# Patient Record
Sex: FEMALE | Race: WHITE | NOT HISPANIC OR LATINO | Employment: UNEMPLOYED | URBAN - METROPOLITAN AREA
[De-identification: names, ages, dates, MRNs, and addresses within clinical notes are randomized per-mention and may not be internally consistent; named-entity substitution may affect disease eponyms.]

---

## 2017-04-21 ENCOUNTER — APPOINTMENT (OUTPATIENT)
Dept: GENERAL RADIOLOGY | Facility: HOSPITAL | Age: 57
End: 2017-04-21

## 2017-04-21 ENCOUNTER — LAB (OUTPATIENT)
Dept: LAB | Facility: HOSPITAL | Age: 57
End: 2017-04-21

## 2017-04-21 ENCOUNTER — OFFICE VISIT (OUTPATIENT)
Dept: ONCOLOGY | Facility: CLINIC | Age: 57
End: 2017-04-21

## 2017-04-21 VITALS
HEIGHT: 64 IN | BODY MASS INDEX: 21.72 KG/M2 | RESPIRATION RATE: 20 BRPM | TEMPERATURE: 98.1 F | WEIGHT: 127.2 LBS | OXYGEN SATURATION: 100 % | SYSTOLIC BLOOD PRESSURE: 102 MMHG | HEART RATE: 66 BPM | DIASTOLIC BLOOD PRESSURE: 64 MMHG

## 2017-04-21 DIAGNOSIS — C34.11 MALIGNANT NEOPLASM OF UPPER LOBE OF RIGHT LUNG (HCC): Primary | ICD-10-CM

## 2017-04-21 DIAGNOSIS — C34.11 MALIGNANT NEOPLASM OF UPPER LOBE OF RIGHT LUNG (HCC): ICD-10-CM

## 2017-04-21 LAB
BASOPHILS # BLD AUTO: 0.05 10*3/MM3 (ref 0–0.1)
BASOPHILS NFR BLD AUTO: 0.6 % (ref 0–1.1)
DEPRECATED RDW RBC AUTO: 38.4 FL (ref 37–49)
EOSINOPHIL # BLD AUTO: 0.42 10*3/MM3 (ref 0–0.36)
EOSINOPHIL NFR BLD AUTO: 4.8 % (ref 1–5)
ERYTHROCYTE [DISTWIDTH] IN BLOOD BY AUTOMATED COUNT: 12.8 % (ref 11.7–14.5)
HCT VFR BLD AUTO: 38.6 % (ref 34–45)
HGB BLD-MCNC: 13.3 G/DL (ref 11.5–14.9)
IMM GRANULOCYTES # BLD: 0.06 10*3/MM3 (ref 0–0.03)
IMM GRANULOCYTES NFR BLD: 0.7 % (ref 0–0.5)
LYMPHOCYTES # BLD AUTO: 2.64 10*3/MM3 (ref 1–3.5)
LYMPHOCYTES NFR BLD AUTO: 30 % (ref 20–49)
MCH RBC QN AUTO: 28.2 PG (ref 27–33)
MCHC RBC AUTO-ENTMCNC: 34.5 G/DL (ref 32–35)
MCV RBC AUTO: 81.8 FL (ref 83–97)
MONOCYTES # BLD AUTO: 0.56 10*3/MM3 (ref 0.25–0.8)
MONOCYTES NFR BLD AUTO: 6.4 % (ref 4–12)
NEUTROPHILS # BLD AUTO: 5.07 10*3/MM3 (ref 1.5–7)
NEUTROPHILS NFR BLD AUTO: 57.5 % (ref 39–75)
NRBC BLD MANUAL-RTO: 0 /100 WBC (ref 0–0)
PLATELET # BLD AUTO: 283 10*3/MM3 (ref 150–375)
PMV BLD AUTO: 8.8 FL (ref 8.9–12.1)
RBC # BLD AUTO: 4.72 10*6/MM3 (ref 3.9–5)
WBC NRBC COR # BLD: 8.8 10*3/MM3 (ref 4–10)

## 2017-04-21 PROCEDURE — 36415 COLL VENOUS BLD VENIPUNCTURE: CPT

## 2017-04-21 PROCEDURE — 99213 OFFICE O/P EST LOW 20 MIN: CPT | Performed by: INTERNAL MEDICINE

## 2017-04-21 PROCEDURE — 85025 COMPLETE CBC W/AUTO DIFF WBC: CPT

## 2017-04-21 PROCEDURE — 71020 HC CHEST PA AND LATERAL: CPT

## 2017-04-21 RX ORDER — NICOTINE 21 MG/24HR
1 PATCH, TRANSDERMAL 24 HOURS TRANSDERMAL EVERY 24 HOURS
Qty: 30 PATCH | Refills: 2 | Status: SHIPPED | OUTPATIENT
Start: 2017-04-21 | End: 2021-03-05

## 2017-04-27 ENCOUNTER — HOSPITAL ENCOUNTER (EMERGENCY)
Facility: HOSPITAL | Age: 57
Discharge: HOME | End: 2017-04-27
Attending: EMERGENCY MEDICINE | Admitting: EMERGENCY MEDICINE

## 2017-04-27 ENCOUNTER — APPOINTMENT (OUTPATIENT)
Dept: CT IMAGING | Facility: HOSPITAL | Age: 57
End: 2017-04-27

## 2017-04-27 VITALS
RESPIRATION RATE: 16 BRPM | BODY MASS INDEX: 23.56 KG/M2 | DIASTOLIC BLOOD PRESSURE: 73 MMHG | WEIGHT: 120 LBS | SYSTOLIC BLOOD PRESSURE: 116 MMHG | TEMPERATURE: 98.3 F | HEIGHT: 60 IN | HEART RATE: 79 BPM | OXYGEN SATURATION: 100 %

## 2017-04-27 DIAGNOSIS — R42 VERTIGO: Primary | ICD-10-CM

## 2017-04-27 LAB
ALBUMIN SERPL-MCNC: 4.2 G/DL (ref 3.5–5.2)
ALBUMIN/GLOB SERPL: 1 G/DL
ALP SERPL-CCNC: 85 U/L (ref 39–117)
ALT SERPL W P-5'-P-CCNC: 14 U/L (ref 1–33)
ANION GAP SERPL CALCULATED.3IONS-SCNC: 12.7 MMOL/L
AST SERPL-CCNC: 22 U/L (ref 1–32)
BASOPHILS # BLD AUTO: 0.02 10*3/MM3 (ref 0–0.2)
BASOPHILS NFR BLD AUTO: 0.2 % (ref 0–1.5)
BILIRUB SERPL-MCNC: 0.4 MG/DL (ref 0.1–1.2)
BUN BLD-MCNC: 15 MG/DL (ref 6–20)
BUN/CREAT SERPL: 17.2 (ref 7–25)
CALCIUM SPEC-SCNC: 9.9 MG/DL (ref 8.6–10.5)
CHLORIDE SERPL-SCNC: 104 MMOL/L (ref 98–107)
CO2 SERPL-SCNC: 25.3 MMOL/L (ref 22–29)
CREAT BLD-MCNC: 0.87 MG/DL (ref 0.57–1)
DEPRECATED RDW RBC AUTO: 40.2 FL (ref 37–54)
EOSINOPHIL # BLD AUTO: 0.04 10*3/MM3 (ref 0–0.7)
EOSINOPHIL NFR BLD AUTO: 0.4 % (ref 0.3–6.2)
ERYTHROCYTE [DISTWIDTH] IN BLOOD BY AUTOMATED COUNT: 13.2 % (ref 11.7–13)
GFR SERPL CREATININE-BSD FRML MDRD: 82 ML/MIN/1.73
GLOBULIN UR ELPH-MCNC: 4.1 GM/DL
GLUCOSE BLD-MCNC: 119 MG/DL (ref 65–99)
HCT VFR BLD AUTO: 39.4 % (ref 35.6–45.5)
HGB BLD-MCNC: 13.5 G/DL (ref 11.9–15.5)
HOLD SPECIMEN: NORMAL
HOLD SPECIMEN: NORMAL
IMM GRANULOCYTES # BLD: 0 10*3/MM3 (ref 0–0.03)
IMM GRANULOCYTES NFR BLD: 0 % (ref 0–0.5)
LYMPHOCYTES # BLD AUTO: 1.67 10*3/MM3 (ref 0.9–4.8)
LYMPHOCYTES NFR BLD AUTO: 16.7 % (ref 19.6–45.3)
MAGNESIUM SERPL-MCNC: 2 MG/DL (ref 1.6–2.6)
MCH RBC QN AUTO: 28.5 PG (ref 26.9–32)
MCHC RBC AUTO-ENTMCNC: 34.3 G/DL (ref 32.4–36.3)
MCV RBC AUTO: 83.1 FL (ref 80.5–98.2)
MONOCYTES # BLD AUTO: 0.25 10*3/MM3 (ref 0.2–1.2)
MONOCYTES NFR BLD AUTO: 2.5 % (ref 5–12)
NEUTROPHILS # BLD AUTO: 8.05 10*3/MM3 (ref 1.9–8.1)
NEUTROPHILS NFR BLD AUTO: 80.2 % (ref 42.7–76)
PLATELET # BLD AUTO: 230 10*3/MM3 (ref 140–500)
PMV BLD AUTO: 9.1 FL (ref 6–12)
POTASSIUM BLD-SCNC: 3.8 MMOL/L (ref 3.5–5.2)
PROT SERPL-MCNC: 8.3 G/DL (ref 6–8.5)
RBC # BLD AUTO: 4.74 10*6/MM3 (ref 3.9–5.2)
SODIUM BLD-SCNC: 142 MMOL/L (ref 136–145)
TROPONIN T SERPL-MCNC: <0.01 NG/ML (ref 0–0.03)
WBC NRBC COR # BLD: 10.03 10*3/MM3 (ref 4.5–10.7)
WHOLE BLOOD HOLD SPECIMEN: NORMAL
WHOLE BLOOD HOLD SPECIMEN: NORMAL

## 2017-04-27 PROCEDURE — 83735 ASSAY OF MAGNESIUM: CPT | Performed by: EMERGENCY MEDICINE

## 2017-04-27 PROCEDURE — 93010 ELECTROCARDIOGRAM REPORT: CPT | Performed by: INTERNAL MEDICINE

## 2017-04-27 PROCEDURE — 93005 ELECTROCARDIOGRAM TRACING: CPT | Performed by: EMERGENCY MEDICINE

## 2017-04-27 PROCEDURE — 99284 EMERGENCY DEPT VISIT MOD MDM: CPT

## 2017-04-27 PROCEDURE — 80053 COMPREHEN METABOLIC PANEL: CPT | Performed by: EMERGENCY MEDICINE

## 2017-04-27 PROCEDURE — 85025 COMPLETE CBC W/AUTO DIFF WBC: CPT | Performed by: EMERGENCY MEDICINE

## 2017-04-27 PROCEDURE — 70450 CT HEAD/BRAIN W/O DYE: CPT

## 2017-04-27 PROCEDURE — 84484 ASSAY OF TROPONIN QUANT: CPT | Performed by: EMERGENCY MEDICINE

## 2017-04-27 RX ORDER — SODIUM CHLORIDE 0.9 % (FLUSH) 0.9 %
10 SYRINGE (ML) INJECTION AS NEEDED
Status: DISCONTINUED | OUTPATIENT
Start: 2017-04-27 | End: 2017-04-27 | Stop reason: HOSPADM

## 2017-05-01 ENCOUNTER — TELEPHONE (OUTPATIENT)
Dept: SOCIAL WORK | Facility: HOSPITAL | Age: 57
End: 2017-05-01

## 2017-07-26 ENCOUNTER — OFFICE VISIT (OUTPATIENT)
Dept: ORTHOPEDIC SURGERY | Facility: CLINIC | Age: 57
End: 2017-07-26

## 2017-07-26 VITALS — BODY MASS INDEX: 28.93 KG/M2 | WEIGHT: 125 LBS | HEIGHT: 55 IN

## 2017-07-26 DIAGNOSIS — M54.2 SPINE PAIN, CERVICAL: ICD-10-CM

## 2017-07-26 DIAGNOSIS — M54.50 SPINE PAIN, LUMBAR: Primary | ICD-10-CM

## 2017-07-26 PROCEDURE — 99214 OFFICE O/P EST MOD 30 MIN: CPT | Performed by: ORTHOPAEDIC SURGERY

## 2017-07-26 PROCEDURE — 72040 X-RAY EXAM NECK SPINE 2-3 VW: CPT | Performed by: ORTHOPAEDIC SURGERY

## 2017-07-26 PROCEDURE — 72100 X-RAY EXAM L-S SPINE 2/3 VWS: CPT | Performed by: ORTHOPAEDIC SURGERY

## 2017-07-26 NOTE — PROGRESS NOTES
She complains of continued neck and low back pain.  Neck pain radiates into the right shoulder and arm low back pain into the legs bilaterally.  Neurologically intact today's examination but the pain is failed to improve with conservative measures.  Previous myelogram and CT scan of the cervical spine was obtained and show degenerative changes and spinal stenosis.  For some reason the MRI is being requested at this point.  Plain film lumbar x-rays show disc degeneration at L4-5 and L5-S1 and slight loss of lordosis.  No comparison views are available.  Plain film cervical x-rays demonstrate multilevel spondylosis over 4 levels with loss of lordosis.  No comparison views are available.  I plan MRI scan of the cervical and lumbar spine perhaps with night toward epidural steroid injections.

## 2017-07-31 DIAGNOSIS — M54.2 CERVICAL SPINE PAIN: ICD-10-CM

## 2017-07-31 DIAGNOSIS — M54.50 LUMBAR SPINE PAIN: Primary | ICD-10-CM

## 2017-08-10 ENCOUNTER — APPOINTMENT (OUTPATIENT)
Dept: MRI IMAGING | Facility: HOSPITAL | Age: 57
End: 2017-08-10
Attending: ORTHOPAEDIC SURGERY

## 2017-08-10 ENCOUNTER — TELEPHONE (OUTPATIENT)
Dept: ORTHOPEDIC SURGERY | Facility: CLINIC | Age: 57
End: 2017-08-10

## 2017-08-10 DIAGNOSIS — M54.50 LUMBAR SPINE PAIN: Primary | ICD-10-CM

## 2017-08-10 DIAGNOSIS — M54.2 CERVICAL SPINE PAIN: ICD-10-CM

## 2017-08-10 NOTE — TELEPHONE ENCOUNTER
----- Message from Harpal Mobley MD sent at 8/10/2017  9:52 AM EDT -----  Regarding: FW: MRI [facility]  Tell pt, and send her to PT  ----- Message -----     From: Elena Prasad     Sent: 8/9/2017   3:40 PM       To: Harpal Mobley MD  Subject: [facility] sent me a note stating [employer] denied cervical and lumbar MRI's due to no PT, and lack of positive physical findings on exam.        SPOKE WITH PT IN DETAIL ABOUT MESSAGE AND PUT IN THE ORDER FOR PT MG

## 2017-08-21 ENCOUNTER — APPOINTMENT (OUTPATIENT)
Dept: PHYSICAL THERAPY | Facility: HOSPITAL | Age: 57
End: 2017-08-21
Attending: ORTHOPAEDIC SURGERY

## 2017-08-28 ENCOUNTER — HOSPITAL ENCOUNTER (OUTPATIENT)
Dept: PHYSICAL THERAPY | Facility: HOSPITAL | Age: 57
Setting detail: THERAPIES SERIES
Discharge: HOME | End: 2017-08-28
Attending: ORTHOPAEDIC SURGERY

## 2017-08-28 DIAGNOSIS — M54.50 LUMBAR SPINE PAIN: Primary | ICD-10-CM

## 2017-08-28 PROCEDURE — G8985 CARRY GOAL STATUS: HCPCS | Performed by: PHYSICAL THERAPIST

## 2017-08-28 PROCEDURE — G8984 CARRY CURRENT STATUS: HCPCS | Performed by: PHYSICAL THERAPIST

## 2017-08-28 PROCEDURE — 97161 PT EVAL LOW COMPLEX 20 MIN: CPT | Performed by: PHYSICAL THERAPIST

## 2017-08-28 NOTE — THERAPY EVALUATION
Outpatient Physical Therapy Ortho Initial Evaluation  [facility]     Patient Name: Yolanda J Hatchett  : 1960  MRN: [id number]  Today's Date: 2017      Visit Date: 2017    Patient Active Problem List   Diagnosis   • Lung cancer, upper lobe        Past Medical History:   Diagnosis Date   • Asthma    • Bleeding of blood vessel 2011    BRAIN   • Carpal tunnel syndrome on right    • COPD (chronic obstructive pulmonary disease)     MODERATE   • DDD (degenerative disc disease), cervical 2014    MODERATE C6-C7, SEEING DR. EWING   • Fibroids     UTERINE X 2   • Hypertension    • MI (myocardial infarction) 2009   • Non-small cell carcinoma of lung     Stabe IIA   • Ovarian cyst     RIGHT   • Pain of right upper extremity 2015   • Pelvic pain    • Right shoulder pain    • SOB (shortness of breath)    • Thyroid nodule    • Vertigo 2015   • Weight loss         Past Surgical History:   Procedure Laterality Date   • LUNG LOBECTOMY Right 2012    upper lobectomy and lymphadenectomy       Visit Dx:     ICD-10-CM ICD-9-CM   1. Lumbar spine pain M54.5 724.2             Patient History       17 1300          History    Chief Complaint Difficulty Walking;Difficulty with daily activities;Joint stiffness;Joint swelling;Muscle tenderness;Muscle weakness;Pain  -LC      Type of Pain Neck pain;Back pain   LUMBAR, Right neck and shoulder  -      Date Current Problem(s) Began 14  -      Brief Description of Current Complaint Pt reports insidious onset of pain in neck and low back 3 years ago. She reports that the pain in her neck radiates down into her R shoulder and up into her head giving her severe headaches. She reports that her lumbar pain radiates into her BLE and causes increased pain. She has tried rehab before, but did not have lasting results. She has not had any epidural injections into her back due to insurance issues per pt.  -      Previous treatment for THIS  PROBLEM Injections;Rehabilitation  -LC      Patient/Caregiver Goals Improve mobility;Improve strength;Know what to do to help the symptoms;Return to prior level of function;Relieve pain;Decrease swelling  -LC      Hand Dominance right-handed  -LC      Occupation/sports/leisure activities   -      What clinical tests have you had for this problem? X-ray  -      Results of Clinical Tests loss of lordosis in lumbar spine  -LC      Pain     Pain Location Neck;Back  -LC      Pain at Present 5  -LC      Pain at Best 7  -LC      Pain at Worst 7  -LC      Pain Frequency Constant/continuous  -LC      Pain Description Aching;Sore;Sharp;Pins and needles  -LC      What Performance Factors Make the Current Problem(s) WORSE? prolonged standing at work, end of day ADL's  -      What Performance Factors Make the Current Problem(s) BETTER? pt reports hot shower decreases neck and low back pain  -      Is your sleep disturbed? Yes  -LC      Difficulties at work? lifting, pushing, pulling, prolonged standing  -      Difficulties with ADL's? household ADL's.  -      Fall Risk Assessment    Any falls in the past year: No  -LC      Daily Activities    Primary Language English  -      Pt Participated in POC and Goals Yes  -LC      Safety    Are you being hurt, hit, or frightened by anyone at home or in your life? No  -LC      Are you being neglected by a caregiver No  -        User Key  (r) = Recorded By, (t) = Taken By, (c) = Cosigned By    Initials Name Provider Type    LACEY Hudson PT DPT Physical Therapist                PT Ortho       08/28/17 1400    Cervical/Thoracic Special Tests    Spurlings (Foraminal Compression) Negative  -LC    Cervical Compression (Forarminal Compression vs. Facet Pain) Negative  -LC    Cervical Distraction (Foraminal Compression vs. Facet Pain) Negative  -LC    Mily's Test (TOS) Negative  -LC    Lumbar/SI Special Tests    Trendelenburg Test (Gluteus Medius Weakness)  Bilateral:;Negative  -LC    Slump Test (Neural Tension) Negative  -LC    SLR (Neural Tension) Negative  -LC    SI Compression Test (SI Dysfunction) Negative  -LC    SI Distraction Test (SI Dysfunction) Negative  -LC    FAIR Test (Piriformis Syndrome) Negative  -LC    Chele's Signs    Superficial and non-anatomical tenderness Negative  -LC    Simulation test Negative  -LC    Distraction straight leg raise test (sitting vs supine) Negative  -LC    Regional disturbances Negative  -LC    Overreaction to examination Negative  -LC    Left Hip    Hip Flexion Gross Movement (4/5) good  -LC    Hip ABduction Gross Movement (4/5) good  -LC    Hip ADduction Gross Movement (4/5) good  -LC    Right Hip    Hip Flexion Gross Movement (4/5) good  -LC    Hip ABduction Gross Movement (4/5) good  -LC    Hip ADduction Gross Movement (4/5) good  -LC    Left Knee    Knee Extension Gross Movement (4/5) good  -LC    Knee Flexion Gross Movement (4/5) good  -LC    Right Knee    Knee Extension Gross Movement (4/5) good  -LC    Knee Flexion Gross Movement (4/5) good  -LC      User Key  (r) = Recorded By, (t) = Taken By, (c) = Cosigned By    Initials Name Provider Type    LACEY Hudson, PT DPT Physical Therapist                            Therapy Education       08/28/17 1431          Therapy Education    Given HEP;Symptoms/condition management;Pain management  -LC      Program New  -      How Provided Verbal;Demonstration;Written  -LC      Provided to Patient  -LC      Level of Understanding Teach back education performed;Verbalized;Demonstrated  -LC        User Key  (r) = Recorded By, (t) = Taken By, (c) = Cosigned By    Initials Name Provider Type    LACEY Hudson, PT DPT Physical Therapist                PT OP Goals       08/28/17 1500 08/28/17 1400    PT Short Term Goals    STG 1 Pt will be independent with HEP for improving lumbar spine mobility and cervical spine mobility to help relieve pain.  -LC     STG 1 Progress New  -      STG 2 Pt will report 15% improvement on NDI to indicate improved functional ability.  -LC     STG 2 Progress New  -LC     STG 3 Pt will report pain no greater than 3/10 with ADL's and work.  -LC     STG 3 Progress New  -LC     Time Calculation    PT Goal Re-Cert Due Date 09/28/17  -LC 09/28/17  -      User Key  (r) = Recorded By, (t) = Taken By, (c) = Cosigned By    Initials Name Provider Type     Rayray Hudson, PT DPT Physical Therapist                PT Assessment/Plan       08/28/17 1719       PT Assessment    Functional Limitations Limitations in functional capacity and performance;Performance in work activities  -     Impairments Joint mobility;Joint integrity;Endurance;Impaired flexibility;Muscle strength;Pain  -     Assessment Comments Pt is a 57 y.o. female who presents to PT today with chronic hx of cervical and lumbar pain. She reports insidious onset 3 years ago. She has never had epidural to relieve pain.  She reports R sided neck pain into R shoulder. She has full cervical AROM. R upper trap is tender to palpation and pt had relief of pain following stretching and education on self stretch. She was issued cervical HEP for shoulder retraction and depression and upper trap stretching. XR indicated decreased lordosis of lumbar spine. Pt did not appear to have any piriformis symptoms. She was educated on child's pose and cat/cow in quadruped for improving lumbar flexibility and encouraging open foramen. She was issued written copy of HEP for lumbar spine mobility. Pt reported pain 1/10 following HEP education.  -     Please refer to paper survey for additional self-reported information Yes  -LC     Rehab Potential Good  -LC     Patient/caregiver participated in establishment of treatment plan and goals Yes  -LC     Patient would benefit from skilled therapy intervention Yes  -LC     PT Plan    PT Frequency 2x/week  -     Predicted Duration of Therapy Intervention (days/wks) 3 weeks  -LC      Planned CPT's? PT EVAL LOW COMPLEXITY: 71942;PT RE-EVAL: 77244;PT NEUROMUSC RE-EDUCATION EA 15 MIN: 57365;PT MANUAL THERAPY EA 15 MIN: 11407;PT THER ACT EA 15 MIN: 54660;PT THER PROC EA 15 MIN: 31689  -     Physical Therapy Interventions (Optional Details) ROM (Range of Motion);strengthening;stretching;manual therapy techniques;lumbar stabilization;home exercise program;patient/family education  -     PT Plan Comments Pt requires skilled intervention to improve lumbar and cervical flexiblity and stability and to reduce pain with ADL's and work activities through stretches and therex as part of a HEP.  -       User Key  (r) = Recorded By, (t) = Taken By, (c) = Cosigned By    Initials Name Provider Type    LACEY Hudson, PT DPT Physical Therapist                  Exercises       08/28/17 1400          Exercise 1    Exercise Name 1 --  -        User Key  (r) = Recorded By, (t) = Taken By, (c) = Cosigned By    Initials Name Provider Type    LACEY Hudson, PT DPT Physical Therapist                              Time Calculation:   Start Time: 1345  Stop Time: 1425  Time Calculation (min): 40 min  Total Timed Code Minutes- PT: 40 minute(s)     Therapy Charges for Today     Code Description Service Date Service Provider Modifiers Qty    94631573804  PT EVAL LOW COMPLEXITY 3 8/28/2017 Rayray Hudson, PT DPT GP 1                    Rayray Hudson, PT DPT  8/28/2017

## 2017-08-28 NOTE — THERAPY EVALUATION
Outpatient Physical Therapy Ortho Initial Evaluation  [facility]     Patient Name: Yolanda J Hatchett  : 1960  MRN: [id number]  Today's Date: 2017      Visit Date: 2017    Patient Active Problem List   Diagnosis   • Lung cancer, upper lobe        Past Medical History:   Diagnosis Date   • Asthma    • Bleeding of blood vessel 2011    BRAIN   • Carpal tunnel syndrome on right    • COPD (chronic obstructive pulmonary disease)     MODERATE   • DDD (degenerative disc disease), cervical 2014    MODERATE C6-C7, SEEING DR. EWING   • Fibroids     UTERINE X 2   • Hypertension    • MI (myocardial infarction) 2009   • Non-small cell carcinoma of lung     Stabe IIA   • Ovarian cyst     RIGHT   • Pain of right upper extremity 2015   • Pelvic pain    • Right shoulder pain    • SOB (shortness of breath)    • Thyroid nodule    • Vertigo 2015   • Weight loss         Past Surgical History:   Procedure Laterality Date   • LUNG LOBECTOMY Right 2012    upper lobectomy and lymphadenectomy       Visit Dx:     ICD-10-CM ICD-9-CM   1. Lumbar spine pain M54.5 724.2             Patient History       17 1300          History    Chief Complaint Difficulty Walking;Difficulty with daily activities;Joint stiffness;Joint swelling;Muscle tenderness;Muscle weakness;Pain  -LC      Type of Pain Neck pain;Back pain   LUMBAR, Right neck and shoulder  -      Date Current Problem(s) Began 14  -      Brief Description of Current Complaint Pt reports insidious onset of pain in neck and low back 3 years ago. She reports that the pain in her neck radiates down into her R shoulder and up into her head giving her severe headaches. She reports that her lumbar pain radiates into her BLE and causes increased pain. She has tried rehab before, but did not have lasting results. She has not had any epidural injections into her back due to insurance issues per pt.  -      Previous treatment for THIS  PROBLEM Injections;Rehabilitation  -LC      Patient/Caregiver Goals Improve mobility;Improve strength;Know what to do to help the symptoms;Return to prior level of function;Relieve pain;Decrease swelling  -LC      Hand Dominance right-handed  -LC      Occupation/sports/leisure activities   -      What clinical tests have you had for this problem? X-ray  -      Results of Clinical Tests loss of lordosis in lumbar spine  -LC      Pain     Pain Location Neck;Back  -LC      Pain at Present 5  -LC      Pain at Best 7  -LC      Pain at Worst 7  -LC      Pain Frequency Constant/continuous  -LC      Pain Description Aching;Sore;Sharp;Pins and needles  -LC      What Performance Factors Make the Current Problem(s) WORSE? prolonged standing at work, end of day ADL's  -      What Performance Factors Make the Current Problem(s) BETTER? pt reports hot shower decreases neck and low back pain  -      Is your sleep disturbed? Yes  -LC      Difficulties at work? lifting, pushing, pulling, prolonged standing  -      Difficulties with ADL's? household ADL's.  -      Fall Risk Assessment    Any falls in the past year: No  -LC      Daily Activities    Primary Language English  -      Pt Participated in POC and Goals Yes  -LC      Safety    Are you being hurt, hit, or frightened by anyone at home or in your life? No  -LC      Are you being neglected by a caregiver No  -        User Key  (r) = Recorded By, (t) = Taken By, (c) = Cosigned By    Initials Name Provider Type    LACEY Hudson PT DPT Physical Therapist                PT Ortho       08/28/17 1400    Cervical/Thoracic Special Tests    Spurlings (Foraminal Compression) Negative  -LC    Cervical Compression (Forarminal Compression vs. Facet Pain) Negative  -LC    Cervical Distraction (Foraminal Compression vs. Facet Pain) Negative  -LC    Mily's Test (TOS) Negative  -LC    Lumbar/SI Special Tests    Trendelenburg Test (Gluteus Medius Weakness)  Bilateral:;Negative  -LC    Slump Test (Neural Tension) Negative  -LC    SLR (Neural Tension) Negative  -LC    SI Compression Test (SI Dysfunction) Negative  -LC    SI Distraction Test (SI Dysfunction) Negative  -LC    FAIR Test (Piriformis Syndrome) Negative  -LC    Chele's Signs    Superficial and non-anatomical tenderness Negative  -LC    Simulation test Negative  -LC    Distraction straight leg raise test (sitting vs supine) Negative  -LC    Regional disturbances Negative  -LC    Overreaction to examination Negative  -LC    Left Hip    Hip Flexion Gross Movement (4/5) good  -LC    Hip ABduction Gross Movement (4/5) good  -LC    Hip ADduction Gross Movement (4/5) good  -LC    Right Hip    Hip Flexion Gross Movement (4/5) good  -LC    Hip ABduction Gross Movement (4/5) good  -LC    Hip ADduction Gross Movement (4/5) good  -LC    Left Knee    Knee Extension Gross Movement (4/5) good  -LC    Knee Flexion Gross Movement (4/5) good  -LC    Right Knee    Knee Extension Gross Movement (4/5) good  -LC    Knee Flexion Gross Movement (4/5) good  -LC      User Key  (r) = Recorded By, (t) = Taken By, (c) = Cosigned By    Initials Name Provider Type    LACEY Hudson, PT DPT Physical Therapist                            Therapy Education       08/28/17 1431          Therapy Education    Given HEP;Symptoms/condition management;Pain management  -LC      Program New  -      How Provided Verbal;Demonstration;Written  -LC      Provided to Patient  -LC      Level of Understanding Teach back education performed;Verbalized;Demonstrated  -LC        User Key  (r) = Recorded By, (t) = Taken By, (c) = Cosigned By    Initials Name Provider Type    LACEY Hudson, PT DPT Physical Therapist                PT OP Goals       08/28/17 1500 08/28/17 1400    PT Short Term Goals    STG 1 Pt will be independent with HEP for improving lumbar spine mobility and cervical spine mobility to help relieve pain.  -LC     STG 1 Progress New  -      STG 2 Pt will report 15% improvement on NDI to indicate improved functional ability.  -LC     STG 2 Progress New  -LC     STG 3 Pt will report pain no greater than 3/10 with ADL's and work.  -LC     STG 3 Progress New  -LC     Time Calculation    PT Goal Re-Cert Due Date 09/28/17  -LC 09/28/17  -      User Key  (r) = Recorded By, (t) = Taken By, (c) = Cosigned By    Initials Name Provider Type     Rayray Hudson, PT DPT Physical Therapist                PT Assessment/Plan       08/28/17 8244       PT Assessment    Functional Limitations Limitations in functional capacity and performance;Performance in work activities  -     Impairments Joint mobility;Joint integrity;Endurance;Impaired flexibility;Muscle strength;Pain  -     Assessment Comments Pt is a 57 y.o. female who presents to PT today with chronic hx of cervical and lumbar pain. She reports insidious onset 3 years ago. She has never had epidural to relieve pain.  She reports R sided neck pain into R shoulder. She has full cervical AROM. R upper trap is tender to palpation and pt had relief of pain following stretching and education on self stretch. She was issued cervical HEP for shoulder retraction and depression and upper trap stretching. XR indicated decreased lordosis of lumbar spine. Pt did not appear to have any piriformis symptoms. She was educated on child's pose and cat/cow in quadruped for improving lumbar flexibility and encouraging open foramen. She was issued written copy of HEP for lumbar spine mobility. Pt reported pain 1/10 following HEP education.  -     Please refer to paper survey for additional self-reported information Yes  -LC     Rehab Potential Good  -LC     Patient/caregiver participated in establishment of treatment plan and goals Yes  -LC     Patient would benefit from skilled therapy intervention Yes  -LC     PT Plan    PT Frequency 2x/week  -     Predicted Duration of Therapy Intervention (days/wks) 3 weeks  -LC      Planned CPT's? PT EVAL LOW COMPLEXITY: 37449;PT RE-EVAL: 95380;PT NEUROMUSC RE-EDUCATION EA 15 MIN: 07411;PT MANUAL THERAPY EA 15 MIN: 23479;PT THER ACT EA 15 MIN: 76953;PT THER PROC EA 15 MIN: 94029  -     Physical Therapy Interventions (Optional Details) ROM (Range of Motion);strengthening;stretching;manual therapy techniques;lumbar stabilization;home exercise program;patient/family education  -     PT Plan Comments Pt requires skilled intervention to improve lumbar and cervical flexiblity and stability and to reduce pain with ADL's and work activities through stretches and therex as part of a HEP.  -       User Key  (r) = Recorded By, (t) = Taken By, (c) = Cosigned By    Initials Name Provider Type    LACEY Hudson, PT DPT Physical Therapist                  Exercises       08/28/17 1400          Exercise 1    Exercise Name 1 --  -        User Key  (r) = Recorded By, (t) = Taken By, (c) = Cosigned By    Initials Name Provider Type    LACEY Hudson, PT DPT Physical Therapist                              Outcome Measures       08/28/17 1500          Neck Disability Index    Section 1 - Pain Intensity 5  -LC      Section 2 - Personal Care 1  -LC      Section 3 - Lifting 4  -LC      Section 4 - Work 1  -LC      Section 5 - Headaches 1  -LC      Section 6 - Concentration 0  -LC      Section 7 - Sleeping 0  -LC      Section 8 - Driving 1  -LC      Section 9 - Reading 4  -LC      Section 10 - Recreation 3  -LC      Neck Disability Index Score 20  -      Functional Assessment    Outcome Measure Options Neck Disability Index (NDI)  -        User Key  (r) = Recorded By, (t) = Taken By, (c) = Cosigned By    Initials Name Provider Type    LACEY Hudson, PT DPT Physical Therapist            Time Calculation:   Start Time: 1345  Stop Time: 1425  Time Calculation (min): 40 min  Total Timed Code Minutes- PT: 40 minute(s)     Therapy Charges for Today     Code Description Service Date Service Provider  Modifiers Qty    80561589821 HC PT EVAL LOW COMPLEXITY 3 8/28/2017 Rayray Hudson, PT DPT GP 1    30514672699 HC PT CARRY MOV HAND OBJ CURRENT 8/28/2017 Rayray Hudson, PT DPT GP, CK 1    53183347302 HC PT CARRY MOV HAND OBJ PROJECTED 8/28/2017 Rayray Hudson PT DPT GP, CJ 1          PT G-Codes  PT Professional Judgement Used?: Yes  Outcome Measure Options: Neck Disability Index (NDI)  Score: 20/50  Functional Limitation: Carrying, moving and handling objects  Carrying, Moving and Handling Objects Current Status (): At least 40 percent but less than 60 percent impaired, limited or restricted  Carrying, Moving and Handling Objects Goal Status (): At least 20 percent but less than 40 percent impaired, limited or restricted         Rayray Hudson PT DPT  8/28/2017

## 2017-09-01 ENCOUNTER — APPOINTMENT (OUTPATIENT)
Dept: PHYSICAL THERAPY | Facility: HOSPITAL | Age: 57
End: 2017-09-01

## 2017-09-05 ENCOUNTER — APPOINTMENT (OUTPATIENT)
Dept: PHYSICAL THERAPY | Facility: HOSPITAL | Age: 57
End: 2017-09-05

## 2017-09-07 ENCOUNTER — HOSPITAL ENCOUNTER (OUTPATIENT)
Dept: PHYSICAL THERAPY | Facility: HOSPITAL | Age: 57
Setting detail: THERAPIES SERIES
Discharge: HOME | End: 2017-09-07

## 2017-09-07 DIAGNOSIS — M54.50 LUMBAR SPINE PAIN: Primary | ICD-10-CM

## 2017-09-07 PROCEDURE — 97140 MANUAL THERAPY 1/> REGIONS: CPT | Performed by: PHYSICAL THERAPIST

## 2017-09-07 PROCEDURE — 97035 APP MDLTY 1+ULTRASOUND EA 15: CPT | Performed by: PHYSICAL THERAPIST

## 2017-09-07 PROCEDURE — 97110 THERAPEUTIC EXERCISES: CPT | Performed by: PHYSICAL THERAPIST

## 2017-09-07 NOTE — THERAPY TREATMENT NOTE
Outpatient Physical Therapy Ortho Treatment Note  [facility]     Patient Name: Yolanda J Hatchett  : 1960  MRN: [id number]  Today's Date: 2017      Visit Date: 2017    Visit Dx:    ICD-10-CM ICD-9-CM   1. Lumbar spine pain M54.5 724.2       Patient Active Problem List   Diagnosis   • Lung cancer, upper lobe        Past Medical History:   Diagnosis Date   • Asthma    • Bleeding of blood vessel 2011    BRAIN   • Carpal tunnel syndrome on right    • COPD (chronic obstructive pulmonary disease)     MODERATE   • DDD (degenerative disc disease), cervical 2014    MODERATE C6-C7, SEEING DR. EWING   • Fibroids     UTERINE X 2   • Hypertension    • MI (myocardial infarction) 2009   • Non-small cell carcinoma of lung     Stabe IIA   • Ovarian cyst     RIGHT   • Pain of right upper extremity 2015   • Pelvic pain    • Right shoulder pain    • SOB (shortness of breath)    • Thyroid nodule    • Vertigo 2015   • Weight loss         Past Surgical History:   Procedure Laterality Date   • LUNG LOBECTOMY Right     upper lobectomy and lymphadenectomy             PT Ortho       17 1000    Subjective Comments    Subjective Comments Pt reports she has been performing HEP at home, but R upper trap is very painful. She reports pain with coughing and turning her head. She reports that HEP doesn't hurt.  -LC    Subjective Pain    Able to rate subjective pain? yes  -LC    Pre-Treatment Pain Level 7  -LC    Post-Treatment Pain Level 4  -      User Key  (r) = Recorded By, (t) = Taken By, (c) = Cosigned By    Initials Name Provider Type    LACEY Hudson, PT DPT Physical Therapist                            PT Assessment/Plan       17 1106       PT Assessment    Functional Limitations Limitations in functional capacity and performance;Performance in work activities  -     Impairments Joint mobility;Joint integrity;Endurance;Impaired flexibility;Muscle strength;Pain  -LC      Assessment Comments Pt reported no pain with therex today to encourage shoulder depression and retraction. She continues to report R upper trap as her main source of pain up to 8/10 with referred pain symptoms down her RUE. She had decreased pain following therex, RUE ultrasound, and R UE manual release. She indicated pain no more than 4/10 after interventions. Pt encourage to remain diligent with HEP and encouraged performance of rowing therex at home.  -LC     PT Plan    PT Plan Comments Pt requires skilled therapy to include therex, manual, strengthening, stretching, and ROM.  -       User Key  (r) = Recorded By, (t) = Taken By, (c) = Cosigned By    Initials Name Provider Type    LACEY Hudson, PT DPT Physical Therapist                Modalities       09/07/17 1000          Ultrasound 12137    Location R upper trap  -LC      Rx Minutes 8 min  -LC      Frequency 1.0 MHz  -LC      Intensity - Wts/cm 0.5  -        User Key  (r) = Recorded By, (t) = Taken By, (c) = Cosigned By    Initials Name Provider Type    LACEY Hudson, PT DPT Physical Therapist                Exercises       09/07/17 1000          Subjective Comments    Subjective Comments Pt reports she has been performing HEP at home, but R upper trap is very painful. She reports pain with coughing and turning her head. She reports that HEP doesn't hurt.  -      Subjective Pain    Able to rate subjective pain? yes  -      Pre-Treatment Pain Level 7  -      Post-Treatment Pain Level 4  -      Exercise 1    Exercise Name 1 see exercise flowsheet  -        User Key  (r) = Recorded By, (t) = Taken By, (c) = Cosigned By    Initials Name Provider Type    LACEY Hudson, PT DPT Physical Therapist                        Manual Rx (last 36 hours)      Manual Treatments       09/07/17 1100          Manual Rx 1    Manual Rx 1 Location R upper trap  -LC      Manual Rx 1 Type relase, therapeutic massage  -LC      Manual Rx 1 Duration 15 min   -LC        User Key  (r) = Recorded By, (t) = Taken By, (c) = Cosigned By    Initials Name Provider Type    LACEY Hudson, PT DPT Physical Therapist                PT OP Goals       09/07/17 1100       PT Short Term Goals    STG 1 Pt will be independent with HEP for improving lumbar spine mobility and cervical spine mobility to help relieve pain.  -LC     STG 1 Progress Progressing  -LC     STG 2 Pt will report 15% improvement on NDI to indicate improved functional ability.  -LC     STG 2 Progress Progressing  -LC     STG 3 Pt will report pain no greater than 3/10 with ADL's and work.  -LC     STG 3 Progress Progressing  -LC       User Key  (r) = Recorded By, (t) = Taken By, (c) = Cosigned By    Initials Name Provider Type    LACEY Hudson, PT DPT Physical Therapist                Therapy Education       09/07/17 1106          Therapy Education    Given HEP;Symptoms/condition management;Pain management  -LC      Program Reinforced  -LC      How Provided Verbal;Demonstration  -LC      Provided to Patient  -LC      Level of Understanding Teach back education performed;Verbalized;Demonstrated  -LC        User Key  (r) = Recorded By, (t) = Taken By, (c) = Cosigned By    Initials Name Provider Type    LACEY Hudson, PT DPT Physical Therapist                Time Calculation:   Start Time: 1015  Stop Time: 1055  Time Calculation (min): 40 min  Total Timed Code Minutes- PT: 40 minute(s)    Therapy Charges for Today     Code Description Service Date Service Provider Modifiers Qty    16396524196  PT THER PROC EA 15 MIN 9/7/2017 Rayray Hudson PT DPT GP 1    85367401460 HC PT MANUAL THERAPY EA 15 MIN 9/7/2017 Rayray Hudson PT DPT GP 1    25809358221 HC PT ULTRASOUND EA 15 MIN 9/7/2017 Rayray Hudson, PT DPT GP 1                    Rayray Hudson, PT DPT  9/7/2017

## 2017-09-11 ENCOUNTER — HOSPITAL ENCOUNTER (OUTPATIENT)
Dept: PHYSICAL THERAPY | Facility: HOSPITAL | Age: 57
Setting detail: THERAPIES SERIES
Discharge: HOME | End: 2017-09-11

## 2017-09-11 DIAGNOSIS — M54.50 LUMBAR SPINE PAIN: Primary | ICD-10-CM

## 2017-09-11 PROCEDURE — 97110 THERAPEUTIC EXERCISES: CPT | Performed by: PHYSICAL THERAPIST

## 2017-09-11 PROCEDURE — 97035 APP MDLTY 1+ULTRASOUND EA 15: CPT | Performed by: PHYSICAL THERAPIST

## 2017-09-11 NOTE — THERAPY TREATMENT NOTE
Outpatient Physical Therapy Ortho Treatment Note  [facility]     Patient Name: Yolanda J Hatchett  : 1960  MRN: [id number]  Today's Date: 2017      Visit Date: 2017    Visit Dx:    ICD-10-CM ICD-9-CM   1. Lumbar spine pain M54.5 724.2       Patient Active Problem List   Diagnosis   • Lung cancer, upper lobe        Past Medical History:   Diagnosis Date   • Asthma    • Bleeding of blood vessel 2011    BRAIN   • Carpal tunnel syndrome on right    • COPD (chronic obstructive pulmonary disease)     MODERATE   • DDD (degenerative disc disease), cervical 2014    MODERATE C6-C7, SEEING DR. EWING   • Fibroids     UTERINE X 2   • Hypertension    • MI (myocardial infarction) 2009   • Non-small cell carcinoma of lung     Stabe IIA   • Ovarian cyst     RIGHT   • Pain of right upper extremity 2015   • Pelvic pain    • Right shoulder pain    • SOB (shortness of breath)    • Thyroid nodule    • Vertigo 2015   • Weight loss         Past Surgical History:   Procedure Laterality Date   • LUNG LOBECTOMY Right 2012    upper lobectomy and lymphadenectomy             PT Ortho       17 1100    Subjective Comments    Subjective Comments Pt reports that she has no referred pain into her R shoulder at this time. She reports increased mobility of her neck, but continued muscle soreness in upper trap which is tender to touch.  -LC    Subjective Pain    Able to rate subjective pain? yes  -LC    Pre-Treatment Pain Level 2  -LC    Post-Treatment Pain Level 1  -      User Key  (r) = Recorded By, (t) = Taken By, (c) = Cosigned By    Initials Name Provider Type    LACEY Hudson, PT DPT Physical Therapist                            PT Assessment/Plan       17 1113       PT Assessment    Functional Limitations Limitations in functional capacity and performance;Performance in work activities  -     Impairments Joint mobility;Joint integrity;Endurance;Impaired flexibility;Muscle  strength;Pain  -LC     Assessment Comments Pt reported pain 1/10 today in neck. She had no referred pain down her R arm. She reported decreased pain with US and performed all therex today with no reported pain. She demonstrates cervical AROM WNL to both sides with only minimal tightness reported on R with L sidebending. Pt is compliant with HEP and demonstrates good correct performance of therex.  -LC     PT Plan    PT Plan Comments Pt requires continued skilled therapy to improve functional ability with work activities and decrease neck and low back pain to allow for no risk of injury or limitation.  -LC       User Key  (r) = Recorded By, (t) = Taken By, (c) = Cosigned By    Initials Name Provider Type    LACEY Hudson, PT DPT Physical Therapist                Modalities       09/11/17 1100          Ultrasound [id number]    Location R upper trap  -LC      Rx Minutes 8 min  -LC      Frequency 1.0 MHz  -LC      Intensity - Wts/cm 0.5  -LC        User Key  (r) = Recorded By, (t) = Taken By, (c) = Cosigned By    Initials Name Provider Type    LACEY Hudson, PT DPT Physical Therapist                Exercises       09/11/17 1100          Subjective Comments    Subjective Comments Pt reports that she has no referred pain into her R shoulder at this time. She reports increased mobility of her neck, but continued muscle soreness in upper trap which is tender to touch.  -LC      Subjective Pain    Able to rate subjective pain? yes  -LC      Pre-Treatment Pain Level 2  -LC      Post-Treatment Pain Level 1  -LC      Exercise 1    Exercise Name 1 High Row  -LC      Sets 1 3  -LC      Reps 1 8  -LC      Additional Comments standing, RTB  -LC      Exercise 2    Exercise Name 2 D1/D2 ext  -LC      Sets 2 3  -LC      Reps 2 8  -LC      Additional Comments standing RTB  -LC      Exercise 3    Exercise Name 3 ER  -LC      Sets 3 3  -LC      Reps 3 8  -LC      Additional Comments standing, RTB  -LC      Exercise 4    Exercise Name  4 bent row  -LC      Sets 4 3  -LC      Reps 4 8  -LC      Additional Comments SA, 6#s  -LC      Exercise 5    Exercise Name 5 scaption  -LC      Sets 5 3  -LC      Reps 5 8  -LC      Additional Comments standing, 4#s  -LC      Exercise 6    Exercise Name 6 pec stretch  -LC      Sets 6 1  -LC      Reps 6 10  -LC      Time (Seconds) 6 10  -LC      Additional Comments standing, corner  -LC        User Key  (r) = Recorded By, (t) = Taken By, (c) = Cosigned By    Initials Name Provider Type    LACEY Hudson, PT DPT Physical Therapist                                   Therapy Education       09/11/17 1113          Therapy Education    Given HEP;Symptoms/condition management;Pain management  -LC      Program Progressed  -LC      How Provided Verbal;Demonstration;Written  -LC      Provided to Patient  -LC      Level of Understanding Teach back education performed;Verbalized;Demonstrated  -LC        User Key  (r) = Recorded By, (t) = Taken By, (c) = Cosigned By    Initials Name Provider Type    LACEY Hudson PT DPT Physical Therapist                Time Calculation:   Start Time: 1030  Stop Time: 1115  Time Calculation (min): 45 min  Total Timed Code Minutes- PT: 45 minute(s)    Therapy Charges for Today     Code Description Service Date Service Provider Modifiers Qty    07338378127  PT THER PROC EA 15 MIN 9/11/2017 Rayray Hudson PT DPT GP 2    20912205089  PT ULTRASOUND EA 15 MIN 9/11/2017 Rayray Hudson PT DPT GP 1                    Rayray Hudson PT DPT  9/11/2017

## 2017-09-14 ENCOUNTER — HOSPITAL ENCOUNTER (OUTPATIENT)
Dept: PHYSICAL THERAPY | Facility: HOSPITAL | Age: 57
Setting detail: THERAPIES SERIES
Discharge: HOME | End: 2017-09-14

## 2017-09-14 DIAGNOSIS — M54.50 LUMBAR SPINE PAIN: Primary | ICD-10-CM

## 2017-09-14 PROCEDURE — 97110 THERAPEUTIC EXERCISES: CPT | Performed by: PHYSICAL THERAPIST

## 2017-09-14 PROCEDURE — 97035 APP MDLTY 1+ULTRASOUND EA 15: CPT | Performed by: PHYSICAL THERAPIST

## 2017-09-14 PROCEDURE — 97140 MANUAL THERAPY 1/> REGIONS: CPT | Performed by: PHYSICAL THERAPIST

## 2017-09-14 NOTE — THERAPY TREATMENT NOTE
Outpatient Physical Therapy Ortho Treatment Note  [facility]     Patient Name: Yolanda J Hatchett  : 1960  MRN: [id number]  Today's Date: 2017      Visit Date: 2017    Visit Dx:    ICD-10-CM ICD-9-CM   1. Lumbar spine pain M54.5 724.2       Patient Active Problem List   Diagnosis   • Lung cancer, upper lobe        Past Medical History:   Diagnosis Date   • Asthma    • Bleeding of blood vessel 2011    BRAIN   • Carpal tunnel syndrome on right    • COPD (chronic obstructive pulmonary disease)     MODERATE   • DDD (degenerative disc disease), cervical 2014    MODERATE C6-C7, SEEING DR. EWING   • Fibroids     UTERINE X 2   • Hypertension    • MI (myocardial infarction) 2009   • Non-small cell carcinoma of lung     Stabe IIA   • Ovarian cyst     RIGHT   • Pain of right upper extremity 2015   • Pelvic pain    • Right shoulder pain    • SOB (shortness of breath)    • Thyroid nodule    • Vertigo 2015   • Weight loss         Past Surgical History:   Procedure Laterality Date   • LUNG LOBECTOMY Right     upper lobectomy and lymphadenectomy             PT Ortho       17 1000    Subjective Comments    Subjective Comments Pt reports she had an episode of vertigo yesterday. She remains compliant with HEP and reports no lumbar pain. She says that her pain is only in her neck now with no referred pain.  -LC    Subjective Pain    Able to rate subjective pain? yes  -LC    Pre-Treatment Pain Level 6  -LC    Post-Treatment Pain Level 1  -      User Key  (r) = Recorded By, (t) = Taken By, (c) = Cosigned By    Initials Name Provider Type    LACEY Hudson, PT DPT Physical Therapist                            PT Assessment/Plan       17 1112       PT Assessment    Functional Limitations Limitations in functional capacity and performance;Performance in work activities  -     Impairments Joint mobility;Joint integrity;Endurance;Impaired flexibility;Muscle  strength;Pain  -LC     Assessment Comments Pt reported pain 6/10 in neck and 0/10 in lumbar spine to start treatment today. She reported HEP is going well at home and she has no pain while performing therex. She has no referred pain down arms at this time. She reports that her cervical pain seems to have centralized in her neck. She will continue to be progressed to improve neutral head and neck and strengthen lower strap and spine stabilizers.  -LC     PT Plan    PT Plan Comments Pt requires continued skilled intervention to include therex, manual, strengthening, stretching, and ROM.  -LC       User Key  (r) = Recorded By, (t) = Taken By, (c) = Cosigned By    Initials Name Provider Type    LACEY Hudson, PT DPT Physical Therapist                Modalities       09/14/17 1000          Ultrasound 90257    Location R upper trap  -LC      Rx Minutes 8 min  -LC      Frequency 1.0 MHz  -LC      Intensity - Wts/cm 0.5  -LC        User Key  (r) = Recorded By, (t) = Taken By, (c) = Cosigned By    Initials Name Provider Type    LACEY Hudson, PT DPT Physical Therapist                Exercises       09/14/17 1000          Subjective Comments    Subjective Comments Pt reports she had an episode of vertigo yesterday. She remains compliant with HEP and reports no lumbar pain. She says that her pain is only in her neck now with no referred pain.  -LC      Subjective Pain    Able to rate subjective pain? yes  -LC      Pre-Treatment Pain Level 6  -LC      Post-Treatment Pain Level 1  -LC      Exercise 1    Exercise Name 1 High Row  -LC      Sets 1 3  -LC      Reps 1 8  -LC      Additional Comments standing, RB  -LC      Exercise 2    Exercise Name 2 D1/D2 ext  -LC      Sets 2 3  -LC      Reps 2 8  -LC      Additional Comments standing RTB  -LC      Exercise 3    Exercise Name 3 ER  -LC      Sets 3 3  -LC      Reps 3 8  -LC      Additional Comments standing RTB  -LC      Exercise 7    Exercise Name 7 Y's  -LC      Sets 7 3   -LC      Reps 7 8  -LC      Additional Comments standing, RTB  -LC      Exercise 8    Exercise Name 8 UBE  -LC      Time (Minutes) 8 4  -LC      Additional Comments level 4  -LC        User Key  (r) = Recorded By, (t) = Taken By, (c) = Cosigned By    Initials Name Provider Type    LACEY Hudson, PT DPT Physical Therapist                        Manual Rx (last 36 hours)      Manual Treatments       09/14/17 1100          Manual Rx 1    Manual Rx 1 Location R upper trap  -LC      Manual Rx 1 Type relase, therapeutic massage  -LC      Manual Rx 1 Duration 10 min  -LC        User Key  (r) = Recorded By, (t) = Taken By, (c) = Cosigned By    Initials Name Provider Type    LACEY Hudson, PT DPT Physical Therapist                PT OP Goals       09/14/17 1100       PT Short Term Goals    STG 1 Pt will be independent with HEP for improving lumbar spine mobility and cervical spine mobility to help relieve pain.  -     STG 1 Progress Met  -LC     STG 2 Pt will report 15% improvement on NDI to indicate improved functional ability.  -     STG 2 Progress Progressing  -     STG 3 Pt will report pain no greater than 3/10 with ADL's and work.  -     STG 3 Progress Progressing  -       User Key  (r) = Recorded By, (t) = Taken By, (c) = Cosigned By    Initials Name Provider Type    LACEY Hudson, PT DPT Physical Therapist                Therapy Education       09/14/17 1110          Therapy Education    Given HEP;Symptoms/condition management;Pain management  -      Program Reinforced  -      How Provided Verbal;Demonstration  -LC      Provided to Patient  -      Level of Understanding Teach back education performed;Verbalized;Demonstrated  -        User Key  (r) = Recorded By, (t) = Taken By, (c) = Cosigned By    Initials Name Provider Type    LACEY Hudson, PT DPT Physical Therapist                Time Calculation:   Start Time: 1030  Stop Time: 1110  Time Calculation (min): 40 min  Total Timed  Code Minutes- PT: 40 minute(s)    Therapy Charges for Today     Code Description Service Date Service Provider Modifiers Qty    49605676254 HC PT THER PROC EA 15 MIN 9/14/2017 Rayray Hudson, PT DPT GP 1    38501896984 HC PT MANUAL THERAPY EA 15 MIN 9/14/2017 Rayray Hudson, PT DPT GP 1    97043229987 HC PT ULTRASOUND EA 15 MIN 9/14/2017 Rayray Hudson, PT DPT GP 1                    Rayray Hudson, PT DPT  9/14/2017

## 2017-09-18 ENCOUNTER — HOSPITAL ENCOUNTER (OUTPATIENT)
Dept: PHYSICAL THERAPY | Facility: HOSPITAL | Age: 57
Setting detail: THERAPIES SERIES
Discharge: HOME | End: 2017-09-18

## 2017-09-18 DIAGNOSIS — M54.50 LUMBAR SPINE PAIN: Primary | ICD-10-CM

## 2017-09-18 PROCEDURE — 97110 THERAPEUTIC EXERCISES: CPT | Performed by: PHYSICAL THERAPIST

## 2017-09-18 PROCEDURE — 97035 APP MDLTY 1+ULTRASOUND EA 15: CPT | Performed by: PHYSICAL THERAPIST

## 2017-09-18 PROCEDURE — 97140 MANUAL THERAPY 1/> REGIONS: CPT | Performed by: PHYSICAL THERAPIST

## 2017-09-18 NOTE — THERAPY TREATMENT NOTE
Outpatient Physical Therapy Ortho Treatment Note  [facility]     Patient Name: Yolanda J Hatchett  : 1960  MRN: [id number]  Today's Date: 2017      Visit Date: 2017    Visit Dx:    ICD-10-CM ICD-9-CM   1. Lumbar spine pain M54.5 724.2       Patient Active Problem List   Diagnosis   • Lung cancer, upper lobe        Past Medical History:   Diagnosis Date   • Asthma    • Bleeding of blood vessel 2011    BRAIN   • Carpal tunnel syndrome on right    • COPD (chronic obstructive pulmonary disease)     MODERATE   • DDD (degenerative disc disease), cervical 2014    MODERATE C6-C7, SEEING DR. EWING   • Fibroids     UTERINE X 2   • Hypertension    • MI (myocardial infarction) 2009   • Non-small cell carcinoma of lung     Stabe IIA   • Ovarian cyst     RIGHT   • Pain of right upper extremity 2015   • Pelvic pain    • Right shoulder pain    • SOB (shortness of breath)    • Thyroid nodule    • Vertigo 2015   • Weight loss         Past Surgical History:   Procedure Laterality Date   • LUNG LOBECTOMY Right 2012    upper lobectomy and lymphadenectomy             PT Ortho       17 1100    Subjective Comments    Subjective Comments Pt says she is pain free today. She reports that her low back and neck pain is intermittent now, but she feels that the exercises are helping her  -LC    Subjective Pain    Able to rate subjective pain? yes  -LC    Pre-Treatment Pain Level 0  -LC    Post-Treatment Pain Level 0  -LC      User Key  (r) = Recorded By, (t) = Taken By, (c) = Cosigned By    Initials Name Provider Type    LACEY Hudson, PT DPT Physical Therapist                            PT Assessment/Plan       17 1119       PT Assessment    Functional Limitations Limitations in functional capacity and performance;Performance in work activities  -     Impairments Joint mobility;Joint integrity;Endurance;Impaired flexibility;Muscle strength;Pain  -LC     Assessment Comments  Pt has pain 0/10 in neck and back today. She performed light weight overhead press with no complaints of pain today. Pt tolerated manual stretching and nerve glide and US and reported no referred symptoms down into RUE. She will cotninue to be progressed to D/c.  -LC     PT Plan    PT Plan Comments Pt requires continued skilled therapy to improve functional performance of ADL's and work activities with improved posture to reduce risk of reinjury of neck and back pain.  -LC       User Key  (r) = Recorded By, (t) = Taken By, (c) = Cosigned By    Initials Name Provider Type    LACEY Hudson, PT DPT Physical Therapist                Modalities       09/18/17 1100          Ultrasound [id number]    Location R upper trap  -LC      Rx Minutes 8 min  -LC      Frequency 1.0 MHz  -LC      Intensity - Wts/cm 0.5  -LC        User Key  (r) = Recorded By, (t) = Taken By, (c) = Cosigned By    Initials Name Provider Type    LACEY Hudson, PT DPT Physical Therapist                Exercises       09/18/17 1100          Subjective Comments    Subjective Comments Pt says she is pain free today. She reports that her low back and neck pain is intermittent now, but she feels that the exercises are helping her  -LC      Subjective Pain    Able to rate subjective pain? yes  -LC      Pre-Treatment Pain Level 0  -LC      Post-Treatment Pain Level 0  -LC      Exercise 1    Exercise Name 1 High Row  -LC      Sets 1 3  -LC      Reps 1 8  -LC      Additional Comments GTB  -LC      Exercise 2    Exercise Name 2 D1/D2 ext  -LC      Sets 2 3  -LC      Reps 2 8  -LC      Additional Comments GTB  -LC      Exercise 5    Exercise Name 5 scaption  -LC      Sets 5 3  -LC      Reps 5 8  -LC      Additional Comments 4#s  -LC      Exercise 8    Exercise Name 8 UBE  -LC      Time (Minutes) 8 4  -LC      Exercise 9    Exercise Name 9 overhead press  -LC      Sets 9 3  -LC      Reps 9 8  -LC      Additional Comments 3#s  -LC        User Key  (r) = Recorded  By, (t) = Taken By, (c) = Cosigned By    Initials Name Provider Type    LACEY Hudson, PT DPT Physical Therapist                        Manual Rx (last 36 hours)      Manual Treatments       09/18/17 1100          Manual Rx 1    Manual Rx 1 Location R shoulder  -LC      Manual Rx 1 Type upper trap release, nerve glide, anterior capsule stretch  -LC      Manual Rx 1 Duration 15 min  -LC        User Key  (r) = Recorded By, (t) = Taken By, (c) = Cosigned By    Initials Name Provider Type    LACEY Hudson, PT DPT Physical Therapist                PT OP Goals       09/18/17 1100       PT Short Term Goals    STG 1 Pt will be independent with HEP for improving lumbar spine mobility and cervical spine mobility to help relieve pain.  -LC     STG 1 Progress Met  -LC     STG 2 Pt will report 15% improvement on NDI to indicate improved functional ability.  -LC     STG 2 Progress Progressing  -LC     STG 3 Pt will report pain no greater than 3/10 with ADL's and work.  -LC     STG 3 Progress Progressing  -       User Key  (r) = Recorded By, (t) = Taken By, (c) = Cosigned By    Initials Name Provider Type    LACEY Hudson, PT DPT Physical Therapist                Therapy Education       09/18/17 1115          Therapy Education    Given HEP;Symptoms/condition management;Pain management  -LC      Program Reinforced  -LC      How Provided Verbal;Demonstration  -LC      Provided to Patient  -LC      Level of Understanding Teach back education performed;Verbalized;Demonstrated  -        User Key  (r) = Recorded By, (t) = Taken By, (c) = Cosigned By    Initials Name Provider Type    LACEY Hudson, PT DPT Physical Therapist                Time Calculation:   Start Time: 1026  Stop Time: 1115  Time Calculation (min): 49 min  Total Timed Code Minutes- PT: 49 minute(s)    Therapy Charges for Today     Code Description Service Date Service Provider Modifiers Qty    95603171385 HC PT THER PROC EA 15 MIN 9/18/2017 Rayray HUDDLESTON  Tristan, PT DPT GP 1    83558912765 HC PT MANUAL THERAPY EA 15 MIN 9/18/2017 Rayray Hudson, PT DPT GP 1    44931741303 HC PT ULTRASOUND EA 15 MIN 9/18/2017 Rayray Hudson, PT DPT GP 1                    Rayray Hudson, PT DPT  9/18/2017

## 2017-09-21 ENCOUNTER — HOSPITAL ENCOUNTER (OUTPATIENT)
Dept: PHYSICAL THERAPY | Facility: HOSPITAL | Age: 57
Setting detail: THERAPIES SERIES
Discharge: HOME | End: 2017-09-21

## 2017-09-21 DIAGNOSIS — M54.50 LUMBAR SPINE PAIN: Primary | ICD-10-CM

## 2017-09-21 PROCEDURE — 97035 APP MDLTY 1+ULTRASOUND EA 15: CPT | Performed by: PHYSICAL THERAPIST

## 2017-09-21 PROCEDURE — 97110 THERAPEUTIC EXERCISES: CPT | Performed by: PHYSICAL THERAPIST

## 2017-09-21 NOTE — THERAPY TREATMENT NOTE
Outpatient Physical Therapy Ortho Treatment Note  [facility]     Patient Name: Yolanda J Hatchett  : 1960  MRN: [id number]  Today's Date: 2017      Visit Date: 2017    Visit Dx:    ICD-10-CM ICD-9-CM   1. Lumbar spine pain M54.5 724.2       Patient Active Problem List   Diagnosis   • Lung cancer, upper lobe        Past Medical History:   Diagnosis Date   • Asthma    • Bleeding of blood vessel 2011    BRAIN   • Carpal tunnel syndrome on right    • COPD (chronic obstructive pulmonary disease)     MODERATE   • DDD (degenerative disc disease), cervical 2014    MODERATE C6-C7, SEEING DR. EWING   • Fibroids     UTERINE X 2   • Hypertension    • MI (myocardial infarction) 2009   • Non-small cell carcinoma of lung     Stabe IIA   • Ovarian cyst     RIGHT   • Pain of right upper extremity 2015   • Pelvic pain    • Right shoulder pain    • SOB (shortness of breath)    • Thyroid nodule    • Vertigo 2015   • Weight loss         Past Surgical History:   Procedure Laterality Date   • LUNG LOBECTOMY Right 2012    upper lobectomy and lymphadenectomy             PT Ortho       17 1100    Subjective Comments    Subjective Comments Pt has mild pain in neck with referred soreness and tingling into B UE  -LC    Subjective Pain    Able to rate subjective pain? yes  -LC    Pre-Treatment Pain Level 5  -LC    Post-Treatment Pain Level 4  -LC      User Key  (r) = Recorded By, (t) = Taken By, (c) = Cosigned By    Initials Name Provider Type    LACEY Hudson, PT DPT Physical Therapist                            PT Assessment/Plan       17 1122       PT Assessment    Functional Limitations Limitations in functional capacity and performance;Performance in work activities  -     Impairments Joint mobility;Joint integrity;Endurance;Impaired flexibility;Muscle strength;Pain  -LC     Assessment Comments Pt has referred symptoms in BUE today. She reports no low back pain. She  says low back has improved to a point where she has no problems or impairments with low back at this time. She again had relief with performance of UE nerve glides and therex to encourage shoulder depression and retraction. She will continue to be progressed to independence with HEP.  -LC     PT Plan    PT Plan Comments Pt requires continued skilled intervention to include manual, strengthening, stretching, and ROM  -LC       User Key  (r) = Recorded By, (t) = Taken By, (c) = Cosigned By    Initials Name Provider Type    LACEY Hudson, PT DPT Physical Therapist                Modalities       09/21/17 1100          Ultrasound [id number]    Location R upper trap  -LC      Rx Minutes 8 min  -LC      Frequency 1.0 MHz  -LC      Intensity - Wts/cm 0.5  -LC        User Key  (r) = Recorded By, (t) = Taken By, (c) = Cosigned By    Initials Name Provider Type    LACEY Hudson, PT DPT Physical Therapist                Exercises       09/21/17 1100          Subjective Comments    Subjective Comments Pt has mild pain in neck with referred soreness and tingling into B UE  -LC      Subjective Pain    Able to rate subjective pain? yes  -LC      Pre-Treatment Pain Level 5  -LC      Post-Treatment Pain Level 4  -LC      Exercise 1    Exercise Name 1 High Row  -LC      Sets 1 3  -LC      Reps 1 8  -LC      Exercise 2    Exercise Name 2 D1/D2 ext  -LC      Sets 2 3  -LC      Reps 2 8  -LC      Exercise 3    Exercise Name 3 ER  -LC      Sets 3 3  -LC      Reps 3 8  -LC      Exercise 4    Exercise Name 4 bent row  -LC      Sets 4 3  -LC      Reps 4 8  -LC      Exercise 5    Exercise Name 5 scaption  -LC      Sets 5 3  -LC      Reps 5 8  -LC      Exercise 6    Exercise Name 6 pec stretch  -LC      Sets 6 1  -LC      Reps 6 10  -LC      Time (Seconds) 6 10  -LC      Exercise 7    Exercise Name 7 Y's  -LC      Sets 7 3  -LC      Reps 7 8  -LC      Exercise 8    Exercise Name 8 UBE  -LC      Time (Minutes) 8 4  -LC      Exercise 9     Exercise Name 9 overhead press  -LC      Sets 9 3  -LC      Reps 9 8  -LC        User Key  (r) = Recorded By, (t) = Taken By, (c) = Cosigned By    Initials Name Provider Type    CAITLIN PalmaT Physical Therapist                                   Therapy Education       09/21/17 1122          Therapy Education    Given HEP;Symptoms/condition management;Pain management  -LC      Program Reinforced  -LC      How Provided Verbal;Demonstration  -LC      Provided to Patient  -LC      Level of Understanding Teach back education performed;Verbalized;Demonstrated  -LC        User Key  (r) = Recorded By, (t) = Taken By, (c) = Cosigned By    Initials Name Provider Type    LACEY Hudson PT DPT Physical Therapist                Time Calculation:   Start Time: 1030  Stop Time: 1115  Time Calculation (min): 45 min  Total Timed Code Minutes- PT: 45 minute(s)    Therapy Charges for Today     Code Description Service Date Service Provider Modifiers Qty    15195139097  PT THER PROC EA 15 MIN 9/21/2017 Rayray Hudson PT DPT GP 2    [id number]  PT ULTRASOUND EA 15 MIN 9/21/2017 Rayray Hudson PT DPT GP 1                    Rayray Hudson PT DPT  9/21/2017

## 2017-09-25 ENCOUNTER — HOSPITAL ENCOUNTER (OUTPATIENT)
Dept: PHYSICAL THERAPY | Facility: HOSPITAL | Age: 57
Setting detail: THERAPIES SERIES
Discharge: HOME | End: 2017-09-25

## 2017-09-25 DIAGNOSIS — M54.50 LUMBAR SPINE PAIN: Primary | ICD-10-CM

## 2017-09-25 PROCEDURE — 97110 THERAPEUTIC EXERCISES: CPT | Performed by: PHYSICAL THERAPIST

## 2017-09-25 PROCEDURE — 97035 APP MDLTY 1+ULTRASOUND EA 15: CPT | Performed by: PHYSICAL THERAPIST

## 2017-09-25 NOTE — THERAPY TREATMENT NOTE
Outpatient Physical Therapy Ortho Treatment Note  [facility]     Patient Name: Yolanda J Hatchett  : 1960  MRN: [id number]  Today's Date: 2017      Visit Date: 2017    Visit Dx:    ICD-10-CM ICD-9-CM   1. Lumbar spine pain M54.5 724.2       Patient Active Problem List   Diagnosis   • Lung cancer, upper lobe        Past Medical History:   Diagnosis Date   • Asthma    • Bleeding of blood vessel 2011    BRAIN   • Carpal tunnel syndrome on right    • COPD (chronic obstructive pulmonary disease)     MODERATE   • DDD (degenerative disc disease), cervical 2014    MODERATE C6-C7, SEEING DR. WEING   • Fibroids     UTERINE X 2   • Hypertension    • MI (myocardial infarction) 2009   • Non-small cell carcinoma of lung     Stabe IIA   • Ovarian cyst     RIGHT   • Pain of right upper extremity 2015   • Pelvic pain    • Right shoulder pain    • SOB (shortness of breath)    • Thyroid nodule    • Vertigo 2015   • Weight loss         Past Surgical History:   Procedure Laterality Date   • LUNG LOBECTOMY Right 2012    upper lobectomy and lymphadenectomy             PT Ortho       17 1100    Subjective Comments    Subjective Comments Pt reports that she has only minimal pain in her L neck. She says that she continues to have no low back pain and her exercises are continuing to help.  -LC    Subjective Pain    Able to rate subjective pain? yes  -LC    Pre-Treatment Pain Level 2  -LC    Post-Treatment Pain Level 2  -LC      User Key  (r) = Recorded By, (t) = Taken By, (c) = Cosigned By    Initials Name Provider Type    LACEY Hudson, PT DPT Physical Therapist                            PT Assessment/Plan       17 1200       PT Assessment    Functional Limitations Limitations in functional capacity and performance;Performance in work activities  -     Impairments Joint mobility;Joint integrity;Endurance;Impaired flexibility;Muscle strength;Pain  -     Assessment  Comments Pt reports pain in neck 2/10. She says that her low back pain continues to be improved. She demonstrates improved posture and is performing a wide variety of shoulder and upper back exercises to improve posture. She reports no symptoms of pain in her neck while performing these exercises.  -LC     PT Plan    PT Plan Comments Pt requires continued skilled therapy to improve her posture and promote shoulder retraction and depression to alleviate force on neck and allow for neutral position.  -LC       User Key  (r) = Recorded By, (t) = Taken By, (c) = Cosigned By    Initials Name Provider Type    LACEY Hudson, PT DPT Physical Therapist                Modalities       09/25/17 1100          Ultrasound [id number]    Location R upper trap  -LC      Rx Minutes 8 min  -LC      Frequency 1.0 MHz  -LC      Intensity - Wts/cm 0.5  -LC        User Key  (r) = Recorded By, (t) = Taken By, (c) = Cosigned By    Initials Name Provider Type    LACEY Hudson, PT DPT Physical Therapist                Exercises       09/25/17 1100          Subjective Comments    Subjective Comments Pt reports that she has only minimal pain in her L neck. She says that she continues to have no low back pain and her exercises are continuing to help.  -LC      Subjective Pain    Able to rate subjective pain? yes  -LC      Pre-Treatment Pain Level 2  -LC      Post-Treatment Pain Level 2  -LC      Exercise 1    Exercise Name 1 High Row  -LC      Sets 1 3  -LC      Reps 1 8  -LC      Exercise 2    Exercise Name 2 D1/D2 ext  -LC      Sets 2 3  -LC      Reps 2 8  -LC      Exercise 3    Exercise Name 3 ER  -LC      Sets 3 3  -LC      Reps 3 8  -LC      Exercise 4    Exercise Name 4 bent row  -LC      Sets 4 3  -LC      Reps 4 8  -LC      Additional Comments 8#s  -LC      Exercise 5    Exercise Name 5 scaption  -LC      Sets 5 3  -LC      Reps 5 8  -LC      Additional Comments 4#s  -LC      Exercise 6    Exercise Name 6 pec stretch  -LC      Sets 6  1  -LC      Reps 6 10  -LC      Time (Seconds) 6 10  -LC      Exercise 7    Exercise Name 7 Y's  -LC      Sets 7 3  -LC      Reps 7 8  -LC      Exercise 8    Exercise Name 8 UBE  -LC      Time (Minutes) 8 4  -LC      Exercise 9    Exercise Name 9 overhead press  -LC      Sets 9 3  -LC      Reps 9 8  -LC        User Key  (r) = Recorded By, (t) = Taken By, (c) = Cosigned By    Initials Name Provider Type    LACEY Hudson PT DPT Physical Therapist                                   Therapy Education       09/25/17 1200          Therapy Education    Given HEP;Symptoms/condition management;Pain management  -LC      Program Reinforced  -LC      How Provided Verbal;Demonstration  -LC      Provided to Patient  -LC      Level of Understanding Teach back education performed;Verbalized;Demonstrated  -LC        User Key  (r) = Recorded By, (t) = Taken By, (c) = Cosigned By    Initials Name Provider Type    LACEY Hudson PT DPT Physical Therapist                Time Calculation:   Start Time: 1030  Stop Time: 1115  Time Calculation (min): 45 min  Total Timed Code Minutes- PT: 45 minute(s)    Therapy Charges for Today     Code Description Service Date Service Provider Modifiers Qty    29163194825  PT THER PROC EA 15 MIN 9/25/2017 Rayray Hudson PT DPT GP 2    05343224747 HC PT ULTRASOUND EA 15 MIN 9/25/2017 Rayray Hudson PT DPT GP 1                    Rayray Hudson PT DPT  9/25/2017

## 2017-09-28 ENCOUNTER — HOSPITAL ENCOUNTER (OUTPATIENT)
Dept: PHYSICAL THERAPY | Facility: HOSPITAL | Age: 57
Setting detail: THERAPIES SERIES
Discharge: HOME | End: 2017-09-28

## 2017-09-28 DIAGNOSIS — M54.50 LUMBAR SPINE PAIN: Primary | ICD-10-CM

## 2017-09-28 PROCEDURE — 97110 THERAPEUTIC EXERCISES: CPT | Performed by: PHYSICAL THERAPIST

## 2017-09-28 PROCEDURE — 97035 APP MDLTY 1+ULTRASOUND EA 15: CPT | Performed by: PHYSICAL THERAPIST

## 2017-09-28 NOTE — THERAPY TREATMENT NOTE
Outpatient Physical Therapy Ortho Treatment Note  [facility]     Patient Name: Yolanda J Hatchett  : 1960  MRN: [id number]  Today's Date: 2017      Visit Date: 2017    Visit Dx:    ICD-10-CM ICD-9-CM   1. Lumbar spine pain M54.5 724.2       Patient Active Problem List   Diagnosis   • Lung cancer, upper lobe        Past Medical History:   Diagnosis Date   • Asthma    • Bleeding of blood vessel 2011    BRAIN   • Carpal tunnel syndrome on right    • COPD (chronic obstructive pulmonary disease)     MODERATE   • DDD (degenerative disc disease), cervical 2014    MODERATE C6-C7, SEEING DR. EWING   • Fibroids     UTERINE X 2   • Hypertension    • MI (myocardial infarction) 2009   • Non-small cell carcinoma of lung     Stabe IIA   • Ovarian cyst     RIGHT   • Pain of right upper extremity 2015   • Pelvic pain    • Right shoulder pain    • SOB (shortness of breath)    • Thyroid nodule    • Vertigo 2015   • Weight loss         Past Surgical History:   Procedure Laterality Date   • LUNG LOBECTOMY Right 2012    upper lobectomy and lymphadenectomy             PT Ortho       17 1100    Subjective Comments    Subjective Comments Pt says that she is sore in her neck and R shoulder today following several busy days at work. She reports pain radiating from neck down into shoulder. Pt reports she continues to have relieve of low back and neck pain by performing HEP.  -LC    Subjective Pain    Able to rate subjective pain? yes  -LC    Pre-Treatment Pain Level 4  -LC    Post-Treatment Pain Level 2  -LC      User Key  (r) = Recorded By, (t) = Taken By, (c) = Cosigned By    Initials Name Provider Type    LACEY Hudson, PT DPT Physical Therapist                            PT Assessment/Plan       17 1112       PT Assessment    Functional Limitations Limitations in functional capacity and performance;Performance in work activities  -LC     Impairments Joint mobility;Joint  integrity;Endurance;Impaired flexibility;Muscle strength;Pain  -LC     Assessment Comments Pt reports pain 4/10 today. She says that she had a much busier week at work over the past few days and she can tell that she is sore from this. She reports that she had immediate relief of low back pain following  performance of HEP. She continues to progress with upper extremity and core strengthening therex at PT, demonstrating increased resistance with all exercises. She will be progressed to D/C with HEP over next 2 visits.  -LC     PT Plan    PT Plan Comments Pt requires continued skilled therapy to include therex, manual, strengthening, stretching, and ROM.  -LC       User Key  (r) = Recorded By, (t) = Taken By, (c) = Cosigned By    Initials Name Provider Type    LACEY Hudson, PT DPT Physical Therapist                Modalities       09/28/17 1100          Ultrasound [id number]    Location R upper trap  -LC      Rx Minutes 8 min  -LC      Frequency 1.0 MHz  -LC      Intensity - Wts/cm 0.5  -LC        User Key  (r) = Recorded By, (t) = Taken By, (c) = Cosigned By    Initials Name Provider Type    LACEY Hudson, PT DPT Physical Therapist                Exercises       09/28/17 1100          Subjective Comments    Subjective Comments Pt says that she is sore in her neck and R shoulder today following several busy days at work. She reports pain radiating from neck down into shoulder. Pt reports she continues to have relieve of low back and neck pain by performing HEP.  -LC      Subjective Pain    Able to rate subjective pain? yes  -LC      Pre-Treatment Pain Level 4  -LC      Post-Treatment Pain Level 2  -LC      Exercise 1    Exercise Name 1 High Row  -LC      Sets 1 3  -LC      Reps 1 8  -LC      Exercise 2    Exercise Name 2 D1/D2 ext  -LC      Sets 2 3  -LC      Reps 2 8  -LC      Exercise 3    Exercise Name 3 ER  -LC      Sets 3 3  -LC      Reps 3 8  -LC      Exercise 4    Exercise Name 4 bent row  -LC      Sets 4  3  -LC      Reps 4 8  -LC      Exercise 5    Exercise Name 5 scaption  -LC      Sets 5 3  -LC      Reps 5 8  -LC      Exercise 6    Exercise Name 6 pec stretch  -LC      Sets 6 1  -LC      Reps 6 10  -LC      Time (Seconds) 6 10  -LC      Exercise 7    Exercise Name 7 Y's  -LC      Sets 7 3  -LC      Reps 7 8  -LC      Exercise 8    Exercise Name 8 UBE  -LC      Time (Minutes) 8 4  -LC      Exercise 9    Exercise Name 9 overhead press  -LC      Sets 9 3  -LC      Reps 9 8  -LC        User Key  (r) = Recorded By, (t) = Taken By, (c) = Cosigned By    Initials Name Provider Type    LACEY Hudson, PT DPT Physical Therapist                               PT OP Goals       09/28/17 1100       PT Short Term Goals    STG 1 Pt will be independent with HEP for improving lumbar spine mobility and cervical spine mobility to help relieve pain.  -LC     STG 1 Progress Met  -LC     STG 2 Pt will report 15% improvement on NDI to indicate improved functional ability.  -LC     STG 2 Progress Progressing  -LC     STG 3 Pt will report pain no greater than 3/10 with ADL's and work.  -LC     STG 3 Progress Progressing  -LC       User Key  (r) = Recorded By, (t) = Taken By, (c) = Cosigned By    Initials Name Provider Type    LACEY Hudson, PT DPT Physical Therapist                Therapy Education       09/28/17 1112          Therapy Education    Given HEP;Symptoms/condition management;Pain management  -LC      Program Reinforced  -LC      How Provided Verbal;Demonstration  -LC      Provided to Patient  -LC      Level of Understanding Teach back education performed;Verbalized;Demonstrated  -        User Key  (r) = Recorded By, (t) = Taken By, (c) = Cosigned By    Initials Name Provider Type    LACEY Hudson, PT DPT Physical Therapist                Time Calculation:   Start Time: 1030  Stop Time: 1110  Time Calculation (min): 40 min  Total Timed Code Minutes- PT: 40 minute(s)    Therapy Charges for Today     Code Description  Service Date Service Provider Modifiers Qty    98640907352 HC PT THER PROC EA 15 MIN 9/28/2017 Rayray Hudson, PT DPT GP 2    98160380500 HC PT ULTRASOUND EA 15 MIN 9/28/2017 Rayray Hudson, PT DPT GP 1                    Rayray Hudson, PT DPT  9/28/2017

## 2017-10-02 ENCOUNTER — HOSPITAL ENCOUNTER (OUTPATIENT)
Dept: PHYSICAL THERAPY | Facility: HOSPITAL | Age: 57
Setting detail: THERAPIES SERIES
Discharge: HOME | End: 2017-10-02

## 2017-10-02 DIAGNOSIS — M54.50 LUMBAR SPINE PAIN: Primary | ICD-10-CM

## 2017-10-02 PROCEDURE — 97035 APP MDLTY 1+ULTRASOUND EA 15: CPT | Performed by: PHYSICAL THERAPIST

## 2017-10-02 PROCEDURE — 97110 THERAPEUTIC EXERCISES: CPT | Performed by: PHYSICAL THERAPIST

## 2017-10-02 PROCEDURE — 97140 MANUAL THERAPY 1/> REGIONS: CPT | Performed by: PHYSICAL THERAPIST

## 2017-10-02 NOTE — THERAPY PROGRESS REPORT/RE-CERT
Outpatient Physical Therapy Ortho Re-Assessment  [facility]     Patient Name: Yolanda J Hatchett  : 1960  MRN: [id number]  Today's Date: 10/2/2017      Visit Date: 10/02/2017    Patient Active Problem List   Diagnosis   • Lung cancer, upper lobe        Past Medical History:   Diagnosis Date   • Asthma    • Bleeding of blood vessel 2011    BRAIN   • Carpal tunnel syndrome on right    • COPD (chronic obstructive pulmonary disease)     MODERATE   • DDD (degenerative disc disease), cervical 2014    MODERATE C6-C7, SEEING DR. EWING   • Fibroids     UTERINE X 2   • Hypertension    • MI (myocardial infarction) 2009   • Non-small cell carcinoma of lung     Stabe IIA   • Ovarian cyst     RIGHT   • Pain of right upper extremity 2015   • Pelvic pain    • Right shoulder pain    • SOB (shortness of breath)    • Thyroid nodule    • Vertigo 2015   • Weight loss         Past Surgical History:   Procedure Laterality Date   • LUNG LOBECTOMY Right     upper lobectomy and lymphadenectomy       Visit Dx:     ICD-10-CM ICD-9-CM   1. Lumbar spine pain M54.5 724.2                 PT Ortho       10/02/17 1100    Subjective Comments    Subjective Comments Pt reports that she continues to be able to manage her pain with HEP. She reports that pain typically flares up following work, but stretches and therex help alleviate.  -LC    Subjective Pain    Able to rate subjective pain? yes  -LC    Pre-Treatment Pain Level 3  -LC    Post-Treatment Pain Level 2  -LC      User Key  (r) = Recorded By, (t) = Taken By, (c) = Cosigned By    Initials Name Provider Type    LACEY Hudson, PT DPT Physical Therapist                            Therapy Education       10/02/17 1148          Therapy Education    Given HEP;Symptoms/condition management;Pain management  -LC      Program Modified  -LC      How Provided Written;Verbal;Demonstration  -LC      Provided to Patient  -LC      Level of Understanding Teach  back education performed;Verbalized;Demonstrated  -        User Key  (r) = Recorded By, (t) = Taken By, (c) = Cosigned By    Initials Name Provider Type    LACEY Hudson, PT DPT Physical Therapist                PT OP Goals       10/02/17 1100       PT Short Term Goals    STG 1 Pt will be independent with HEP for improving lumbar spine mobility and cervical spine mobility to help relieve pain.  -     STG 1 Progress Met  -     STG 2 Pt will report 15% improvement on NDI to indicate improved functional ability.  -     STG 2 Progress Progressing  -     STG 3 Pt will report pain no greater than 3/10 with ADL's and work.  -     STG 3 Progress Progressing  -     STG 3 Progress Comments Reports intermittent episodes of pain up to 4-5/10 with burning sensation.  -     Time Calculation    PT Goal Re-Cert Due Date 11/02/17  -       User Key  (r) = Recorded By, (t) = Taken By, (c) = Cosigned By    Initials Name Provider Type    LACEY Hudson, PT DPT Physical Therapist                PT Assessment/Plan       10/02/17 1149       PT Assessment    Functional Limitations Limitations in functional capacity and performance;Performance in work activities  -     Impairments Joint mobility;Joint integrity;Endurance;Impaired flexibility;Muscle strength;Pain  -     Assessment Comments Pt has pain 4/10 today. She reports pain in lumbar spine 0/10. She has burning sensation into R upper trap. She reports continued relief of pain with performance of HEP at home. She demonstrates BUE MMT grossly 4+/5 pain free. Pt is pleased with the progress she was made. She was educated on median nerve glide to help alleviate pain into R hand and issued written copy of therex.  -     PT Plan    PT Plan Comments Pt requires continued skilled therapy to improve funcitonal ability of RUE and alleviate pain with work activities by improving head and neck posture.  -       User Key  (r) = Recorded By, (t) = Taken By, (c) =  Cosigned By    Initials Name Provider Type    LC Rayray HUDDLESTON Tristan, PT DPT Physical Therapist                Modalities       10/02/17 1100          Ultrasound [id number]    Location R upper trap  -LC      Rx Minutes 8 min  -LC      Frequency 1.0 MHz  -LC      Intensity - Wts/cm 0.5  -LC        User Key  (r) = Recorded By, (t) = Taken By, (c) = Cosigned By    Initials Name Provider Type    LC Rayray HUDDLESTON Tristan, PT DPT Physical Therapist              Exercises       10/02/17 1100          Subjective Comments    Subjective Comments Pt reports that she continues to be able to manage her pain with HEP. She reports that pain typically flares up following work, but stretches and therex help alleviate.  -LC      Subjective Pain    Able to rate subjective pain? yes  -LC      Pre-Treatment Pain Level 3  -LC      Post-Treatment Pain Level 2  -LC      Exercise 1    Exercise Name 1 High Row  -LC      Sets 1 3  -LC      Reps 1 8  -LC      Exercise 2    Exercise Name 2 D1/D2 ext  -LC      Sets 2 3  -LC      Reps 2 8  -LC      Exercise 3    Exercise Name 3 ER  -LC      Sets 3 3  -LC      Reps 3 8  -LC      Exercise 4    Exercise Name 4 bent row  -LC      Sets 4 3  -LC      Reps 4 8  -LC      Exercise 5    Exercise Name 5 scaption  -LC      Sets 5 3  -LC      Reps 5 8  -LC      Exercise 6    Exercise Name 6 pec stretch  -LC      Sets 6 1  -LC      Reps 6 10  -LC      Time (Seconds) 6 10  -LC      Exercise 7    Exercise Name 7 Y's  -LC      Sets 7 3  -LC      Reps 7 8  -LC      Exercise 8    Exercise Name 8 UBE  -LC      Time (Minutes) 8 4  -LC      Exercise 9    Exercise Name 9 overhead press  -LC      Sets 9 3  -LC      Reps 9 8  -LC      Additional Comments 3#s  -LC        User Key  (r) = Recorded By, (t) = Taken By, (c) = Cosigned By    Initials Name Provider Type    LACEY Rayray HUDDLESTON Tristan, PT DPT Physical Therapist           Manual Rx (last 36 hours)      Manual Treatments       10/02/17 1100          Manual Rx 1    Manual Rx 1 Location R  shoulder  -LC      Manual Rx 1 Type upper trap release, nerve glide, anterior capsule stretch  -LC      Manual Rx 1 Duration 10 min  -        User Key  (r) = Recorded By, (t) = Taken By, (c) = Cosigned By    Initials Name Provider Type     Rayray Hudson, PT DPT Physical Therapist                            Time Calculation:   Start Time: 1105  Stop Time: 1145  Time Calculation (min): 40 min  Total Timed Code Minutes- PT: 40 minute(s)     Therapy Charges for Today     Code Description Service Date Service Provider Modifiers Qty    95001185442  PT THER PROC EA 15 MIN 10/2/2017 Rayray Hudson, PT DPT GP 1    91388606091 HC PT MANUAL THERAPY EA 15 MIN 10/2/2017 Rayary Hudson, PT DPT GP 1    [id number]  PT ULTRASOUND EA 15 MIN 10/2/2017 Rayray Hudson, PT DPT GP 1                    Rayray Hudson, PT DPT  10/2/2017

## 2017-10-05 ENCOUNTER — HOSPITAL ENCOUNTER (OUTPATIENT)
Dept: PHYSICAL THERAPY | Facility: HOSPITAL | Age: 57
Setting detail: THERAPIES SERIES
Discharge: HOME | End: 2017-10-05

## 2017-10-05 PROCEDURE — 97110 THERAPEUTIC EXERCISES: CPT | Performed by: PHYSICAL THERAPIST

## 2017-10-05 PROCEDURE — 97140 MANUAL THERAPY 1/> REGIONS: CPT | Performed by: PHYSICAL THERAPIST

## 2017-10-05 NOTE — THERAPY DISCHARGE NOTE
Outpatient Physical Therapy Ortho Treatment Note/Discharge Summary  [facility]     Patient Name: Yolanda J Hatchett  : 1960  MRN: [id number]  Today's Date: 10/5/2017      Visit Date: 10/05/2017    Visit Dx:  No diagnosis found.    Patient Active Problem List   Diagnosis   • Lung cancer, upper lobe        Past Medical History:   Diagnosis Date   • Asthma    • Bleeding of blood vessel 2011    BRAIN   • Carpal tunnel syndrome on right    • COPD (chronic obstructive pulmonary disease)     MODERATE   • DDD (degenerative disc disease), cervical 2014    MODERATE C6-C7, SEEING DR. EWING   • Fibroids     UTERINE X 2   • Hypertension    • MI (myocardial infarction) 2009   • Non-small cell carcinoma of lung     Stabe IIA   • Ovarian cyst     RIGHT   • Pain of right upper extremity 2015   • Pelvic pain    • Right shoulder pain    • SOB (shortness of breath)    • Thyroid nodule    • Vertigo 2015   • Weight loss         Past Surgical History:   Procedure Laterality Date   • LUNG LOBECTOMY Right     upper lobectomy and lymphadenectomy             PT Ortho       10/05/17 1100    Subjective Comments    Subjective Comments Pt reports that she feels that she is able to manage her low back and cervical pain with her HEP at home. She reports no referred pain into any extremity for the past several weeks.  -LC    Subjective Pain    Able to rate subjective pain? yes  -LC    Pre-Treatment Pain Level 4  -LC    Post-Treatment Pain Level 2  -LC    Left Hip    Hip Flexion Gross Movement (4+/5) good plus  -LC    Hip ABduction Gross Movement (4+/5) good plus  -LC    Hip ADduction Gross Movement (4+/5) good plus  -LC    Right Hip    Hip Flexion Gross Movement (4+/5) good plus  -LC    Hip ABduction Gross Movement (4+/5) good plus  -LC    Hip ADduction Gross Movement (4+/5) good plus  -LC    Left Knee    Knee Extension Gross Movement (4+/5) good plus  -LC    Knee Flexion Gross Movement (4+/5) good  plus  -LC    Right Knee    Knee Extension Gross Movement (4+/5) good plus  -LC    Knee Flexion Gross Movement (4+/5) good plus  -LC      User Key  (r) = Recorded By, (t) = Taken By, (c) = Cosigned By    Initials Name Provider Type    LACEY Hudson, PT DPT Physical Therapist                            PT Assessment/Plan       10/05/17 1131       PT Assessment    Functional Limitations Limitations in functional capacity and performance;Performance in work activities  -     Impairments Joint mobility;Joint integrity;Endurance;Impaired flexibility;Muscle strength;Pain  -LC     Assessment Comments Pt reports pain 4/10 following a hard day at work yesterday. Lumbar pain remains 0/10.Pt has no referred pain from R upper trap into arm. She is able to manage her pain at home with her HEP and is pleased with the progress she has made. She demonstrates BUE and BLE MMT grossly 4+/5 pain free. Pt will be D/C to independence with HEP.  -LC     PT Plan    PT Plan Comments Pt D/C to independence with HEP.  -       User Key  (r) = Recorded By, (t) = Taken By, (c) = Cosigned By    Initials Name Provider Type    LACEY Hudson, PT DPT Physical Therapist                    Exercises       10/05/17 1100          Subjective Comments    Subjective Comments Pt reports that she feels that she is able to manage her low back and cervical pain with her HEP at home. She reports no referred pain into any extremity for the past several weeks.  -LC      Subjective Pain    Able to rate subjective pain? yes  -LC      Pre-Treatment Pain Level 4  -LC      Post-Treatment Pain Level 2  -LC      Exercise 1    Exercise Name 1 High Row  -LC      Sets 1 3  -LC      Reps 1 8  -LC      Exercise 2    Exercise Name 2 D1/D2 ext  -LC      Sets 2 3  -LC      Reps 2 8  -LC      Exercise 3    Exercise Name 3 ER  -LC      Sets 3 3  -LC      Reps 3 8  -LC      Exercise 4    Exercise Name 4 bent row  -LC      Sets 4 3  -LC      Reps 4 8  -LC      Exercise 5     Exercise Name 5 scaption  -LC      Sets 5 3  -LC      Reps 5 8  -LC      Exercise 6    Exercise Name 6 pec stretch  -LC      Sets 6 1  -LC      Reps 6 10  -LC      Time (Seconds) 6 10  -LC      Exercise 7    Exercise Name 7 Y's  -LC      Sets 7 3  -LC      Reps 7 8  -LC      Exercise 8    Exercise Name 8 UBE  -LC      Time (Minutes) 8 4  -LC      Exercise 9    Exercise Name 9 overhead press  -LC      Sets 9 3  -LC      Reps 9 8  -LC        User Key  (r) = Recorded By, (t) = Taken By, (c) = Cosigned By    Initials Name Provider Type    LACEY Hudson, PT DPT Physical Therapist                        Manual Rx (last 36 hours)      Manual Treatments       10/05/17 1100          Manual Rx 1    Manual Rx 1 Location R shoulder  -LC      Manual Rx 1 Type upper trap release, nerve glide, anterior capsule stretch  -LC      Manual Rx 1 Duration 10 min  -LC        User Key  (r) = Recorded By, (t) = Taken By, (c) = Cosigned By    Initials Name Provider Type    LACEY Hudson, PT DPT Physical Therapist                PT OP Goals       10/05/17 1100       PT Short Term Goals    STG 1 Pt will be independent with HEP for improving lumbar spine mobility and cervical spine mobility to help relieve pain.  -LC     STG 1 Progress Met  -LC     STG 2 Pt will report 15% improvement on NDI to indicate improved functional ability.  -LC     STG 2 Progress Not Met  -LC     STG 3 Pt will report pain no greater than 3/10 with ADL's and work.  -LC     STG 3 Progress Partially Met  -LC     STG 3 Progress Comments Pt reports pain 2/10 the majority of the time with adls  -       User Key  (r) = Recorded By, (t) = Taken By, (c) = Cosigned By    Initials Name Provider Type    LACEY Hudson, PT DPT Physical Therapist                Therapy Education       10/05/17 1130          Therapy Education    Given HEP;Symptoms/condition management;Pain management  -      Program Reinforced  -      How Provided Verbal;Demonstration  -       Provided to Patient  -LACEY      Level of Understanding Teach back education performed;Verbalized;Demonstrated  -        User Key  (r) = Recorded By, (t) = Taken By, (c) = Cosigned By    Initials Name Provider Type    LC Rayray Hudson PT DPT Physical Therapist                Time Calculation:   Start Time: 1030  Stop Time: 1110  Time Calculation (min): 40 min  Total Timed Code Minutes- PT: 40 minute(s)    Therapy Charges for Today     Code Description Service Date Service Provider Modifiers Qty    [id number]  PT THER PROC EA 15 MIN 10/5/2017 Rayray Hudson PT DPT GP 2    [id number] HC PT MANUAL THERAPY EA 15 MIN 10/5/2017 Rayray Hudson PT DPT GP 1                OP PT Discharge Summary  Date of Discharge: 10/05/17  Reason for Discharge: Maximum functional potential achieved, Independent  Outcomes Achieved: Patient able to partially acheive established goals, Refer to plan of care for updates on goals achieved  Discharge Destination: Home with home program  Discharge Instructions: Pt instructed to contact PT if any change in status or any questions regarding HEP.      CAITLIN AndersonT  10/5/2017

## 2018-04-13 ENCOUNTER — APPOINTMENT (OUTPATIENT)
Dept: LAB | Facility: HOSPITAL | Age: 58
End: 2018-04-13

## 2018-04-13 ENCOUNTER — APPOINTMENT (OUTPATIENT)
Dept: GENERAL RADIOLOGY | Facility: HOSPITAL | Age: 58
End: 2018-04-13

## 2018-04-13 ENCOUNTER — APPOINTMENT (OUTPATIENT)
Dept: ONCOLOGY | Facility: CLINIC | Age: 58
End: 2018-04-13

## 2018-04-30 DIAGNOSIS — C34.11 MALIGNANT NEOPLASM OF UPPER LOBE OF RIGHT LUNG (HCC): Primary | ICD-10-CM

## 2018-05-04 ENCOUNTER — APPOINTMENT (OUTPATIENT)
Dept: ONCOLOGY | Facility: CLINIC | Age: 58
End: 2018-05-04

## 2018-05-04 ENCOUNTER — APPOINTMENT (OUTPATIENT)
Dept: LAB | Facility: HOSPITAL | Age: 58
End: 2018-05-04

## 2018-05-04 ENCOUNTER — APPOINTMENT (OUTPATIENT)
Dept: GENERAL RADIOLOGY | Facility: HOSPITAL | Age: 58
End: 2018-05-04

## 2018-05-23 ENCOUNTER — OFFICE VISIT (OUTPATIENT)
Dept: ONCOLOGY | Facility: CLINIC | Age: 58
End: 2018-05-23

## 2018-05-23 ENCOUNTER — APPOINTMENT (OUTPATIENT)
Dept: GENERAL RADIOLOGY | Facility: HOSPITAL | Age: 58
End: 2018-05-23

## 2018-05-23 ENCOUNTER — LAB (OUTPATIENT)
Dept: LAB | Facility: HOSPITAL | Age: 58
End: 2018-05-23

## 2018-05-23 VITALS
DIASTOLIC BLOOD PRESSURE: 60 MMHG | HEART RATE: 76 BPM | TEMPERATURE: 98.5 F | OXYGEN SATURATION: 98 % | BODY MASS INDEX: 20.48 KG/M2 | HEIGHT: 63 IN | WEIGHT: 115.6 LBS | SYSTOLIC BLOOD PRESSURE: 110 MMHG | RESPIRATION RATE: 16 BRPM

## 2018-05-23 DIAGNOSIS — C34.11 MALIGNANT NEOPLASM OF UPPER LOBE OF RIGHT LUNG (HCC): Primary | ICD-10-CM

## 2018-05-23 DIAGNOSIS — C34.11 MALIGNANT NEOPLASM OF UPPER LOBE OF RIGHT LUNG (HCC): ICD-10-CM

## 2018-05-23 LAB
ALBUMIN SERPL-MCNC: 3.9 G/DL (ref 3.5–5.2)
ALBUMIN/GLOB SERPL: 1 G/DL (ref 1.1–2.4)
ALP SERPL-CCNC: 106 U/L (ref 38–116)
ALT SERPL W P-5'-P-CCNC: 33 U/L (ref 0–33)
ANION GAP SERPL CALCULATED.3IONS-SCNC: 10.9 MMOL/L
AST SERPL-CCNC: 32 U/L (ref 0–32)
BASOPHILS # BLD AUTO: 0.04 10*3/MM3 (ref 0–0.1)
BASOPHILS NFR BLD AUTO: 0.7 % (ref 0–1.1)
BILIRUB SERPL-MCNC: 0.4 MG/DL (ref 0.1–1.2)
BUN BLD-MCNC: 14 MG/DL (ref 6–20)
BUN/CREAT SERPL: 15.1 (ref 7.3–30)
CALCIUM SPEC-SCNC: 9.6 MG/DL (ref 8.5–10.2)
CHLORIDE SERPL-SCNC: 105 MMOL/L (ref 98–107)
CO2 SERPL-SCNC: 26.1 MMOL/L (ref 22–29)
CREAT BLD-MCNC: 0.93 MG/DL (ref 0.6–1.1)
DEPRECATED RDW RBC AUTO: 41.6 FL (ref 37–49)
EOSINOPHIL # BLD AUTO: 0.43 10*3/MM3 (ref 0–0.36)
EOSINOPHIL NFR BLD AUTO: 7.1 % (ref 1–5)
ERYTHROCYTE [DISTWIDTH] IN BLOOD BY AUTOMATED COUNT: 14.1 % (ref 11.7–14.5)
GFR SERPL CREATININE-BSD FRML MDRD: 75 ML/MIN/1.73
GLOBULIN UR ELPH-MCNC: 3.8 GM/DL (ref 1.8–3.5)
GLUCOSE BLD-MCNC: 74 MG/DL (ref 74–124)
HCT VFR BLD AUTO: 40.7 % (ref 34–45)
HGB BLD-MCNC: 13.4 G/DL (ref 11.5–14.9)
IMM GRANULOCYTES # BLD: 0.01 10*3/MM3 (ref 0–0.03)
IMM GRANULOCYTES NFR BLD: 0.2 % (ref 0–0.5)
LYMPHOCYTES # BLD AUTO: 3.06 10*3/MM3 (ref 1–3.5)
LYMPHOCYTES NFR BLD AUTO: 50.3 % (ref 20–49)
MCH RBC QN AUTO: 27.1 PG (ref 27–33)
MCHC RBC AUTO-ENTMCNC: 32.9 G/DL (ref 32–35)
MCV RBC AUTO: 82.2 FL (ref 83–97)
MONOCYTES # BLD AUTO: 0.46 10*3/MM3 (ref 0.25–0.8)
MONOCYTES NFR BLD AUTO: 7.6 % (ref 4–12)
NEUTROPHILS # BLD AUTO: 2.08 10*3/MM3 (ref 1.5–7)
NEUTROPHILS NFR BLD AUTO: 34.1 % (ref 39–75)
NRBC BLD MANUAL-RTO: 0 /100 WBC (ref 0–0)
PLATELET # BLD AUTO: 266 10*3/MM3 (ref 150–375)
PMV BLD AUTO: 8.8 FL (ref 8.9–12.1)
POTASSIUM BLD-SCNC: 3.8 MMOL/L (ref 3.5–4.7)
PROT SERPL-MCNC: 7.7 G/DL (ref 6.3–8)
RBC # BLD AUTO: 4.95 10*6/MM3 (ref 3.9–5)
SODIUM BLD-SCNC: 142 MMOL/L (ref 134–145)
WBC NRBC COR # BLD: 6.08 10*3/MM3 (ref 4–10)

## 2018-05-23 PROCEDURE — 85025 COMPLETE CBC W/AUTO DIFF WBC: CPT | Performed by: INTERNAL MEDICINE

## 2018-05-23 PROCEDURE — 80053 COMPREHEN METABOLIC PANEL: CPT | Performed by: INTERNAL MEDICINE

## 2018-05-23 PROCEDURE — 71046 X-RAY EXAM CHEST 2 VIEWS: CPT

## 2018-05-23 PROCEDURE — 36415 COLL VENOUS BLD VENIPUNCTURE: CPT | Performed by: INTERNAL MEDICINE

## 2018-05-23 PROCEDURE — 99213 OFFICE O/P EST LOW 20 MIN: CPT | Performed by: INTERNAL MEDICINE

## 2018-05-23 RX ORDER — MECLIZINE HYDROCHLORIDE 25 MG/1
25 TABLET ORAL EVERY 8 HOURS PRN
Qty: 60 TABLET | Refills: 1 | Status: SHIPPED | OUTPATIENT
Start: 2018-05-23 | End: 2021-03-05

## 2018-05-23 NOTE — PROGRESS NOTES
Subjective .  Continue pain pain management    REASONS FOR FOLLOWUP:    1. Nonsmall cell lung carcinoma, stage IIA (down staged from presumed stage IIIA). The patient received neoadjuvant chemotherapy with carboplatin/Alimta x2. Followup scans showed considerable response.   2. The patient is status post right upper lobectomy and lymphadenectomy (discharged 03/07/2012).   3. Per Thoracic Conference, additional two cycles of adjuvant carboplatin/Alimta was advised and initiated on 04/11/2012.   4. Patient seen 5/1 and then again 5/21/2012, side effects slowly resolving per carboplatin/Alimta chemotherapy.   5. ? Increasing claudication symptoms left lower extremity, vascular assessment planned.   6. Evaluation per vascular surgery negative, except for bilateral distal carotid stenosis, chest x-ray on 8/27/2012 without new abnormality.   7. Triage visit for evaluation of nail fungus.   8. Patient seen on 2/25/13 with recent chest x-ray from January 2013 with no new findings, evidence of sinopulmonary infection; short course of Augmentin initiated.   9. Patient seen on 7/15 with weight loss noted (?), followup scans to be scheduled. Port maintenance continued.   10. Followup scans were negative for evidence of recurrent disease, weight loss improving. Port to be removed thereafter.   11. The patient was seen on 01/08/2013 with increasing right lower extremity pain, brief interval to prolonged, followup scans scheduled in advance.   12. Follow-up repeat CT scan of chest, abdomen, and pelvis without evidence of recurrent disease, except for enlarging right ovarian/adrenal cystic nodule, ? ovarian cyst, further weight loss noted, plans for repeat thyroid function test analysis, OB/GYN assessment, and initiate nutritional assessment.   13. The patient was seen 05/06/14 with further weight loss (?), assessments to date negative, pulmonary assessment of presumptive COPD.   14. The patient was 08/26/2014, moderate COPD,  chronic obstructive pulmonary disease recognized. Long-term steroid treatment plan, orthopedic assessment for right shoulder planned.   15. The patient is status post orthopedic assessment and injection therapy?   16. The patient was seen on 05/13/2015 - recent diagnosis of vertigo, responsive to meclizine, every 6 month followup planned.   17. Patient seen 11/18/2015 with increasing right upper extremity pain, ?, swelling. Follow up CT of the chest planned.    18. Patient assesse d by repeat CT chest, abdomen, and pelvis found to be negative for evidence of recurrent disease,? additional low back assessment ongoing.   19. Patient reviewed November 04, 2016 stable, undergoing therapy and pain management  20. Patient reviewed April 21, 2017, chest x-ray without new abnormality, smoking cessation ongoing-down to 1 pack per week  21. Patient reviewed May 23, 2018, neck and back pain reviewed by orthopedics    HISTORY OF PRESENT ILLNESS:  The patient is a 58 y.o. year old female who is here for follow-up with the above-mentioned history.    History of Present Illness       This 58-year-old  female with a history of non-small cell lung carcino ma, having completed both neoadjuvant and then adjuvant chemotherapy by May 2012. She has returned back for routine assessment and Mediport flushes and fortunately has done well up to this point. She did unfortunately recently have evidence of respirator y infection, which has found very difficult to clear and having increasing sinus symptoms. The record does indicate an assessment on 1/14/13 with Dr. Amaro. He found no evidence of recurrent disease. This included a chest x-ray, PA and lateral done on 1 / 14/13 showing good aeration of bilateral lung fields with no infiltrates, effusion, masses or nodules seen. Again, as the patient returns on 2/25/13 she has been having a respiratory infection that was treated thereafter. She was asked to followup and i s now seen  on 7/15/13. She had repeat chest x-ray in May 2013 without abnormality.   When seen 07/15/13 she indicated she lost additional weight since last seen and she has been slowly losing weight. She states she feels well with no additional issues, but wonders why she is losing weight and of course is of some concern.   We had followup scans performed 08/06/13 that fortunately show no evidence of neck lymphadenopathy though shotty nodes remain; in the chest there are no suspicious pulmonary opacities or nodules, no pericardial effusion, and resolution of the previously seen pleural thickening in the lateral aspect of the right middle lobe. No change in node in the right hilum with the node measuring 1.3 x 0.8, no mediastinal lymphadenopathy. In the a bdomen there were no suspicious liver lesions. There continue to be two small uterine fibroids, unchanged, and a slight interval increase in a 2.9 cm right ovarian cyst, previously 2.5 cm. The patient has had no clear abnormalities seen on scans. She s tates she is now currently working on a job that unfortunately is not well ventilated, where there is considerable dust. She has had sinus symptoms as a result of this.   The patient thereafter did quite well in fact returned to different job which she is currently is still pursuing. She is active, however, doing quite a bit of labor. She did [employer] shopping and had pain in the right lower extremity with a degree of numbness also recognized. Upon rest this did improve, but it is bothering her as to why it ever developed and also the fact that she has been losing weight without any effort. Fortunately this has not been extreme, but is also concerning.   The patient thereafter was asked to undergo repeat studies and underwent repeat CT scan of chest, abdo men, and pelvis on 01/29/2014. These are fortunately without any evidence of recurrent disease. There is a noncalcified 3 mm, stable pulmonary nodule, stable pleural  thickening, and retraction of right apex, stable 13 x 8 mm right hilar lymph node, findin g s per abdomen and pelvis with a stable 12 mm left adrenal nodule thought to be adenomatous, right renal cortical cyst, retrocrural lymph node that is approximately the same as it was previous, right ovarian/adnexal cystic nodule that is increased in size f rom 2.9 to 3.6. The patient does have some pelvic pain on occasion that may be related to the right ovarian cystic nodule. Otherwise, she feels reasonably good, though she is not sleeping particularly well, only 3 or 4 hours per night. Her appetite is goo d, i.e., stable, 2 meals a day. She is working routinely at a physical job and though once again notes further weight loss since last being.   The patient thereafter was asked return for followup. When seen 5/6 she states that her job has become a bit more physically taxing and she has lost additional weight since last seen, and I do find that her weight is 107.8 pounds compared to 111.6 on 2/5. She is not having pain but has noted increasing shortness of breath with moderate exertion. Her appetite remains good overall. She has evidently not seen OB/GYN at this point.   The patient was referred to pulmonary medicine for their assessment. It was felt she had moderate COPD, had her start ProAir and Symbicort and advised to quit smoking. The patient has been t rying to do the latter with the use of any cigarettes and has made some progress. She has lost, however, a moderate degree of additional weight and has been having some right shoulder pain seen upon repetitive use.   The patient went on to see orthopedics, particularly Dr. Healy. His notes from 09/22/2014 are consistent with moderate degenerative disk disease at C6-C7 with loss of intervertebral height with some osteophytic osteophyte formation. He felt she had a potential radiculopathy as well as right carpal tunnel syndrome. She had improved somewhat on steroids.  Additional considerations were cervical traction and/or additional diagnostic injections.   The patient thereafter states that as she is tapered off steroids used to stimulate her performance status - her pain has unfortunately become worse, but not to the degree previously that she had been experiencing. She indicates that she is considering going back to Dr. Healy for the injections that he had been previously offering.   The patient was asked to be seen again at 6 month followup, seen 05/13/2015. She relates a recent episode of apparent vertigo that was responsive to meclizine. It has not recurred with this medication.   The patient thereafter returns now for review 11/18/2015. 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 She is beginning to have some pain and swelling of her right upper extremity and wonders “exactly what this means” . The swelling increases during the workday into the night and then reduces by the a.m. but “never back to normal”. She also continues to use meclizine for vertigo with this medication helping her particularly at night.   The patient was asked to undergo general reevaluation via CT scanning. This was performed 12/01/2015, demonstrated no evidence of metastatic disease in chest, abdomen or pelvis. As the patient is seen it is further noted that she has no obvious bony abnormalities. She states that through her primary care she is being assessed wi th a lumbar spine MRI since she developed further back pain on top of already noted neck pain. This could well be work related and she plans to see him orthopedist Dr. Healy in the next several weeks as well. He had seen her last fall for neck pain.   The patient's been seeing orthopedics including Dr. Camara.  She is evidently not a surgical candidate and has been referred for pain management with a point of this week already planned.  She states incidentally that  her dizziness has improved with the current meclizine doses     The patient is now reviewed back November 4.  In the interval she's been seen by pain management and also physical therapy and undergoes treatment on a regular basis.  This is been mostly successful.  Her pain, however, is in part related to off includes quite a bit of labor.   Patient's now seen back in April 21, 2017.  She is, fortunately, doing well overall pain control general performance status.  She states, happily, that she is  and doing well.  She is moved to smoking cessation and is down to 1 pack per week.     Patient seen back May 23, 2018.  Over the last year she's had continued neck and low back pain now being seen by orthopedics.  She's been advised that additional surgery may be necessary that she hopes not to do this.  Chest x-ray in office today is not changed significant relief from previous.    Past Medical History:   Diagnosis Date   • Asthma    • Bleeding of blood vessel 01/05/2011    BRAIN   • Carpal tunnel syndrome on right    • COPD (chronic obstructive pulmonary disease)     MODERATE   • DDD (degenerative disc disease), cervical 09/22/2014    MODERATE C6-C7, SEEING DR. EWING   • Emphysema of lung    • Fibroids     UTERINE X 2   • Hypertension    • MI (myocardial infarction) 06/2009   • Mitral insufficiency    • Non-small cell carcinoma of lung     Stabe IIA   • Ovarian cyst     RIGHT   • Pain of right upper extremity 11/18/2015   • Pelvic pain    • Right shoulder pain    • SOB (shortness of breath)    • Thyroid nodule    • Vertigo 05/13/2015   • Weight loss        ONCOLOGIC HISTORY:  (History from previous dates can be found in the separate document.)    Current Outpatient Prescriptions on File Prior to Visit   Medication Sig Dispense Refill   • gabapentin (NEURONTIN) 100 MG capsule Take 100 mg by mouth Daily.     • HYDROcodone-acetaminophen (NORCO) 7.5-325 MG per tablet Take 1 tablet by mouth As Needed for moderate  "pain (4-6).     • nicotine (NICODERM CQ) 21 MG/24HR patch Place 1 patch on the skin Daily. 30 patch 2   • [DISCONTINUED] meclizine (ANTIVERT) 25 MG tablet Take 1 tablet by mouth Every 8 (Eight) Hours As Needed for dizziness. 60 tablet 1     No current facility-administered medications on file prior to visit.        ALLERGIES:     Allergies   Allergen Reactions   • No Known Drug Allergy        Social History     Social History   • Marital status:      Spouse name: N/A   • Number of children: N/A   • Years of education: College     Occupational History   •  [employer]     Social History Main Topics   • Smoking status: Current Every Day Smoker     Packs/day: 1.00     Years: 20.00     Types: Cigarettes   • Smokeless tobacco: Current User      Comment: DECREASED ABOUT TO 1/3 PPD ON 4-26-12   • Alcohol use Yes      Comment: OCCASIONAL   • Drug use: Unknown   • Sexual activity: Defer     Other Topics Concern   • Not on file     Social History Narrative   • No narrative on file         Cancer-related family history includes Brain cancer in her father; Cancer (age of onset: 47) in her mother.     Review of Systems  A comprehensive 14 point review of systems was performed and was negative except as mentioned.    Objective      Vitals:    05/23/18 1459   BP: 110/60   Pulse: 76   Resp: 16   Temp: 98.5 °F (36.9 °C)   SpO2: 98%  Comment: at rest   Weight: 52.4 kg (115 lb 9.6 oz)   Height: 160 cm (62.99\")  Comment: new ht. without shoes     Current Status 5/23/2018   ECOG score 0       Physical Exam    GENERAL: Thin appearing  female alert and oriented.   SKIN: Warm, dry without rashes, purpura or petechiae.   HEAD: Normocephalic.   EYES: Pupils equal, round and reactive to light. EOMs intact. Conjunctivae normal.   EARS: Hearing intact.   NOSE: Septum midline. No excoriations or nasal discharge.   MOUTH: Tongue is well-papillated; no stomatitis or ulcers. Lips normal.   THROAT: Oropharynx " without lesions or exudates.   NECK: Supple with good range of motion; no thyromegaly or masses, no JVD or bruits.   LYMPHATICS: No cervical, supraclavicular, axillary or inguinal adenopathy.   CHEST: Lungs clear to percussion and auscultation, prolonged expiratory phase noted bilaterally.   CARDIAC: Regular rate and rhythm without murmurs, rubs or gallops.   ABDOMEN: Soft, nontender with no organomegaly or masses.   EXTREMITIES: No clubbing, cyanosis or edema.   NEUROLOGICAL: No focal neurological deficits.     RECENT LABS:  Hematology WBC   Date Value Ref Range Status   05/23/2018 6.08 4.00 - 10.00 10*3/mm3 Final     RBC   Date Value Ref Range Status   05/23/2018 4.95 3.90 - 5.00 10*6/mm3 Final     Hemoglobin   Date Value Ref Range Status   05/23/2018 13.4 11.5 - 14.9 g/dL Final     Hematocrit   Date Value Ref Range Status   05/23/2018 40.7 34.0 - 45.0 % Final     Platelets   Date Value Ref Range Status   05/23/2018 266 150 - 375 10*3/mm3 Final        Assessment/Plan          A 58-year-old female with history of nonsmall cell lung carcinoma, stag e IIIA versus stage IIIB. She underwent neoadjuvant chemotherapy with excellent response, dropping to a pathologic stage II after surgical resection. She took additional courses of adjuvant carboplatin and Alimta in May of 2012. One year later, she had a d egree of weight loss that did not appear to be related to disease progression. This stabilized, though she had a degree of dizziness and subsequent scans of the brain were negative, ? inner ear issues. This did overall improve. Her weight loss, however, c ontinued and we have had her to undergo repeat scans to be certain whether this was not related to recurrent disease. We did not find recurrence of malignancy either on physical exam or scan. Metabolic studies when last seen included normal thyroid fun c tion, normal serum chemistries, normal renal function, normal LFTs, CEA 5.2. At this point it is uncertain why she  "has weight loss. She had subsequent pulmonary assessment, however, consistent with chronic obstructive pulmonary disease and when see 08/2 6 /2014, a trial of low-dose steroids were initiated, which were helpful. Orthopedically, she has been reviewed as above through Dr. Healy and she does continue to see him periodically. Now when reviewed on 05/13/2015, she has recently had vertigo. In fa ct, she describes on re-exam today, orthostasis. She has adjusted medications to the point that she \"takes hardly anything.\"   We will plan for her to hydrate vigorously on a regular basis, particularly as the temperature continues warmer. We will refill her meclizine as a trial over the next 10-14 days. If she is still having further orthostatic symptoms, she will notify me and we will discuss her case with Cardiology.   Otherwise we will plan to see her in 6 months. Again, she had been reviewed by chest x-ray last visit, 05/13/2015, showing no new abnormalities. Now with increasing pain, however, we request a CT of the chest for more definitive assessment. This will be d one in the next 2-3 weeks, seeing her just after it is completed. She will continue her current medications for pain as needed.   The patient underwent repeat CT of chest and abdomen fortunately showing no evidence of recurrent disease. As she is seen bac k today she is fact feels better per her chest but is having low back pain possibly related to her chronic and repetitive motion in lifting boxes at work.  The patient reviewed her orthopedics and incidentally undergone a chest x-ray also available from late March.  This is negative for any evidence recurrence at least in the chest and additional exams do not show evidence of bony metastasis.  Ms. agreed that she be seen by pain management and reviewed here in approximately 6 months.     As she is now seen November 4 she is improving with pain management and physical therapy.  We'll continue to see her every " 6 months.  We had a longer discussion today about smoking cessation which she absolutely must do and is willing to try.  Plan NicoDerm patch trial which will be e- scribed to her pharmacy as well as continued meclizine for when necessary use.    Patient is next reviewed April 21.  She has continued smoking cessation is down to 1 pack per week hopes to discontinue altogether within the next month.  She has recently  and is very happy about this.  We have discussed continued NicoDerm patch, use of zoster vaccine (I feel the patient's a  candidate), follow-up assessments with primary care and ophthalmology.  We plan to see her on yearly basis with repeat CBC, CMP and chest x-ray  Patient is next seen May 23, 2018.  She is clinically stable without evidence recurrent disease planning and see her in yearly basis.                  Cc:  Kelton Gerard MD

## 2019-06-21 NOTE — PROGRESS NOTES
Subjective .  Weight gain, continued shortness of breath    REASONS FOR FOLLOWUP:    1. Nonsmall cell lung carcinoma, stage IIA (down staged from presumed stage IIIA). The patient received neoadjuvant chemotherapy with carboplatin/Alimta x2. Followup scans showed considerable response.   2. The patient is status post right upper lobectomy and lymphadenectomy (discharged 03/07/2012).   3. Per Thoracic Conference, additional two cycles of adjuvant carboplatin/Alimta was advised and initiated on 04/11/2012.   4. Patient seen 5/1 and then again 5/21/2012, side effects slowly resolving per carboplatin/Alimta chemotherapy.   5. ? Increasing claudication symptoms left lower extremity, vascular assessment planned.   6. Evaluation per vascular surgery negative, except for bilateral distal carotid stenosis, chest x-ray on 8/27/2012 without new abnormality.   7. Triage visit for evaluation of nail fungus.   8. Patient seen on 2/25/13 with recent chest x-ray from January 2013 with no new findings, evidence of sinopulmonary infection; short course of Augmentin initiated.   9. Patient seen on 7/15 with weight loss noted (?), followup scans to be scheduled. Port maintenance continued.   10. Followup scans were negative for evidence of recurrent disease, weight loss improving. Port to be removed thereafter.   11. The patient was seen on 01/08/2013 with increasing right lower extremity pain, brief interval to prolonged, followup scans scheduled in advance.   12. Follow-up repeat CT scan of chest, abdomen, and pelvis without evidence of recurrent disease, except for enlarging right ovarian/adrenal cystic nodule, ? ovarian cyst, further weight loss noted, plans for repeat thyroid function test analysis, OB/GYN assessment, and initiate nutritional assessment.   13. The patient was seen 05/06/14 with further weight loss (?), assessments to date negative, pulmonary assessment of presumptive COPD.   14. The patient was 08/26/2014,  moderate COPD, chronic obstructive pulmonary disease recognized. Long-term steroid treatment plan, orthopedic assessment for right shoulder planned.   15. The patient is status post orthopedic assessment and injection therapy?   16. The patient was seen on 05/13/2015 - recent diagnosis of vertigo, responsive to meclizine, every 6 month followup planned.   17. Patient seen 11/18/2015 with increasing right upper extremity pain, ?, swelling. Follow up CT of the chest planned.    18. Patient assesse d by repeat CT chest, abdomen, and pelvis found to be negative for evidence of recurrent disease,? additional low back assessment ongoing.   19. Patient reviewed November 04, 2016 stable, undergoing therapy and pain management  20. Patient reviewed April 21, 2017, chest x-ray without new abnormality, smoking cessation ongoing-down to 1 pack per week  21. Patient reviewed May 23, 2018, neck and back pain reviewed by orthopedics  22. Follow-up appointment June 26, 2019, continued shortness of breath, weight gain, low-dose chest CT plan    History of Present Illness       This 59-year-old  female with a history of non-small cell lung carcino ma, having completed both neoadjuvant and then adjuvant chemotherapy by May 2012. She has returned back for routine assessment and Mediport flushes and fortunately has done well up to this point. She did unfortunately recently have evidence of respirator y infection, which has found very difficult to clear and having increasing sinus symptoms. The record does indicate an assessment on 1/14/13 with Dr. Amaro. He found no evidence of recurrent disease. This included a chest x-ray, PA and lateral done on 1 / 14/13 showing good aeration of bilateral lung fields with no infiltrates, effusion, masses or nodules seen. Again, as the patient returns on 2/25/13 she has been having a respiratory infection that was treated thereafter. She was asked to followup and i s now seen on  7/15/13. She had repeat chest x-ray in May 2013 without abnormality.   When seen 07/15/13 she indicated she lost additional weight since last seen and she has been slowly losing weight. She states she feels well with no additional issues, but wonders why she is losing weight and of course is of some concern.   We had followup scans performed 08/06/13 that fortunately show no evidence of neck lymphadenopathy though shotty nodes remain; in the chest there are no suspicious pulmonary opacities or nodules, no pericardial effusion, and resolution of the previously seen pleural thickening in the lateral aspect of the right middle lobe. No change in node in the right hilum with the node measuring 1.3 x 0.8, no mediastinal lymphadenopathy. In the a bdomen there were no suspicious liver lesions. There continue to be two small uterine fibroids, unchanged, and a slight interval increase in a 2.9 cm right ovarian cyst, previously 2.5 cm. The patient has had no clear abnormalities seen on scans. She s tates she is now currently working on a job that unfortunately is not well ventilated, where there is considerable dust. She has had sinus symptoms as a result of this.   The patient thereafter did quite well in fact returned to different job which she is currently is still pursuing. She is active, however, doing quite a bit of labor. She did [place] shopping and had pain in the right lower extremity with a degree of numbness also recognized. Upon rest this did improve, but it is bothering her as to why it ever developed and also the fact that she has been losing weight without any effort. Fortunately this has not been extreme, but is also concerning.   The patient thereafter was asked to undergo repeat studies and underwent repeat CT scan of chest, abdo men, and pelvis on 01/29/2014. These are fortunately without any evidence of recurrent disease. There is a noncalcified 3 mm, stable pulmonary nodule, stable pleural thickening,  and retraction of right apex, stable 13 x 8 mm right hilar lymph node, findin g s per abdomen and pelvis with a stable 12 mm left adrenal nodule thought to be adenomatous, right renal cortical cyst, retrocrural lymph node that is approximately the same as it was previous, right ovarian/adnexal cystic nodule that is increased in size f rom 2.9 to 3.6. The patient does have some pelvic pain on occasion that may be related to the right ovarian cystic nodule. Otherwise, she feels reasonably good, though she is not sleeping particularly well, only 3 or 4 hours per night. Her appetite is goo d, i.e., stable, 2 meals a day. She is working routinely at a physical job and though once again notes further weight loss since last being.   The patient thereafter was asked return for followup. When seen 5/6 she states that her job has become a bit more physically taxing and she has lost additional weight since last seen, and I do find that her weight is 107.8 pounds compared to 111.6 on 2/5. She is not having pain but has noted increasing shortness of breath with moderate exertion. Her appetite remains good overall. She has evidently not seen OB/GYN at this point.   The patient was referred to pulmonary medicine for their assessment. It was felt she had moderate COPD, had her start ProAir and Symbicort and advised to quit smoking. The patient has been t rying to do the latter with the use of any cigarettes and has made some progress. She has lost, however, a moderate degree of additional weight and has been having some right shoulder pain seen upon repetitive use.   The patient went on to see orthopedics, particularly Dr. Healy. His notes from 09/22/2014 are consistent with moderate degenerative disk disease at C6-C7 with loss of intervertebral height with some osteophytic osteophyte formation. He felt she had a potential radiculopathy as well as right carpal tunnel syndrome. She had improved somewhat on steroids. Additional  considerations were cervical traction and/or additional diagnostic injections.   The patient thereafter states that as she is tapered off steroids used to stimulate her performance status - her pain has unfortunately become worse, but not to the degree previously that she had been experiencing. She indicates that she is considering going back to Dr. Healy for the injections that he had been previously offering.   The patient was asked to be seen again at 6 month followup, seen 05/13/2015. She relates a recent episode of apparent vertigo that was responsive to meclizine. It has not recurred with this medication.   The patient thereafter returns now for review 11/18/2015. 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 She is beginning to have some pain and swelling of her right upper extremity and wonders “exactly what this means” . The swelling increases during the workday into the night and then reduces by the a.m. but “never back to normal”. She also continues to use meclizine for vertigo with this medication helping her particularly at night.   The patient was asked to undergo general reevaluation via CT scanning. This was performed 12/01/2015, demonstrated no evidence of metastatic disease in chest, abdomen or pelvis. As the patient is seen it is further noted that she has no obvious bony abnormalities. She states that through her primary care she is being assessed wi th a lumbar spine MRI since she developed further back pain on top of already noted neck pain. This could well be work related and she plans to see him orthopedist Dr. Healy in the next several weeks as well. He had seen her last fall for neck pain.   The patient's been seeing orthopedics including Dr. Camara.  She is evidently not a surgical candidate and has been referred for pain management with a point of this week already planned.  She states incidentally that her dizziness  has improved with the current meclizine doses     The patient is now reviewed back November 4.  In the interval she's been seen by pain management and also physical therapy and undergoes treatment on a regular basis.  This is been mostly successful.  Her pain, however, is in part related to off includes quite a bit of labor.   Patient's now seen back in April 21, 2017.  She is, fortunately, doing well overall pain control general performance status.  She states, happily, that she is  and doing well.  She is moved to smoking cessation and is down to 1 pack per week.     Patient seen back May 23, 2018.  Over the last year she's had continued neck and low back pain now being seen by orthopedics.  She's been advised that additional surgery may be necessary that she hopes not to do this.  Chest x-ray in office today is not changed significantly from previous.  The patient is next seen June 26, 2019 and having increasing shortness of breath as well as significant weight gain having been 115 pounds May 23, 2018 and now 142 pounds June 26, 2019.  We discussed that this may clearly be as result of her weight gain but her history is troubling as well.  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        Past Medical History:   Diagnosis Date   • Asthma    • Bleeding of blood vessel 01/05/2011    BRAIN   • Carpal tunnel syndrome on right    • COPD (chronic obstructive pulmonary disease) (CMS/HCC)     MODERATE   • DDD (degenerative disc disease), cervical 09/22/2014    MODERATE C6-C7, SEEING DR. EWING   • Emphysema of lung (CMS/HCC)    • Fibroids     UTERINE X 2   • Hypertension    • MI (myocardial infarction) (CMS/HCC) 06/2009   • Mitral insufficiency    • Non-small cell carcinoma of lung (CMS/HCC)     Stabe IIA   • Ovarian cyst     RIGHT   • Pain of right upper extremity 11/18/2015   • Pelvic pain    • Right shoulder pain    • SOB (shortness of breath)    •  Thyroid nodule    • Vertigo 05/13/2015   • Weight loss        ONCOLOGIC HISTORY:  (History from previous dates can be found in the separate document.)    Current Outpatient Medications on File Prior to Visit   Medication Sig Dispense Refill   • albuterol sulfate HFA (VENTOLIN HFA) 108 (90 Base) MCG/ACT inhaler Inhale 2 puffs Every 4 (Four) Hours As Needed for Wheezing.     • cyclobenzaprine (FLEXERIL) 10 MG tablet Every 12 (Twelve) Hours.     • gabapentin (NEURONTIN) 100 MG capsule Take 100 mg by mouth Daily.     • HYDROcodone-acetaminophen (NORCO) 7.5-325 MG per tablet Take 1 tablet by mouth As Needed for moderate pain (4-6).     • meclizine (ANTIVERT) 25 MG tablet Take 1 tablet by mouth Every 8 (Eight) Hours As Needed for dizziness. 60 tablet 1   • nicotine (NICODERM CQ) 21 MG/24HR patch Place 1 patch on the skin Daily. 30 patch 2     No current facility-administered medications on file prior to visit.        ALLERGIES:     Allergies   Allergen Reactions   • No Known Drug Allergy        Social History     Socioeconomic History   • Marital status:      Spouse name: Not on file   • Number of children: Not on file   • Years of education: College   • Highest education level: Not on file   Occupational History   • Occupation: [employer]     Employer: [employer]   Tobacco Use   • Smoking status: Current Every Day Smoker     Packs/day: 1.00     Years: 20.00     Pack years: 20.00     Types: Cigarettes   • Smokeless tobacco: Current User   • Tobacco comment: DECREASED ABOUT TO 1/3 PPD ON 4-26-12   Substance and Sexual Activity   • Alcohol use: Yes     Comment: OCCASIONAL   • Drug use: Defer   • Sexual activity: Defer         Cancer-related family history includes Brain cancer in her father; Cancer (age of onset: 47) in her mother.     Review of Systems   Constitutional: Negative for fatigue.   Respiratory: Negative for chest tightness, shortness of breath and wheezing.    Gastrointestinal: Negative for  "abdominal pain, constipation, diarrhea, nausea and vomiting.   Musculoskeletal: Positive for neck pain.   Neurological: Positive for numbness. Negative for weakness.     A comprehensive 14 point review of systems was performed and was negative except as mentioned.    Objective      Vitals:    06/26/19 1403   BP: 135/75   Pulse: 82   Resp: 18   Temp: 98.5 °F (36.9 °C)   TempSrc: Oral   SpO2: 90%   Weight: 64.5 kg (142 lb 1.6 oz)   Height: 160 cm (62.99\")  Comment: new yearly height   PainSc:   8   PainLoc: Comment: neck and shoulder pain     Current Status 6/26/2019   ECOG score 0       Physical Exam    GENERAL: Thin appearing  female alert and oriented.   SKIN: Warm, dry without rashes, purpura or petechiae.   HEAD: Normocephalic.   EYES: Pupils equal, round and reactive to light. EOMs intact. Conjunctivae normal.   EARS: Hearing intact.   NOSE: Septum midline. No excoriations or nasal discharge.   MOUTH: Tongue is well-papillated; no stomatitis or ulcers. Lips normal.   THROAT: Oropharynx without lesions or exudates.   NECK: Supple with good range of motion; no thyromegaly or masses, no JVD or bruits.   LYMPHATICS: No cervical, supraclavicular, axillary or inguinal adenopathy.   CHEST: Lungs clear to percussion and auscultation, prolonged expiratory phase noted bilaterally.   CARDIAC: Regular rate and rhythm without murmurs, rubs or gallops.   ABDOMEN: Soft, nontender with no organomegaly or masses.   EXTREMITIES: No clubbing, cyanosis or edema.   NEUROLOGICAL: No focal neurological deficits.     RECENT LABS:  Hematology WBC   Date Value Ref Range Status   06/26/2019 6.69 3.40 - 10.80 10*3/mm3 Final     RBC   Date Value Ref Range Status   06/26/2019 4.80 3.77 - 5.28 10*6/mm3 Final     Hemoglobin   Date Value Ref Range Status   06/26/2019 13.3 12.0 - 15.9 g/dL Final     Hematocrit   Date Value Ref Range Status   06/26/2019 40.4 34.0 - 46.6 % Final     Platelets   Date Value Ref Range Status " "  06/26/2019 527 140 - 679 10*3/mm3 Final        Assessment/Plan          A 58-year-old female with history of nonsmall cell lung carcinoma, stag e IIIA versus stage IIIB. She underwent neoadjuvant chemotherapy with excellent response, dropping to a pathologic stage II after surgical resection. She took additional courses of adjuvant carboplatin and Alimta in May of 2012. One year later, she had a d egree of weight loss that did not appear to be related to disease progression. This stabilized, though she had a degree of dizziness and subsequent scans of the brain were negative, ? inner ear issues. This did overall improve. Her weight loss, however, c ontinued and we have had her to undergo repeat scans to be certain whether this was not related to recurrent disease. We did not find recurrence of malignancy either on physical exam or scan. Metabolic studies when last seen included normal thyroid fun c tion, normal serum chemistries, normal renal function, normal LFTs, CEA 5.2. At this point it is uncertain why she has weight loss. She had subsequent pulmonary assessment, however, consistent with chronic obstructive pulmonary disease and when see 08/2 6 /2014, a trial of low-dose steroids were initiated, which were helpful. Orthopedically, she has been reviewed as above through Dr. Healy and she does continue to see him periodically. Now when reviewed on 05/13/2015, she has recently had vertigo. In fa ct, she describes on re-exam today, orthostasis. She has adjusted medications to the point that she \"takes hardly anything.\"   We will plan for her to hydrate vigorously on a regular basis, particularly as the temperature continues warmer. We will refill her meclizine as a trial over the next 10-14 days. If she is still having further orthostatic symptoms, she will notify me and we will discuss her case with Cardiology.   Otherwise we will plan to see her in 6 months. Again, she had been reviewed by chest x-ray last " visit, 05/13/2015, showing no new abnormalities. Now with increasing pain, however, we request a CT of the chest for more definitive assessment. This will be d one in the next 2-3 weeks, seeing her just after it is completed. She will continue her current medications for pain as needed.   The patient underwent repeat CT of chest and abdomen fortunately showing no evidence of recurrent disease. As she is seen bac k today she is fact feels better per her chest but is having low back pain possibly related to her chronic and repetitive motion in lifting boxes at [facility] patient reviewed her orthopedics and incidentally undergone a chest x-ray also available from late March.  This is negative for any evidence recurrence at least in the chest and additional exams do not show evidence of bony metastasis.  Ms. agreed that she be seen by pain management and reviewed here in approximately 6 months.     As she is now seen November 4 she is improving with pain management and physical therapy.  We'll continue to see her every 6 months.  We had a longer discussion today about smoking cessation which she absolutely must do and is willing to try.  Plan NicoDerm patch trial which will be e- scribed to her pharmacy as well as continued meclizine for when necessary use.    Patient is next reviewed April 21.  She has continued smoking cessation is down to 1 pack per week hopes to discontinue altogether within the next month.  She has recently  and is very happy about this.  We have discussed continued NicoDerm patch, use of zoster vaccine (I feel the patient's a  candidate), follow-up assessments with primary care and ophthalmology.  We plan to see her on yearly basis with repeat CBC, CMP and chest x-ray  Patient is next seen May 23, 2018.  She is clinically stable without evidence recurrent disease planning and see her in yearly basis.    The patient is next seen June 26, 2019 now approximately 7 years out from her initial  diagnosis.  She does continue to smoke though she is using Chantix which I have urged her to complete.  She is now having increasing shortness of breath but is also gained 25+ pounds since last year.  She is deconditioned from her usual state even though she is working a similar job.  She admits that she is not getting consistent exercise.  An attempt to screen her successfully we plan:  *Low-dose chest CT  *Chantix again encouraged to continue and complete course  *Follow-up pulmonary assessment noted plan  *MD follow-up in 3 months                  Cc:  Kelton Gerard MD

## 2019-06-26 ENCOUNTER — LAB (OUTPATIENT)
Dept: LAB | Facility: HOSPITAL | Age: 59
End: 2019-06-26

## 2019-06-26 ENCOUNTER — OFFICE VISIT (OUTPATIENT)
Dept: ONCOLOGY | Facility: CLINIC | Age: 59
End: 2019-06-26

## 2019-06-26 VITALS
WEIGHT: 142.1 LBS | SYSTOLIC BLOOD PRESSURE: 135 MMHG | OXYGEN SATURATION: 90 % | HEART RATE: 82 BPM | HEIGHT: 63 IN | TEMPERATURE: 98.5 F | RESPIRATION RATE: 18 BRPM | BODY MASS INDEX: 25.18 KG/M2 | DIASTOLIC BLOOD PRESSURE: 75 MMHG

## 2019-06-26 DIAGNOSIS — C34.11 MALIGNANT NEOPLASM OF UPPER LOBE OF RIGHT LUNG (HCC): Primary | ICD-10-CM

## 2019-06-26 DIAGNOSIS — J44.9 CHRONIC OBSTRUCTIVE PULMONARY DISEASE, UNSPECIFIED COPD TYPE (HCC): ICD-10-CM

## 2019-06-26 LAB
ALBUMIN SERPL-MCNC: 4.3 G/DL (ref 3.5–5.2)
ALBUMIN/GLOB SERPL: 1.2 G/DL (ref 1.1–2.4)
ALP SERPL-CCNC: 86 U/L (ref 38–116)
ALT SERPL W P-5'-P-CCNC: 26 U/L (ref 0–33)
ANION GAP SERPL CALCULATED.3IONS-SCNC: 9.7 MMOL/L (ref 5–15)
AST SERPL-CCNC: 27 U/L (ref 0–32)
BASOPHILS # BLD AUTO: 0.04 10*3/MM3 (ref 0–0.2)
BASOPHILS NFR BLD AUTO: 0.6 % (ref 0–1.5)
BILIRUB SERPL-MCNC: 0.3 MG/DL (ref 0.2–1.2)
BUN BLD-MCNC: 11 MG/DL (ref 6–20)
BUN/CREAT SERPL: 11.1 (ref 7.3–30)
CALCIUM SPEC-SCNC: 9.6 MG/DL (ref 8.5–10.2)
CHLORIDE SERPL-SCNC: 106 MMOL/L (ref 98–107)
CO2 SERPL-SCNC: 27.3 MMOL/L (ref 22–29)
CREAT BLD-MCNC: 0.99 MG/DL (ref 0.6–1.1)
DEPRECATED RDW RBC AUTO: 41.3 FL (ref 37–54)
EOSINOPHIL # BLD AUTO: 0.27 10*3/MM3 (ref 0–0.4)
EOSINOPHIL NFR BLD AUTO: 4 % (ref 0.3–6.2)
ERYTHROCYTE [DISTWIDTH] IN BLOOD BY AUTOMATED COUNT: 13.4 % (ref 12.3–15.4)
GFR SERPL CREATININE-BSD FRML MDRD: 70 ML/MIN/1.73
GLOBULIN UR ELPH-MCNC: 3.7 GM/DL (ref 1.8–3.5)
GLUCOSE BLD-MCNC: 101 MG/DL (ref 74–124)
HCT VFR BLD AUTO: 40.4 % (ref 34–46.6)
HGB BLD-MCNC: 13.3 G/DL (ref 12–15.9)
IMM GRANULOCYTES # BLD AUTO: 0.01 10*3/MM3 (ref 0–0.05)
IMM GRANULOCYTES NFR BLD AUTO: 0.1 % (ref 0–0.5)
LYMPHOCYTES # BLD AUTO: 3.31 10*3/MM3 (ref 0.7–3.1)
LYMPHOCYTES NFR BLD AUTO: 49.5 % (ref 19.6–45.3)
MCH RBC QN AUTO: 27.7 PG (ref 26.6–33)
MCHC RBC AUTO-ENTMCNC: 32.9 G/DL (ref 31.5–35.7)
MCV RBC AUTO: 84.2 FL (ref 79–97)
MONOCYTES # BLD AUTO: 0.42 10*3/MM3 (ref 0.1–0.9)
MONOCYTES NFR BLD AUTO: 6.3 % (ref 5–12)
NEUTROPHILS # BLD AUTO: 2.64 10*3/MM3 (ref 1.7–7)
NEUTROPHILS NFR BLD AUTO: 39.5 % (ref 42.7–76)
NRBC BLD AUTO-RTO: 0 /100 WBC (ref 0–0.2)
PLATELET # BLD AUTO: 272 10*3/MM3 (ref 140–450)
PMV BLD AUTO: 8.8 FL (ref 6–12)
POTASSIUM BLD-SCNC: 3.9 MMOL/L (ref 3.5–4.7)
PROT SERPL-MCNC: 8 G/DL (ref 6.3–8)
RBC # BLD AUTO: 4.8 10*6/MM3 (ref 3.77–5.28)
SODIUM BLD-SCNC: 143 MMOL/L (ref 134–145)
WBC NRBC COR # BLD: 6.69 10*3/MM3 (ref 3.4–10.8)

## 2019-06-26 PROCEDURE — 36415 COLL VENOUS BLD VENIPUNCTURE: CPT | Performed by: INTERNAL MEDICINE

## 2019-06-26 PROCEDURE — 85025 COMPLETE CBC W/AUTO DIFF WBC: CPT | Performed by: INTERNAL MEDICINE

## 2019-06-26 PROCEDURE — 80053 COMPREHEN METABOLIC PANEL: CPT | Performed by: INTERNAL MEDICINE

## 2019-06-26 PROCEDURE — 99214 OFFICE O/P EST MOD 30 MIN: CPT | Performed by: INTERNAL MEDICINE

## 2019-06-26 RX ORDER — ALBUTEROL SULFATE 90 UG/1
2 AEROSOL, METERED RESPIRATORY (INHALATION) EVERY 4 HOURS PRN
COMMUNITY
End: 2021-03-26 | Stop reason: SDUPTHER

## 2019-06-26 RX ORDER — CYCLOBENZAPRINE HCL 10 MG
TABLET ORAL EVERY 12 HOURS SCHEDULED
COMMUNITY
End: 2021-03-26

## 2019-06-27 ENCOUNTER — TELEPHONE (OUTPATIENT)
Dept: GENERAL RADIOLOGY | Facility: HOSPITAL | Age: 59
End: 2019-06-27

## 2019-06-27 NOTE — TELEPHONE ENCOUNTER
----- Message from Ada Johnson sent at 6/27/2019  7:41 AM EDT -----  Regarding: CT  Gabriella,         Keep yourself a note to look and make sure [facility] schedules her CT.    Ada

## 2019-07-02 ENCOUNTER — APPOINTMENT (OUTPATIENT)
Dept: PET IMAGING | Facility: HOSPITAL | Age: 59
End: 2019-07-02

## 2019-07-08 ENCOUNTER — HOSPITAL ENCOUNTER (OUTPATIENT)
Dept: PET IMAGING | Facility: HOSPITAL | Age: 59
Discharge: HOME | End: 2019-07-08
Admitting: INTERNAL MEDICINE

## 2019-07-08 DIAGNOSIS — C34.11 MALIGNANT NEOPLASM OF UPPER LOBE OF RIGHT LUNG (HCC): ICD-10-CM

## 2019-07-08 PROCEDURE — G0297 LDCT FOR LUNG CA SCREEN: HCPCS

## 2019-07-11 ENCOUNTER — DOCUMENTATION (OUTPATIENT)
Dept: ONCOLOGY | Facility: CLINIC | Age: 59
End: 2019-07-11

## 2019-07-11 DIAGNOSIS — R91.1 PULMONARY NODULE, LEFT: Primary | ICD-10-CM

## 2019-07-11 NOTE — PROGRESS NOTES
I reviewed the patient's screening low-dose CT scanning with the finding of a left lower lobe pulmonary nodule identified.  This would warrant assessment with PET/CT and a visit to the multidisciplinary lung clinic.  I called the patient concerning this advising her as to how we would proceed with both the radiologic testing and the clinic visit and she agrees.  Plan to proceed.

## 2019-07-19 ENCOUNTER — APPOINTMENT (OUTPATIENT)
Dept: PET IMAGING | Facility: HOSPITAL | Age: 59
End: 2019-07-19

## 2019-07-24 ENCOUNTER — HOSPITAL ENCOUNTER (OUTPATIENT)
Dept: PET IMAGING | Facility: HOSPITAL | Age: 59
Discharge: HOME | End: 2019-07-24
Admitting: INTERNAL MEDICINE

## 2019-07-24 ENCOUNTER — HOSPITAL ENCOUNTER (OUTPATIENT)
Dept: PET IMAGING | Facility: HOSPITAL | Age: 59
Discharge: HOME | End: 2019-07-24

## 2019-07-24 DIAGNOSIS — R91.1 PULMONARY NODULE, LEFT: ICD-10-CM

## 2019-07-24 LAB — GLUCOSE BLDC GLUCOMTR-MCNC: 89 MG/DL (ref 70–130)

## 2019-07-24 PROCEDURE — A9552 F18 FDG: HCPCS | Performed by: INTERNAL MEDICINE

## 2019-07-24 PROCEDURE — 78815 PET IMAGE W/CT SKULL-THIGH: CPT

## 2019-07-24 PROCEDURE — 0 FLUDEOXYGLUCOSE F18 SOLUTION: Performed by: INTERNAL MEDICINE

## 2019-07-24 PROCEDURE — 82962 GLUCOSE BLOOD TEST: CPT

## 2019-07-24 RX ADMIN — FLUDEOXYGLUCOSE F18 1 DOSE: 300 INJECTION INTRAVENOUS at 13:15

## 2019-07-25 ENCOUNTER — HOSPITAL ENCOUNTER (OUTPATIENT)
Dept: PET IMAGING | Facility: HOSPITAL | Age: 59
End: 2019-07-25

## 2019-07-25 ENCOUNTER — APPOINTMENT (OUTPATIENT)
Dept: PET IMAGING | Facility: HOSPITAL | Age: 59
End: 2019-07-25

## 2019-07-31 ENCOUNTER — OFFICE VISIT (OUTPATIENT)
Dept: OTHER | Facility: HOSPITAL | Age: 59
End: 2019-07-31

## 2019-07-31 VITALS
TEMPERATURE: 97.2 F | HEART RATE: 67 BPM | SYSTOLIC BLOOD PRESSURE: 116 MMHG | DIASTOLIC BLOOD PRESSURE: 70 MMHG | BODY MASS INDEX: 25.75 KG/M2 | WEIGHT: 145.3 LBS | OXYGEN SATURATION: 98 % | HEIGHT: 63 IN | RESPIRATION RATE: 16 BRPM

## 2019-07-31 DIAGNOSIS — R91.1 LUNG NODULE: Primary | ICD-10-CM

## 2019-07-31 PROCEDURE — 99204 OFFICE O/P NEW MOD 45 MIN: CPT | Performed by: THORACIC SURGERY (CARDIOTHORACIC VASCULAR SURGERY)

## 2019-07-31 PROCEDURE — G0463 HOSPITAL OUTPT CLINIC VISIT: HCPCS

## 2019-08-02 NOTE — PATIENT INSTRUCTIONS
Pt seen by Dr. Victoria and will be scheduled for a CT chest in 3 mos and will follow up in Dr. Victoria's office. Pt instructed to call nurse navigator with any questions or concerns. Pt given contact cards for Dr. Victoria and nurse navigator.

## 2019-09-18 ENCOUNTER — OFFICE VISIT (OUTPATIENT)
Dept: ONCOLOGY | Facility: CLINIC | Age: 59
End: 2019-09-18

## 2019-09-18 ENCOUNTER — LAB (OUTPATIENT)
Dept: LAB | Facility: HOSPITAL | Age: 59
End: 2019-09-18

## 2019-09-18 VITALS
SYSTOLIC BLOOD PRESSURE: 127 MMHG | OXYGEN SATURATION: 100 % | HEIGHT: 63 IN | DIASTOLIC BLOOD PRESSURE: 80 MMHG | RESPIRATION RATE: 16 BRPM | WEIGHT: 137.8 LBS | HEART RATE: 77 BPM | TEMPERATURE: 99.5 F | BODY MASS INDEX: 24.41 KG/M2

## 2019-09-18 DIAGNOSIS — R91.1 PULMONARY NODULE, LEFT: Primary | ICD-10-CM

## 2019-09-18 DIAGNOSIS — R91.1 NODULE OF LEFT LUNG: ICD-10-CM

## 2019-09-18 DIAGNOSIS — C34.11 MALIGNANT NEOPLASM OF UPPER LOBE OF RIGHT LUNG (HCC): Primary | ICD-10-CM

## 2019-09-18 LAB
BASOPHILS # BLD AUTO: 0.05 10*3/MM3 (ref 0–0.2)
BASOPHILS NFR BLD AUTO: 0.7 % (ref 0–1.5)
DEPRECATED RDW RBC AUTO: 39.1 FL (ref 37–54)
EOSINOPHIL # BLD AUTO: 0.28 10*3/MM3 (ref 0–0.4)
EOSINOPHIL NFR BLD AUTO: 4.2 % (ref 0.3–6.2)
ERYTHROCYTE [DISTWIDTH] IN BLOOD BY AUTOMATED COUNT: 13 % (ref 12.3–15.4)
HCT VFR BLD AUTO: 40.7 % (ref 34–46.6)
HGB BLD-MCNC: 14 G/DL (ref 12–15.9)
IMM GRANULOCYTES # BLD AUTO: 0.08 10*3/MM3 (ref 0–0.05)
IMM GRANULOCYTES NFR BLD AUTO: 1.2 % (ref 0–0.5)
LYMPHOCYTES # BLD AUTO: 2.99 10*3/MM3 (ref 0.7–3.1)
LYMPHOCYTES NFR BLD AUTO: 44.8 % (ref 19.6–45.3)
MCH RBC QN AUTO: 28.4 PG (ref 26.6–33)
MCHC RBC AUTO-ENTMCNC: 34.4 G/DL (ref 31.5–35.7)
MCV RBC AUTO: 82.6 FL (ref 79–97)
MONOCYTES # BLD AUTO: 0.57 10*3/MM3 (ref 0.1–0.9)
MONOCYTES NFR BLD AUTO: 8.5 % (ref 5–12)
NEUTROPHILS # BLD AUTO: 2.71 10*3/MM3 (ref 1.7–7)
NEUTROPHILS NFR BLD AUTO: 40.6 % (ref 42.7–76)
NRBC BLD AUTO-RTO: 0 /100 WBC (ref 0–0.2)
PLATELET # BLD AUTO: 253 10*3/MM3 (ref 140–450)
PMV BLD AUTO: 8.6 FL (ref 6–12)
RBC # BLD AUTO: 4.93 10*6/MM3 (ref 3.77–5.28)
WBC NRBC COR # BLD: 6.68 10*3/MM3 (ref 3.4–10.8)

## 2019-09-18 PROCEDURE — 99213 OFFICE O/P EST LOW 20 MIN: CPT | Performed by: NURSE PRACTITIONER

## 2019-09-18 PROCEDURE — G0463 HOSPITAL OUTPT CLINIC VISIT: HCPCS | Performed by: NURSE PRACTITIONER

## 2019-09-18 PROCEDURE — 36415 COLL VENOUS BLD VENIPUNCTURE: CPT | Performed by: NURSE PRACTITIONER

## 2019-09-18 PROCEDURE — 85025 COMPLETE CBC W/AUTO DIFF WBC: CPT | Performed by: NURSE PRACTITIONER

## 2019-09-18 NOTE — PROGRESS NOTES
Subjective .    REASONS FOR FOLLOWUP:    1. Nonsmall cell lung carcinoma, stage IIA (down staged from presumed stage IIIA). The patient received neoadjuvant chemotherapy with carboplatin/Alimta x2. Followup scans showed considerable response. The patient is status post right upper lobectomy and lymphadenectomy (discharged 03/07/2012). Per Thoracic Conference, additional two cycles of adjuvant carboplatin/Alimta was advised and initiated on 04/11/2012. She has since remained in remission.     History of Present Illness    The patient is a 59 y.o. female with the above-mentioned history, who returns the office today for 3-month follow-up.  She remains in observation in regards to her malignancy.  She did undergo surveillance CT scan July 2019 with a left lower lobe nodule, 1.1 cm noted.  She then underwent PET scan 7/24/2019 with no metabolic activity identified.   The patient reports today she overall feels very well.  She does have some chronic shortness of breath on exertion though recovers quickly with rest.  She has been exercising and changed her diet, therefore has lost some weight.  She continues to have a chronic cough with occasional productive clear sputum.  She is attempting to stop smoking, though continues to smoke 1 pack/day.  She was recently prescribed Chantix by pulmonology.  She denies any new pain.    Past Medical History:   Diagnosis Date   • Asthma    • Bleeding of blood vessel 01/05/2011    BRAIN   • Carpal tunnel syndrome on right    • COPD (chronic obstructive pulmonary disease) (CMS/HCC)     MODERATE   • DDD (degenerative disc disease), cervical 09/22/2014    MODERATE C6-C7, SEEING DR. EWING   • Emphysema of lung (CMS/HCC)    • Fibroids     UTERINE X 2   • Hypertension    • MI (myocardial infarction) (CMS/HCC) 06/2009   • Mitral insufficiency    • Non-small cell carcinoma of lung (CMS/HCC)     Stabe IIA   • Ovarian cyst     RIGHT   • Pain of right upper extremity 11/18/2015   • Pelvic  pain    • Right shoulder pain    • SOB (shortness of breath)    • Thyroid nodule    • Vertigo 05/13/2015   • Weight loss        ONCOLOGIC HISTORY:  (History from previous dates can be found in the separate document.)    Current Outpatient Medications on File Prior to Visit   Medication Sig Dispense Refill   • albuterol sulfate HFA (VENTOLIN HFA) 108 (90 Base) MCG/ACT inhaler Inhale 2 puffs Every 4 (Four) Hours As Needed for Wheezing.     • cyclobenzaprine (FLEXERIL) 10 MG tablet Every 12 (Twelve) Hours.     • gabapentin (NEURONTIN) 100 MG capsule Take 100 mg by mouth Daily.     • HYDROcodone-acetaminophen (NORCO) 7.5-325 MG per tablet Take 1 tablet by mouth As Needed for moderate pain (4-6).     • meclizine (ANTIVERT) 25 MG tablet Take 1 tablet by mouth Every 8 (Eight) Hours As Needed for dizziness. 60 tablet 1   • nicotine (NICODERM CQ) 21 MG/24HR patch Place 1 patch on the skin Daily. 30 patch 2     No current facility-administered medications on file prior to visit.        ALLERGIES:     Allergies   Allergen Reactions   • No Known Drug Allergy        Social History     Socioeconomic History   • Marital status:      Spouse name: Not on file   • Number of children: Not on file   • Years of education: College   • Highest education level: Not on file   Occupational History   • Occupation: [employer]     Employer: [employer]   Tobacco Use   • Smoking status: Current Every Day Smoker     Packs/day: 1.00     Years: 20.00     Pack years: 20.00     Types: Cigarettes   • Smokeless tobacco: Current User   • Tobacco comment: DECREASED ABOUT TO 1/3 PPD ON 4-26-12   Substance and Sexual Activity   • Alcohol use: Yes     Comment: OCCASIONAL   • Drug use: Defer   • Sexual activity: Defer         Cancer-related family history includes Brain cancer in her father; Cancer (age of onset: 47) in her mother.     I have reviewed the patient's medical history in detail and updated the computerized patient record.    Review  "of Systems   Constitutional: Negative for chills, fatigue and fever.   HENT: Negative for mouth sores and sore throat.    Eyes: Negative for visual disturbance.   Respiratory: Positive for shortness of breath (chronic unchanged). Negative for cough, chest tightness and wheezing.    Cardiovascular: Negative for chest pain and leg swelling.   Gastrointestinal: Negative for abdominal pain, constipation, diarrhea, nausea and vomiting.   Genitourinary: Negative for dysuria and frequency.   Musculoskeletal: Positive for back pain. Negative for neck pain.   Neurological: Negative for dizziness, weakness and numbness.   Hematological: Does not bruise/bleed easily.   Psychiatric/Behavioral: The patient is not nervous/anxious.    All other systems reviewed and are negative.  Review of systems 09/18/2019  unchanged from previous office visit except as updated.       Objective      Vitals:    09/18/19 1250   BP: 127/80   Pulse: 77   Resp: 16   Temp: 99.5 °F (37.5 °C)   SpO2: 100%   Weight: 62.5 kg (137 lb 12.8 oz)   Height: 160 cm (62.99\")   PainSc:   1   PainLoc: Back     Current Status 9/18/2019   ECOG score 0       Physical Exam    GENERAL:  female alert and oriented.   SKIN: Warm, dry without rashes, purpura or petechiae.   HEAD: Normocephalic.   EYES: Pupils equal, round and reactive to light. EOMs intact. Conjunctivae normal.   EARS: Hearing intact.   NOSE: Septum midline. No excoriations or nasal discharge.   MOUTH: Tongue is well-papillated; no stomatitis or ulcers. Lips normal.   THROAT: Oropharynx without lesions or exudates.   NECK: Supple with good range of motion; no thyromegaly or masses, no JVD or bruits.   LYMPHATICS: No cervical, supraclavicular adenopathy.   CHEST: Lungs clear to auscultation, prolonged expiratory phase noted bilaterally.   CARDIAC: Regular rate and rhythm without murmurs, rubs or gallops. Bowel sounds present.  ABDOMEN: Soft, nontender with no organomegaly or masses. "   EXTREMITIES: No clubbing, cyanosis or edema.   NEUROLOGICAL: No focal neurological deficits.   Physical exam 09/18/2019 unchanged from previous office visit except as updated.      RECENT LABS:  Results from last 7 days   Lab Units 09/18/19  1239   WBC 10*3/mm3 6.68   NEUTROS ABS 10*3/mm3 2.71   HEMOGLOBIN g/dL 14.0   HEMATOCRIT % 40.7   PLATELETS 10*3/mm3 253             IMAGING:  F-18 FDG PET FROM SKULL BASE TO MID THIGH WITH PET/CT FUSION     HISTORY: 59-year-old female with a pulmonary nodule.     TECHNIQUE: Radiation dose reduction techniques were utilized, including  automated exposure control and exposure modulation based on body size.   Blood glucose level at time of injection was 89 mg/dL.  6.5 mCi of F-18  FDG were injected and PET was performed from skull base to mid thigh. CT  was obtained for localization and attenuation correction. Time at  injection 1:15 PM. PET start time 2:35 PM. Compared with low-dose chest  CT from 07/08/2019 and PET/CT from 01/24/2012.     FINDINGS: The 1.1 cm pleural-based left lower lobe pulmonary nodule is  photopenic. There is no hypermetabolic lymphadenopathy or suspicious  hypermetabolic activity within the chest. There is no suspicious  hypermetabolic activity within the neck. There is no suspicious  hypermetabolic activity within the abdomen or pelvis. Specifically,  there is no suspicious liver or adrenal activity. There is scattered  bowel activity and there is no definite bowel thickening on the CT  sequence.     IMPRESSION:  The 1.1 cm left lower lobe pulmonary nodule is photopenic.  Conservative surveillance is recommended with a follow-up chest CT in 3  months.    Assessment/Plan      1. Nonsmall cell lung carcinoma, stage IIA (down staged from presumed stage IIIA).  Staging MRI of the brain negative. The patient received neoadjuvant chemotherapy with carboplatin/Alimta x2. Followup scans showed considerable response. The patient is status post right upper  lobectomy and lymphadenectomy (discharged 03/07/2012). Per Thoracic Conference, additional two cycles of adjuvant carboplatin/Alimta was advised and initiated on 04/11/2012. She has since remained in remission.    Low-dose screening CT of the chest July 2018 with a 1.1 left lower lobe nodule noted.  Follow-up PET scan the nodule was photopenic. She will remain in remission.     2.  COPD.  Currently without symptoms of exacerbation.  She does see pulmonology.    3.  Smoking cessation.  This was once again discussed today.  She reports she was recently again prescribed Chantix.    4. Chronic pain. The patient is seen and managed by pain management.     PLAN:  1. Follow-up with Dr. Burns in 3 months  2. CT of the chest has already been ordered by thoracic surgery for 3-month follow-up November 2019.  3. We once again discussed smoking cessation today.    Juana Vasquez, APRN  09/18/2019        Cc:  Kelton Gerard MD

## 2019-11-11 ENCOUNTER — TELEPHONE (OUTPATIENT)
Dept: SURGERY | Facility: CLINIC | Age: 59
End: 2019-11-11

## 2019-12-05 NOTE — PROGRESS NOTES
Subjective .  Weight gain, continued shortness of breath    REASONS FOR FOLLOWUP:    1. Nonsmall cell lung carcinoma, stage IIA (down staged from presumed stage IIIA). The patient received neoadjuvant chemotherapy with carboplatin/Alimta x2. Followup scans showed considerable response.   2. The patient is status post right upper lobectomy and lymphadenectomy (discharged 03/07/2012).   3. Per Thoracic Conference, additional two cycles of adjuvant carboplatin/Alimta was advised and initiated on 04/11/2012.   4. Patient seen 5/1 and then again 5/21/2012, side effects slowly resolving per carboplatin/Alimta chemotherapy.   5. ? Increasing claudication symptoms left lower extremity, vascular assessment planned.   6. Evaluation per vascular surgery negative, except for bilateral distal carotid stenosis, chest x-ray on 8/27/2012 without new abnormality.   7. Triage visit for evaluation of nail fungus.   8. Patient seen on 2/25/13 with recent chest x-ray from January 2013 with no new findings, evidence of sinopulmonary infection; short course of Augmentin initiated.   9. Patient seen on 7/15 with weight loss noted (?), followup scans to be scheduled. Port maintenance continued.   10. Followup scans were negative for evidence of recurrent disease, weight loss improving. Port to be removed thereafter.   11. The patient was seen on 01/08/2013 with increasing right lower extremity pain, brief interval to prolonged, followup scans scheduled in advance.   12. Follow-up repeat CT scan of chest, abdomen, and pelvis without evidence of recurrent disease, except for enlarging right ovarian/adrenal cystic nodule, ? ovarian cyst, further weight loss noted, plans for repeat thyroid function test analysis, OB/GYN assessment, and initiate nutritional assessment.   13. The patient was seen 05/06/14 with further weight loss (?), assessments to date negative, pulmonary assessment of presumptive COPD.   14. The patient was 08/26/2014,  moderate COPD, chronic obstructive pulmonary disease recognized. Long-term steroid treatment plan, orthopedic assessment for right shoulder planned.   15. The patient is status post orthopedic assessment and injection therapy?   16. The patient was seen on 05/13/2015 – recent diagnosis of vertigo, responsive to meclizine, every 6 month followup planned.   17. Patient seen 11/18/2015 with increasing right upper extremity pain, ?, swelling. Follow up CT of the chest planned.    18. Patient assesse d by repeat CT chest, abdomen, and pelvis found to be negative for evidence of recurrent disease,? additional low back assessment ongoing.   19. Patient reviewed November 04, 2016 stable, undergoing therapy and pain management  20. Patient reviewed April 21, 2017, chest x-ray without new abnormality, smoking cessation ongoing–down to 1 pack per week  21. Patient reviewed May 23, 2018, neck and back pain reviewed by orthopedics  22. Follow-up appointment June 26, 2019, continued shortness of breath, weight gain, low-dose chest CT plan    History of Present Illness       This 59-year-old  female with a history of non-small cell lung carcino ma, having completed both neoadjuvant and then adjuvant chemotherapy by May 2012. She has returned back for routine assessment and Mediport flushes and fortunately has done well up to this point. She did unfortunately recently have evidence of respirator y infection, which has found very difficult to clear and having increasing sinus symptoms. The record does indicate an assessment on 1/14/13 with Dr. Amaro. He found no evidence of recurrent disease. This included a chest x-ray, PA and lateral done on 1 / 14/13 showing good aeration of bilateral lung fields with no infiltrates, effusion, masses or nodules seen. Again, as the patient returns on 2/25/13 she has been having a respiratory infection that was treated thereafter. She was asked to followup and i s now seen on  7/15/13. She had repeat chest x-ray in May 2013 without abnormality.   When seen 07/15/13 she indicated she lost additional weight since last seen and she has been slowly losing weight. She states she feels well with no additional issues, but wonders why she is losing weight and of course is of some concern.   We had followup scans performed 08/06/13 that fortunately show no evidence of neck lymphadenopathy though shotty nodes remain; in the chest there are no suspicious pulmonary opacities or nodules, no pericardial effusion, and resolution of the previously seen pleural thickening in the lateral aspect of the right middle lobe. No change in node in the right hilum with the node measuring 1.3 x 0.8, no mediastinal lymphadenopathy. In the a bdomen there were no suspicious liver lesions. There continue to be two small uterine fibroids, unchanged, and a slight interval increase in a 2.9 cm right ovarian cyst, previously 2.5 cm. The patient has had no clear abnormalities seen on scans. She s tates she is now currently working on a job that unfortunately is not well ventilated, where there is considerable dust. She has had sinus symptoms as a result of this.   The patient thereafter did quite well in fact returned to different job which she is currently is still pursuing. She is active, however, doing quite a bit of labor. She did [place] shopping and had pain in the right lower extremity with a degree of numbness also recognized. Upon rest this did improve, but it is bothering her as to why it ever developed and also the fact that she has been losing weight without any effort. Fortunately this has not been extreme, but is also concerning.   The patient thereafter was asked to undergo repeat studies and underwent repeat CT scan of chest, abdo men, and pelvis on 01/29/2014. These are fortunately without any evidence of recurrent disease. There is a noncalcified 3 mm, stable pulmonary nodule, stable pleural thickening,  and retraction of right apex, stable 13 x 8 mm right hilar lymph node, findin g s per abdomen and pelvis with a stable 12 mm left adrenal nodule thought to be adenomatous, right renal cortical cyst, retrocrural lymph node that is approximately the same as it was previous, right ovarian/adnexal cystic nodule that is increased in size f rom 2.9 to 3.6. The patient does have some pelvic pain on occasion that may be related to the right ovarian cystic nodule. Otherwise, she feels reasonably good, though she is not sleeping particularly well, only 3 or 4 hours per night. Her appetite is goo d, i.e., stable, 2 meals a day. She is working routinely at a physical job and though once again notes further weight loss since last being.   The patient thereafter was asked return for followup. When seen 5/6 she states that her job has become a bit more physically taxing and she has lost additional weight since last seen, and I do find that her weight is 107.8 pounds compared to 111.6 on 2/5. She is not having pain but has noted increasing shortness of breath with moderate exertion. Her appetite remains good overall. She has evidently not seen OB/GYN at this point.   The patient was referred to pulmonary medicine for their assessment. It was felt she had moderate COPD, had her start ProAir and Symbicort and advised to quit smoking. The patient has been t rying to do the latter with the use of any cigarettes and has made some progress. She has lost, however, a moderate degree of additional weight and has been having some right shoulder pain seen upon repetitive use.   The patient went on to see orthopedics, particularly Dr. Healy. His notes from 09/22/2014 are consistent with moderate degenerative disk disease at C6-C7 with loss of intervertebral height with some osteophytic osteophyte formation. He felt she had a potential radiculopathy as well as right carpal tunnel syndrome. She had improved somewhat on steroids. Additional  considerations were cervical traction and/or additional diagnostic injections.   The patient thereafter states that as she is tapered off steroids used to stimulate her performance status – her pain has unfortunately become worse, but not to the degree previously that she had been experiencing. She indicates that she is considering going back to Dr. Healy for the injections that he had been previously offering.   The patient was asked to be seen again at 6 month followup, seen 05/13/2015. She relates a recent episode of apparent vertigo that was responsive to meclizine. It has not recurred with this medication.   The patient thereafter returns now for review 11/18/2015. 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 She is beginning to have some pain and swelling of her right upper extremity and wonders “exactly what this means” . The swelling increases during the workday into the night and then reduces by the a.m. but “never back to normal”. She also continues to use meclizine for vertigo with this medication helping her particularly at night.   The patient was asked to undergo general reevaluation via CT scanning. This was performed 12/01/2015, demonstrated no evidence of metastatic disease in chest, abdomen or pelvis. As the patient is seen it is further noted that she has no obvious bony abnormalities. She states that through her primary care she is being assessed wi th a lumbar spine MRI since she developed further back pain on top of already noted neck pain. This could well be work related and she plans to see him orthopedist Dr. Healy in the next several weeks as well. He had seen her last fall for neck pain.   The patient's been seeing orthopedics including Dr. Camara.  She is evidently not a surgical candidate and has been referred for pain management with a point of this week already planned.  She states incidentally that her dizziness  has improved with the current meclizine doses     The patient is now reviewed back November 4.  In the interval she's been seen by pain management and also physical therapy and undergoes treatment on a regular basis.  This is been mostly successful.  Her pain, however, is in part related to off includes quite a bit of labor.   Patient's now seen back in April 21, 2017.  She is, fortunately, doing well overall pain control general performance status.  She states, happily, that she is  and doing well.  She is moved to smoking cessation and is down to 1 pack per week.     Patient seen back May 23, 2018.  Over the last year she's had continued neck and low back pain now being seen by orthopedics.  She's been advised that additional surgery may be necessary that she hopes not to do this.  Chest x-ray in office today is not changed significantly from previous.  The patient is next seen June 26, 2019 and having increasing shortness of breath as well as significant weight gain having been 115 pounds May 23, 2018 and now 142 pounds June 26, 2019.  We discussed that this may clearly be as result of her weight gain but her history is troubling as well.  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        Past Medical History:   Diagnosis Date   • Asthma    • Bleeding of blood vessel 01/05/2011    BRAIN   • Carpal tunnel syndrome on right    • COPD (chronic obstructive pulmonary disease) (CMS/HCC)     MODERATE   • DDD (degenerative disc disease), cervical 09/22/2014    MODERATE C6-C7, SEEING DR. EWING   • Emphysema of lung (CMS/HCC)    • Fibroids     UTERINE X 2   • Hypertension    • MI (myocardial infarction) (CMS/HCC) 06/2009   • Mitral insufficiency    • Non-small cell carcinoma of lung (CMS/HCC)     Stabe IIA   • Ovarian cyst     RIGHT   • Pain of right upper extremity 11/18/2015   • Pelvic pain    • Right shoulder pain    • SOB (shortness of breath)    •  Thyroid nodule    • Vertigo 05/13/2015   • Weight loss        ONCOLOGIC HISTORY:  (History from previous dates can be found in the separate document.)    Current Outpatient Medications on File Prior to Visit   Medication Sig Dispense Refill   • albuterol sulfate HFA (VENTOLIN HFA) 108 (90 Base) MCG/ACT inhaler Inhale 2 puffs Every 4 (Four) Hours As Needed for Wheezing.     • cyclobenzaprine (FLEXERIL) 10 MG tablet Every 12 (Twelve) Hours.     • gabapentin (NEURONTIN) 100 MG capsule Take 100 mg by mouth Daily.     • HYDROcodone-acetaminophen (NORCO) 7.5-325 MG per tablet Take 1 tablet by mouth As Needed for moderate pain (4-6).     • meclizine (ANTIVERT) 25 MG tablet Take 1 tablet by mouth Every 8 (Eight) Hours As Needed for dizziness. 60 tablet 1   • nicotine (NICODERM CQ) 21 MG/24HR patch Place 1 patch on the skin Daily. 30 patch 2     No current facility-administered medications on file prior to visit.        ALLERGIES:     Allergies   Allergen Reactions   • No Known Drug Allergy        Social History     Socioeconomic History   • Marital status:      Spouse name: Not on file   • Number of children: Not on file   • Years of education: College   • Highest education level: Not on file   Occupational History   • Occupation: [employer]     Employer: [employer]   Tobacco Use   • Smoking status: Current Every Day Smoker     Packs/day: 1.00     Years: 20.00     Pack years: 20.00     Types: Cigarettes   • Smokeless tobacco: Current User   • Tobacco comment: DECREASED ABOUT TO 1/3 PPD ON 4-26-12   Substance and Sexual Activity   • Alcohol use: Yes     Comment: OCCASIONAL   • Drug use: Defer   • Sexual activity: Defer         Cancer-related family history includes Brain cancer in her father; Cancer (age of onset: 47) in her mother.     Review of Systems   Constitutional: Negative for fatigue.   Respiratory: Negative for chest tightness, shortness of breath and wheezing.    Gastrointestinal: Negative for  abdominal pain, constipation, diarrhea, nausea and vomiting.   Musculoskeletal: Positive for neck pain.   Neurological: Positive for numbness. Negative for weakness.     A comprehensive 14 point review of systems was performed and was negative except as mentioned.    Objective      There were no vitals filed for this visit.  Current Status 9/18/2019   ECOG score 0       Physical Exam    GENERAL: Thin appearing  female alert and oriented.   SKIN: Warm, dry without rashes, purpura or petechiae.   HEAD: Normocephalic.   EYES: Pupils equal, round and reactive to light. EOMs intact. Conjunctivae normal.   EARS: Hearing intact.   NOSE: Septum midline. No excoriations or nasal discharge.   MOUTH: Tongue is well-papillated; no stomatitis or ulcers. Lips normal.   THROAT: Oropharynx without lesions or exudates.   NECK: Supple with good range of motion; no thyromegaly or masses, no JVD or bruits.   LYMPHATICS: No cervical, supraclavicular, axillary or inguinal adenopathy.   CHEST: Lungs clear to percussion and auscultation, prolonged expiratory phase noted bilaterally.   CARDIAC: Regular rate and rhythm without murmurs, rubs or gallops.   ABDOMEN: Soft, nontender with no organomegaly or masses.   EXTREMITIES: No clubbing, cyanosis or edema.   NEUROLOGICAL: No focal neurological deficits.     RECENT LABS:  Hematology WBC   Date Value Ref Range Status   09/18/2019 6.68 3.40 - 10.80 10*3/mm3 Final     RBC   Date Value Ref Range Status   09/18/2019 4.93 3.77 - 5.28 10*6/mm3 Final     Hemoglobin   Date Value Ref Range Status   09/18/2019 14.0 12.0 - 15.9 g/dL Final     Hematocrit   Date Value Ref Range Status   09/18/2019 40.7 34.0 - 46.6 % Final     Platelets   Date Value Ref Range Status   09/18/2019 253 140 - 450 10*3/mm3 Final      F-18 FDG PET FROM SKULL BASE TO MID THIGH WITH PET/CT FUSION 7/24/19    FINDINGS: The 1.1 cm pleural-based left lower lobe pulmonary nodule is  photopenic. There is no hypermetabolic  lymphadenopathy or suspicious  hypermetabolic activity within the chest. There is no suspicious  hypermetabolic activity within the neck. There is no suspicious  hypermetabolic activity within the abdomen or pelvis. Specifically,  there is no suspicious liver or adrenal activity. There is scattered  bowel activity and there is no definite bowel thickening on the CT  sequence.     IMPRESSION:  The 1.1 cm left lower lobe pulmonary nodule is photopenic.  Conservative surveillance is recommended with a follow-up chest CT in 3  months.     This report was finalized on 7/29/2019 2:56 PM by Dr. Jazzmine Tompkins M.D.         Assessment/Plan          A 58-year-old female with history of nonsmall cell lung carcinoma, stag e IIIA versus stage IIIB. She underwent neoadjuvant chemotherapy with excellent response, dropping to a pathologic stage II after surgical resection. She took additional courses of adjuvant carboplatin and Alimta in May of 2012. One year later, she had a d egree of weight loss that did not appear to be related to disease progression. This stabilized, though she had a degree of dizziness and subsequent scans of the brain were negative, ? inner ear issues. This did overall improve. Her weight loss, however, c ontinued and we have had her to undergo repeat scans to be certain whether this was not related to recurrent disease. We did not find recurrence of malignancy either on physical exam or scan. Metabolic studies when last seen included normal thyroid fun c tion, normal serum chemistries, normal renal function, normal LFTs, CEA 5.2. At this point it is uncertain why she has weight loss. She had subsequent pulmonary assessment, however, consistent with chronic obstructive pulmonary disease and when see 08/2 6 /2014, a trial of low-dose steroids were initiated, which were helpful. Orthopedically, she has been reviewed as above through Dr. Healy and she does continue to see him periodically. Now when reviewed on  "05/13/2015, she has recently had vertigo. In fa ct, she describes on re-exam today, orthostasis. She has adjusted medications to the point that she \"takes hardly anything.\"   We will plan for her to hydrate vigorously on a regular basis, particularly as the temperature continues warmer. We will refill her meclizine as a trial over the next 10-14 days. If she is still having further orthostatic symptoms, she will notify me and we will discuss her case with Cardiology.   Otherwise we will plan to see her in 6 months. Again, she had been reviewed by chest x-ray last visit, 05/13/2015, showing no new abnormalities. Now with increasing pain, however, we request a CT of the chest for more definitive assessment. This will be d one in the next 2-3 weeks, seeing her just after it is completed. She will continue her current medications for pain as needed.   The patient underwent repeat CT of chest and abdomen fortunately showing no evidence of recurrent disease. As she is seen bac k today she is fact feels better per her chest but is having low back pain possibly related to her chronic and repetitive motion in lifting boxes at [facility] patient reviewed her orthopedics and incidentally undergone a chest x-ray also available from late March.  This is negative for any evidence recurrence at least in the chest and additional exams do not show evidence of bony metastasis.  Ms. agreed that she be seen by pain management and reviewed here in approximately 6 months.     As she is now seen November 4 she is improving with pain management and physical therapy.  We'll continue to see her every 6 months.  We had a longer discussion today about smoking cessation which she absolutely must do and is willing to try.  Plan NicoDerm patch trial which will be e- scribed to her pharmacy as well as continued meclizine for when necessary use.    Patient is next reviewed April 21.  She has continued smoking cessation is down to 1 pack per week hopes to " discontinue altogether within the next month.  She has recently  and is very happy about this.  We have discussed continued NicoDerm patch, use of zoster vaccine (I feel the patient's a  candidate), follow-up assessments with primary care and ophthalmology.  We plan to see her on yearly basis with repeat CBC, CMP and chest x-ray  Patient is next seen May 23, 2018.  She is clinically stable without evidence recurrent disease planning and see her in yearly basis.    The patient is next seen June 26, 2019 now approximately 7 years out from her initial diagnosis.  She does continue to smoke though she is using Chantix which I have urged her to complete.  She is now having increasing shortness of breath but is also gained 25+ pounds since last year.  She is deconditioned from her usual state even though she is working a similar job.  She admits that she is not getting consistent exercise.  An attempt to screen her successfully we plan:  *Low-dose chest CT  *Chantix again encouraged to continue and complete course  *Follow-up pulmonary assessment noted plan  *MD follow-up in 3 months                  Cc:  Kelton Gerard MD

## 2019-12-11 ENCOUNTER — APPOINTMENT (OUTPATIENT)
Dept: LAB | Facility: HOSPITAL | Age: 59
End: 2019-12-11

## 2019-12-11 ENCOUNTER — APPOINTMENT (OUTPATIENT)
Dept: ONCOLOGY | Facility: CLINIC | Age: 59
End: 2019-12-11

## 2020-01-02 ENCOUNTER — OFFICE VISIT (OUTPATIENT)
Dept: ONCOLOGY | Facility: CLINIC | Age: 60
End: 2020-01-02

## 2020-01-02 ENCOUNTER — APPOINTMENT (OUTPATIENT)
Dept: LAB | Facility: HOSPITAL | Age: 60
End: 2020-01-02

## 2020-01-02 VITALS
TEMPERATURE: 98.4 F | HEART RATE: 79 BPM | SYSTOLIC BLOOD PRESSURE: 117 MMHG | BODY MASS INDEX: 24.61 KG/M2 | WEIGHT: 138.9 LBS | OXYGEN SATURATION: 97 % | DIASTOLIC BLOOD PRESSURE: 66 MMHG | RESPIRATION RATE: 16 BRPM | HEIGHT: 63 IN

## 2020-01-02 DIAGNOSIS — C34.11 MALIGNANT NEOPLASM OF UPPER LOBE OF RIGHT LUNG (HCC): Primary | ICD-10-CM

## 2020-01-02 DIAGNOSIS — R91.1 PULMONARY NODULE, LEFT: ICD-10-CM

## 2020-01-27 ENCOUNTER — APPOINTMENT (OUTPATIENT)
Dept: CT IMAGING | Facility: HOSPITAL | Age: 60
End: 2020-01-27

## 2020-01-31 ENCOUNTER — DOCUMENTATION (OUTPATIENT)
Dept: ONCOLOGY | Facility: CLINIC | Age: 60
End: 2020-01-31

## 2020-01-31 NOTE — PROGRESS NOTES
CSW mailed the [facility] [place] Financial Assistance application to patient's home per the request of Kevin David.

## 2020-02-04 ENCOUNTER — HOSPITAL ENCOUNTER (OUTPATIENT)
Dept: GENERAL RADIOLOGY | Facility: HOSPITAL | Age: 60
Discharge: HOME | End: 2020-02-04
Admitting: INTERNAL MEDICINE

## 2020-02-04 DIAGNOSIS — R91.1 PULMONARY NODULE, LEFT: ICD-10-CM

## 2020-02-04 DIAGNOSIS — C34.11 MALIGNANT NEOPLASM OF UPPER LOBE OF RIGHT LUNG (HCC): ICD-10-CM

## 2020-02-04 PROCEDURE — 71046 X-RAY EXAM CHEST 2 VIEWS: CPT

## 2020-02-06 NOTE — PROGRESS NOTES
Subjective .  Concerned about lung nodule  REASONS FOR FOLLOWUP:    1. Nonsmall cell lung carcinoma, stage IIA (down staged from presumed stage IIIA). The patient received neoadjuvant chemotherapy with carboplatin/Alimta x2. Followup scans showed considerable response. The patient is status post right upper lobectomy and lymphadenectomy (discharged 03/07/2012). Per Thoracic Conference, additional two cycles of adjuvant carboplatin/Alimta was advised and initiated on 04/11/2012. She has since remained in remission.     History of Present Illness    The patient is a 59 y.o. female with the above-mentioned history, who returns the office today for 3-month follow-up.  She remains in observation in regards to her malignancy.  She did undergo surveillance CT scan July 2019 with a left lower lobe nodule, 1.1 cm noted.  She then underwent PET scan 7/24/2019 with no metabolic activity identified.   The patient reports today she overall feels very well.  She does have some chronic shortness of breath on exertion though recovers quickly with rest.  She has been exercising and changed her diet, therefore has lost some weight.  She continues to have a chronic cough with occasional productive clear sputum.  She is attempting to stop smoking, though continues to smoke 1 pack/day.  She was recently prescribed Chantix by pulmonology.  She denies any new pain.  The patient is next seen January 2, 2020.  Unfortunately she is no longer covered by insurance though she remains, understandably, concerned about a previously noted left lower lobe pulmonary nodule.  A follow-up scan was scheduled though she is not able to cover that currently financially.      Plans were made for reassessment and the patient is next seen February 12, 2020 with repeat chest x-ray showing again a 1 cm lung nodule projecting just lateral to the left hilum between the level of the posterior left eighth and ninth ribs.  Fortunately she has not further  symptomatic at this point additionally.  Past Medical History:   Diagnosis Date   • Asthma    • Bleeding of blood vessel 01/05/2011    BRAIN   • Carpal tunnel syndrome on right    • COPD (chronic obstructive pulmonary disease) (CMS/[facility])     MODERATE   • DDD (degenerative disc disease), cervical 09/22/2014    MODERATE C6-C7, SEEING DR. EWING   • Emphysema of lung (CMS/[facility])    • Fibroids     UTERINE X 2   • Hypertension    • MI (myocardial infarction) (CMS/[facility]) 06/2009   • Mitral insufficiency    • Non-small cell carcinoma of lung (CMS/[facility])     Stabe IIA   • Ovarian cyst     RIGHT   • Pain of right upper extremity 11/18/2015   • Pelvic pain    • Right shoulder pain    • SOB (shortness of breath)    • Thyroid nodule    • Vertigo 05/13/2015   • Weight loss        ONCOLOGIC HISTORY:  (History from previous dates can be found in the separate document.)    Current Outpatient Medications on File Prior to Visit   Medication Sig Dispense Refill   • albuterol sulfate HFA (VENTOLIN HFA) 108 (90 Base) MCG/ACT inhaler Inhale 2 puffs Every 4 (Four) Hours As Needed for Wheezing.     • cyclobenzaprine (FLEXERIL) 10 MG tablet Every 12 (Twelve) Hours.     • diclofenac (VOLTAREN) 1 % gel gel APPLY 2 GRAMS TO THE AFFECTED AREA(S) TOPICALLY 4 TIMES DAILY     • gabapentin (NEURONTIN) 300 MG capsule TAKE 1 CAPSULE BY MOUTH TWICE DAILY AS NEEDED FOR 30 DAYS     • HYDROcodone-acetaminophen (NORCO) 7.5-325 MG per tablet Take 1 tablet by mouth As Needed for moderate pain (4-6).     • meclizine (ANTIVERT) 25 MG tablet Take 1 tablet by mouth Every 8 (Eight) Hours As Needed for dizziness. 60 tablet 1   • nicotine (NICODERM CQ) 21 MG/24HR patch Place 1 patch on the skin Daily. 30 patch 2   • [DISCONTINUED] gabapentin (NEURONTIN) 100 MG capsule Take 100 mg by mouth Daily.       No current facility-administered medications on file prior to visit.        ALLERGIES:     Allergies   Allergen Reactions   • No Known Drug Allergy Unknown - Low Severity  "      Social History     Socioeconomic History   • Marital status:      Spouse name: Not on file   • Number of children: Not on file   • Years of education: College   • Highest education level: Not on file   Occupational History   • Occupation:      Employer: [employer]   Tobacco Use   • Smoking status: Current Every Day Smoker     Packs/day: 1.00     Years: 20.00     Pack years: 20.00     Types: Cigarettes   • Smokeless tobacco: Current User   • Tobacco comment: DECREASED ABOUT TO 1/3 PPD ON 4-26-12   Substance and Sexual Activity   • Alcohol use: Yes     Comment: OCCASIONAL   • Drug use: Defer   • Sexual activity: Defer         Cancer-related family history includes Brain cancer in her father; Cancer (age of onset: 47) in her mother.     I have reviewed the patient's medical history in detail and updated the computerized patient record.    Review of Systems   Constitutional: Negative for chills, fatigue and fever.   HENT: Negative for mouth sores and sore throat.    Eyes: Negative for visual disturbance.   Respiratory: Negative for cough, chest tightness, shortness of breath (chronic unchanged) and wheezing.    Cardiovascular: Negative for chest pain and leg swelling.   Gastrointestinal: Negative for abdominal pain, constipation, diarrhea, nausea and vomiting.   Genitourinary: Negative for dysuria and frequency.   Musculoskeletal: Positive for back pain, joint swelling and neck pain.   Neurological: Negative for dizziness, weakness and numbness.   Hematological: Does not bruise/bleed easily.   Psychiatric/Behavioral: The patient is not nervous/anxious.    All other systems reviewed and are negative.  Review of systems 09/18/2019  unchanged from previous office visit except as updated.       Objective      Vitals:    02/12/20 1550   BP: 109/71   Pulse: 98   Resp: 16   Temp: 98.1 °F (36.7 °C)   TempSrc: Oral   SpO2: 97%   Weight: 63.9 kg (140 lb 12.8 oz)   Height: 160 cm (62.99\")   PainSc: " 0-No pain     Current Status 2/12/2020   ECOG score 0       Physical Exam    GENERAL:  female alert and oriented.   SKIN: Warm, dry without rashes, purpura or petechiae.   HEAD: Normocephalic.   EYES: Pupils equal, round and reactive to light. EOMs intact. Conjunctivae normal.   EARS: Hearing intact.   NOSE: Septum midline. No excoriations or nasal discharge.   MOUTH: Tongue is well-papillated; no stomatitis or ulcers. Lips normal.   THROAT: Oropharynx without lesions or exudates.   NECK: Supple with good range of motion; no thyromegaly or masses, no JVD or bruits.   LYMPHATICS: No cervical, supraclavicular adenopathy.   CHEST: Lungs clear to auscultation, prolonged expiratory phase noted bilaterally.   CARDIAC: Regular rate and rhythm without murmurs, rubs or gallops. Bowel sounds present.  ABDOMEN: Soft, nontender with no organomegaly or masses.   EXTREMITIES: No clubbing, cyanosis or edema.   NEUROLOGICAL: No focal neurological deficits.   Physical exam 09/18/2019 unchanged from previous office visit except as updated.      RECENT LABS:              PA AND LATERAL CHEST X-RAY 2/4/2020    FINDINGS: The cardiomediastinal silhouette is normal. There are surgical  chain sutures superior to the right hilum. The lungs are clear except  for what is suspected to be the 1 cm lung nodule seen on the previous  chest CT, projecting just lateral to the left hilum between the level of  the posterior left 8th and 9th ribs. Exact interval change evaluation of  that nodule would require CT imaging. The costophrenic sulci are dry and  the bones appear normal. There is no pneumothorax.     IMPRESSION:  An approximately 1 cm nodular density projects over the left  midlung zone and seems to correlate with a noncalcified nodule seen in  the left lower lobe posteriorly on chest CT 07/08/2019. There is  postoperative change from previous right upper lobectomy.     This report was finalized on 2/4/2020 4:24 PM by Dr. Lou  OFELIA Fishman.    Assessment/Plan      1. Nonsmall cell lung carcinoma, stage IIA (down staged from presumed stage IIIA).  Staging MRI of the brain negative. The patient received neoadjuvant chemotherapy with carboplatin/Alimta x2. Followup scans showed considerable response. The patient is status post right upper lobectomy and lymphadenectomy (discharged 03/07/2012). Per Thoracic Conference, additional two cycles of adjuvant carboplatin/Alimta was advised and initiated on 04/11/2012. She has since remained in remission.    Low-dose screening CT of the chest July 2018 with a 1.1 left lower lobe nodule noted.  Follow-up PET scan the nodule was photopenic.  The patient has a follow-up review now in February 2020 after we have her apply for assistance with [facility].  A chest x-ray be requested in 5 weeks to see the physician in 6 weeks.  This proceeded with patient assessed February 12, 2020 with chest x-ray February 4  not significantly changed from previous.  At this point will reassess again at 6 months with MD, chest x-ray CBC and CMP.    2.  COPD.  Currently without symptoms of exacerbation.  She does see pulmonology.    3.  Smoking cessation.  This was once again discussed today.  She reports she was recently again prescribed Chantix.    4. Chronic pain. The patient is seen and managed by pain management.       Ever Burns MD  09/18/2019

## 2020-02-12 ENCOUNTER — OFFICE VISIT (OUTPATIENT)
Dept: ONCOLOGY | Facility: CLINIC | Age: 60
End: 2020-02-12

## 2020-02-12 ENCOUNTER — APPOINTMENT (OUTPATIENT)
Dept: LAB | Facility: HOSPITAL | Age: 60
End: 2020-02-12

## 2020-02-12 VITALS
HEART RATE: 98 BPM | BODY MASS INDEX: 24.95 KG/M2 | WEIGHT: 140.8 LBS | SYSTOLIC BLOOD PRESSURE: 109 MMHG | HEIGHT: 63 IN | DIASTOLIC BLOOD PRESSURE: 71 MMHG | TEMPERATURE: 98.1 F | OXYGEN SATURATION: 97 % | RESPIRATION RATE: 16 BRPM

## 2020-02-12 DIAGNOSIS — C34.11 MALIGNANT NEOPLASM OF UPPER LOBE OF RIGHT LUNG (HCC): Primary | ICD-10-CM

## 2020-02-12 PROCEDURE — 99213 OFFICE O/P EST LOW 20 MIN: CPT | Performed by: INTERNAL MEDICINE

## 2020-02-12 RX ORDER — GABAPENTIN 300 MG/1
CAPSULE ORAL
COMMUNITY
Start: 2020-01-03 | End: 2021-03-05

## 2020-07-20 ENCOUNTER — TRANSCRIBE ORDERS (OUTPATIENT)
Dept: ADMINISTRATIVE | Facility: HOSPITAL | Age: 60
End: 2020-07-20

## 2020-07-20 DIAGNOSIS — R91.8 LUNG MASS: Primary | ICD-10-CM

## 2020-08-05 ENCOUNTER — TELEPHONE (OUTPATIENT)
Dept: ONCOLOGY | Facility: CLINIC | Age: 60
End: 2020-08-05

## 2020-08-05 ENCOUNTER — TELEPHONE (OUTPATIENT)
Dept: SURGERY | Facility: CLINIC | Age: 60
End: 2020-08-05

## 2020-08-05 NOTE — TELEPHONE ENCOUNTER
S/W VINCE AT DR. WATERMAN'S OFFICE.  SHE RECEIVED A CALL FROM [facility] DEPART. THAT THEY NEED A ORDER FOR A CT SCAN.  VINCE STATES THEY DID NOT ORDER A SCAN.  PT. WAS LAST SEEN THERE ON AUGUST 2019 AND WHEN SHE WAS GIVEN 2 DIFFERENT APPTS TO RETURN SHE WAS A NO SHOW.  SHE THOUGHT MAYBE DR. LANTIGUA ORDERED THE SCAN.  THE ORDER FOR THE SCAN WAS PLACED BY PHAN SALINAS??  S/W DR. LANTIGUA, HE SAW PT. ON 2/12/20 AND SHE WILL GET A CXR ON 8/5/20 WHICH IS TODAY AND WILL RETURN TO SEE DR. LANTIGUA ON 8/12/20.  HE DOES NOT FEEL SHE NEEDS A CT SCAN AT THIS TIME.  V/U.

## 2020-08-05 NOTE — TELEPHONE ENCOUNTER
Spoke with Cassy at [facility] regarding a CT order that was put in under Dr. Victoria's name. We have not seen this patient since 8/2019 and the patient was scheduled in November for a 3 month follow-up which the patient no showed to both appointments. We tried to reach the patient but was unsuccessful which this was noted in the chart.     Some how an order was placed under Dr. Victoria's name without our knowledge and looking deeper we are not sure how this CT order got transcribed into her chart. Cassy spoke with Dr. Andrea and they are only wanting a CXR.     I canceled the CT scan and tried to contact the patient to let her know that it was canceled but it went straight to voicemail and the mailbox was full.

## 2020-08-07 ENCOUNTER — APPOINTMENT (OUTPATIENT)
Dept: CT IMAGING | Facility: HOSPITAL | Age: 60
End: 2020-08-07

## 2020-08-07 ENCOUNTER — HOSPITAL ENCOUNTER (OUTPATIENT)
Dept: GENERAL RADIOLOGY | Facility: HOSPITAL | Age: 60
Discharge: HOME | End: 2020-08-07
Admitting: INTERNAL MEDICINE

## 2020-08-07 DIAGNOSIS — C34.11 MALIGNANT NEOPLASM OF UPPER LOBE OF RIGHT LUNG (HCC): ICD-10-CM

## 2020-08-07 PROCEDURE — 71046 X-RAY EXAM CHEST 2 VIEWS: CPT

## 2020-08-12 ENCOUNTER — APPOINTMENT (OUTPATIENT)
Dept: LAB | Facility: HOSPITAL | Age: 60
End: 2020-08-12

## 2020-08-25 ENCOUNTER — LAB (OUTPATIENT)
Dept: LAB | Facility: HOSPITAL | Age: 60
End: 2020-08-25

## 2020-08-25 DIAGNOSIS — C34.11 MALIGNANT NEOPLASM OF UPPER LOBE OF RIGHT LUNG (HCC): ICD-10-CM

## 2020-08-25 LAB
ALBUMIN SERPL-MCNC: 4.4 G/DL (ref 3.5–5.2)
ALBUMIN/GLOB SERPL: 1.2 G/DL (ref 1.1–2.4)
ALP SERPL-CCNC: 73 U/L (ref 38–116)
ALT SERPL W P-5'-P-CCNC: 19 U/L (ref 0–33)
ANION GAP SERPL CALCULATED.3IONS-SCNC: 11.7 MMOL/L (ref 5–15)
AST SERPL-CCNC: 24 U/L (ref 0–32)
BASOPHILS # BLD AUTO: 0.03 10*3/MM3 (ref 0–0.2)
BASOPHILS NFR BLD AUTO: 0.3 % (ref 0–1.5)
BILIRUB SERPL-MCNC: 0.2 MG/DL (ref 0.2–1.2)
BUN SERPL-MCNC: 12 MG/DL (ref 6–20)
BUN/CREAT SERPL: 13.8 (ref 7.3–30)
CALCIUM SPEC-SCNC: 10.2 MG/DL (ref 8.5–10.2)
CHLORIDE SERPL-SCNC: 104 MMOL/L (ref 98–107)
CO2 SERPL-SCNC: 28.3 MMOL/L (ref 22–29)
CREAT SERPL-MCNC: 0.87 MG/DL (ref 0.6–1.1)
DEPRECATED RDW RBC AUTO: 42.2 FL (ref 37–54)
EOSINOPHIL # BLD AUTO: 0.25 10*3/MM3 (ref 0–0.4)
EOSINOPHIL NFR BLD AUTO: 2.8 % (ref 0.3–6.2)
ERYTHROCYTE [DISTWIDTH] IN BLOOD BY AUTOMATED COUNT: 13.5 % (ref 12.3–15.4)
GFR SERPL CREATININE-BSD FRML MDRD: 81 ML/MIN/1.73
GLOBULIN UR ELPH-MCNC: 3.6 GM/DL (ref 1.8–3.5)
GLUCOSE SERPL-MCNC: 106 MG/DL (ref 74–124)
HCT VFR BLD AUTO: 41.9 % (ref 34–46.6)
HGB BLD-MCNC: 13.8 G/DL (ref 12–15.9)
IMM GRANULOCYTES # BLD AUTO: 0.02 10*3/MM3 (ref 0–0.05)
IMM GRANULOCYTES NFR BLD AUTO: 0.2 % (ref 0–0.5)
LYMPHOCYTES # BLD AUTO: 3.79 10*3/MM3 (ref 0.7–3.1)
LYMPHOCYTES NFR BLD AUTO: 42.7 % (ref 19.6–45.3)
MCH RBC QN AUTO: 28.2 PG (ref 26.6–33)
MCHC RBC AUTO-ENTMCNC: 32.9 G/DL (ref 31.5–35.7)
MCV RBC AUTO: 85.7 FL (ref 79–97)
MONOCYTES # BLD AUTO: 0.59 10*3/MM3 (ref 0.1–0.9)
MONOCYTES NFR BLD AUTO: 6.7 % (ref 5–12)
NEUTROPHILS NFR BLD AUTO: 4.19 10*3/MM3 (ref 1.7–7)
NEUTROPHILS NFR BLD AUTO: 47.3 % (ref 42.7–76)
NRBC BLD AUTO-RTO: 0 /100 WBC (ref 0–0.2)
PLATELET # BLD AUTO: 288 10*3/MM3 (ref 140–450)
PMV BLD AUTO: 8.8 FL (ref 6–12)
POTASSIUM SERPL-SCNC: 4 MMOL/L (ref 3.5–4.7)
PROT SERPL-MCNC: 8 G/DL (ref 6.3–8)
RBC # BLD AUTO: 4.89 10*6/MM3 (ref 3.77–5.28)
SODIUM SERPL-SCNC: 144 MMOL/L (ref 134–145)
WBC # BLD AUTO: 8.87 10*3/MM3 (ref 3.4–10.8)

## 2020-08-25 PROCEDURE — 36415 COLL VENOUS BLD VENIPUNCTURE: CPT

## 2020-08-25 PROCEDURE — 80053 COMPREHEN METABOLIC PANEL: CPT

## 2020-08-25 PROCEDURE — 85025 COMPLETE CBC W/AUTO DIFF WBC: CPT

## 2020-09-03 ENCOUNTER — OFFICE VISIT (OUTPATIENT)
Dept: ONCOLOGY | Facility: CLINIC | Age: 60
End: 2020-09-03

## 2020-09-03 ENCOUNTER — APPOINTMENT (OUTPATIENT)
Dept: LAB | Facility: HOSPITAL | Age: 60
End: 2020-09-03

## 2020-09-03 ENCOUNTER — TELEPHONE (OUTPATIENT)
Dept: ONCOLOGY | Facility: CLINIC | Age: 60
End: 2020-09-03

## 2020-09-03 VITALS
WEIGHT: 132.9 LBS | HEIGHT: 63 IN | DIASTOLIC BLOOD PRESSURE: 71 MMHG | SYSTOLIC BLOOD PRESSURE: 111 MMHG | RESPIRATION RATE: 16 BRPM | HEART RATE: 75 BPM | BODY MASS INDEX: 23.55 KG/M2 | TEMPERATURE: 97.1 F | OXYGEN SATURATION: 99 %

## 2020-09-03 DIAGNOSIS — C34.11 MALIGNANT NEOPLASM OF UPPER LOBE OF RIGHT LUNG (HCC): Primary | ICD-10-CM

## 2020-09-03 PROCEDURE — 99213 OFFICE O/P EST LOW 20 MIN: CPT | Performed by: INTERNAL MEDICINE

## 2020-09-03 NOTE — TELEPHONE ENCOUNTER
CSW attempted to contact patient to assess financial concerns.  CSW left message for patient with contact information and requested a return call.  CSW will continue to follow up.

## 2021-02-05 ENCOUNTER — OFFICE VISIT (OUTPATIENT)
Dept: FAMILY MEDICINE CLINIC | Facility: CLINIC | Age: 61
End: 2021-02-05

## 2021-02-05 VITALS
TEMPERATURE: 96.8 F | OXYGEN SATURATION: 98 % | SYSTOLIC BLOOD PRESSURE: 110 MMHG | HEART RATE: 75 BPM | WEIGHT: 124 LBS | DIASTOLIC BLOOD PRESSURE: 70 MMHG | HEIGHT: 63 IN | BODY MASS INDEX: 21.97 KG/M2

## 2021-02-05 DIAGNOSIS — C34.11 MALIGNANT NEOPLASM OF UPPER LOBE OF RIGHT LUNG (HCC): Primary | Chronic | ICD-10-CM

## 2021-02-05 DIAGNOSIS — Z87.42 HISTORY OF DYSMENORRHEA: ICD-10-CM

## 2021-02-05 DIAGNOSIS — J40 BRONCHITIS: Chronic | ICD-10-CM

## 2021-02-05 PROCEDURE — 99204 OFFICE O/P NEW MOD 45 MIN: CPT | Performed by: INTERNAL MEDICINE

## 2021-02-05 NOTE — PROGRESS NOTES
02/05/2021    CC: Leg Swelling (bilateral-that;s all), Follow-up (check up), Establish Care, and Pain (left leg hurts after sitting for a long time; right shoulder and neck & wer back)  .        HPI  This patient presents for her first visit to the practice.  She relates that she has not seen a primary care physician for at least 7 to 8 years.  She has a significant past medical history of right upper lobe CA of the lung has been followed by Dr. Del Angel her oncologist.  She relates she is had no shortness of breath but would like to get a handle on some of her medical problems as she has been without a primary care doctor for such a long time.  She has multiple somatic complaints but it relates that none of these are really pressing ongoing at this point.  But she would like to get to the bottom of some of her aches and pains.    Leg Swelling    Pain         Subjective   Yolanda L Hatchett is a 60 y.o. female.      The following portions of the patient's history were reviewed and updated as appropriate: allergies, current medications, past family history, past medical history, past social history, past surgical history and problem list.    Problem List  Patient Active Problem List   Diagnosis   • Lung cancer, upper lobe (CMS/HCC)   • COPD (chronic obstructive pulmonary disease) (CMS/[facility])   • Pulmonary nodule, left       Past Medical History  Past Medical History:   Diagnosis Date   • Asthma    • Bleeding of blood vessel 01/05/2011    BRAIN   • Carpal tunnel syndrome on right    • COPD (chronic obstructive pulmonary disease) (CMS/[facility])     MODERATE   • DDD (degenerative disc disease), cervical 09/22/2014    MODERATE C6-C7, SEEING DR. EWING   • Emphysema of lung (CMS/[facility])    • Fibroids     UTERINE X 2   • Hypertension    • MI (myocardial infarction) (CMS/HCC) 06/2009   • Mitral insufficiency    • Non-small cell carcinoma of lung (CMS/HCC)     Stabe IIA   • Ovarian cyst     RIGHT   • Pain of right upper extremity  11/18/2015   • Pelvic pain    • Right shoulder pain    • SOB (shortness of breath)    • Thyroid nodule    • Vertigo 05/13/2015   • Weight loss        Surgical History  Past Surgical History:   Procedure Laterality Date   • APPENDECTOMY     • CATARACT EXTRACTION, BILATERAL  03/2019   • LUNG LOBECTOMY Right 2012    upper lobectomy and lymphadenectomy       Family History  Family History   Problem Relation Age of Onset   • Cancer Mother 47        Brain/lungs   • Brain cancer Father        Social History  Social History    Tobacco Use      Smoking status: Current Every Day Smoker        Packs/day: 0.50        Years: 20.00        Pack years: 10        Types: Cigarettes      Smokeless tobacco: Current User      Tobacco comment: DECREASED ABOUT TO 1/3 PPD ON 4-26-12       Is the Patient a current tobacco user? No    Allergies  Allergies   Allergen Reactions   • No Known Drug Allergy Unknown - Low Severity       Current Medications    Current Outpatient Medications:   •  albuterol sulfate HFA (VENTOLIN HFA) 108 (90 Base) MCG/ACT inhaler, Inhale 2 puffs Every 4 (Four) Hours As Needed for Wheezing., Disp: , Rfl:   •  cyclobenzaprine (FLEXERIL) 10 MG tablet, Every 12 (Twelve) Hours., Disp: , Rfl:   •  diclofenac (VOLTAREN) 1 % gel gel, APPLY 2 GRAMS TO THE AFFECTED AREA(S) TOPICALLY 4 TIMES DAILY, Disp: , Rfl:   •  gabapentin (NEURONTIN) 300 MG capsule, TAKE 1 CAPSULE BY MOUTH TWICE DAILY AS NEEDED FOR 30 DAYS, Disp: , Rfl:   •  HYDROcodone-acetaminophen (NORCO) 7.5-325 MG per tablet, Take 1 tablet by mouth As Needed for moderate pain (4-6)., Disp: , Rfl:   •  meclizine (ANTIVERT) 25 MG tablet, Take 1 tablet by mouth Every 8 (Eight) Hours As Needed for dizziness., Disp: 60 tablet, Rfl: 1  •  nicotine (NICODERM CQ) 21 MG/24HR patch, Place 1 patch on the skin Daily., Disp: 30 patch, Rfl: 2     Review of System  Review of Systems   Constitutional: Negative.    Eyes: Negative.    Cardiovascular: Negative.    Gastrointestinal:  Negative.      I have reviewed and confirmed the accuracy of the ROS as documented by the MA/LPN/RN Jovany Wiley MD    Vitals:    02/05/21 0741   BP: 110/70   Pulse: 75   Temp: 96.8 °F (36 °C)   SpO2: 98%     Body mass index is 21.97 kg/m².    Objective     Physical Exam  Physical Exam  Constitutional:       Appearance: Normal appearance.   HENT:      Head: Normocephalic.   Eyes:      Extraocular Movements: Extraocular movements intact.   Neck:      Musculoskeletal: Normal range of motion and neck supple.   Cardiovascular:      Rate and Rhythm: Normal rate and regular rhythm.   Pulmonary:      Effort: Pulmonary effort is normal.      Breath sounds: Normal breath sounds.   Abdominal:      General: Abdomen is flat.   Musculoskeletal: Normal range of motion.   Skin:     General: Skin is warm.   Neurological:      General: No focal deficit present.         Assessment/Plan      Patient has a number of somatic complaints that she relates she would like to get to eventually but none are pressing at this time.  Her last visit with Dr. Mer kapoor comes 9/3/20/2020 was reviewed by me.  Note is made that she has had 8 pound weight loss since then.  She was diagnosed with right upper lobe lung cancer in 2011.  She also had an MRI she relates in 2009, and aneurysm in 2010.  She has been on a number of chronic pain medicines although she relates she has not had anything for about a year.  She was taking gabapentin for neck and shoulder pain that was dispensed by [facility] pain [place].  She also was on hydrocodone at one time but again has not had anything for the past year or so.    She relates she has not had a gynecologic exam for greater than 10 years.        Diagnoses and all orders for this visit:    1. Malignant neoplasm of upper lobe of right lung (CMS/HCC) (Primary)  -     Comprehensive metabolic panel  -     Lipid Panel w/ Chol/HDL Ratio  -     CBC w AUTO Differential  -     Hepatitis C antibody    2. Pap  smear for cervical cancer screening  -     Ambulatory Referral to Gynecology    3. Bronchitis             Jovany Wiley MD  02/05/2021

## 2021-02-06 LAB
ALBUMIN SERPL-MCNC: 4.2 G/DL (ref 3.8–4.9)
ALBUMIN/GLOB SERPL: 1.2 {RATIO} (ref 1.2–2.2)
ALP SERPL-CCNC: 74 IU/L (ref 39–117)
ALT SERPL-CCNC: 15 IU/L (ref 0–32)
AST SERPL-CCNC: 19 IU/L (ref 0–40)
BASOPHILS # BLD AUTO: 0 X10E3/UL (ref 0–0.2)
BASOPHILS NFR BLD AUTO: 1 %
BILIRUB SERPL-MCNC: 0.3 MG/DL (ref 0–1.2)
BUN SERPL-MCNC: 12 MG/DL (ref 6–24)
BUN/CREAT SERPL: 15 (ref 9–23)
CALCIUM SERPL-MCNC: 10.2 MG/DL (ref 8.7–10.2)
CHLORIDE SERPL-SCNC: 107 MMOL/L (ref 96–106)
CHOLEST SERPL-MCNC: 201 MG/DL (ref 100–199)
CHOLEST/HDLC SERPL: 3 RATIO (ref 0–4.4)
CO2 SERPL-SCNC: 26 MMOL/L (ref 20–29)
CREAT SERPL-MCNC: 0.79 MG/DL (ref 0.57–1)
EOSINOPHIL # BLD AUTO: 0.2 X10E3/UL (ref 0–0.4)
EOSINOPHIL NFR BLD AUTO: 3 %
ERYTHROCYTE [DISTWIDTH] IN BLOOD BY AUTOMATED COUNT: 13 % (ref 11.7–15.4)
GLOBULIN SER CALC-MCNC: 3.6 G/DL (ref 1.5–4.5)
GLUCOSE SERPL-MCNC: 84 MG/DL (ref 65–99)
HCT VFR BLD AUTO: 43.1 % (ref 34–46.6)
HCV AB S/CO SERPL IA: <0.1 S/CO RATIO (ref 0–0.9)
HDLC SERPL-MCNC: 67 MG/DL
HGB BLD-MCNC: 14.4 G/DL (ref 11.1–15.9)
IMM GRANULOCYTES # BLD AUTO: 0 X10E3/UL (ref 0–0.1)
IMM GRANULOCYTES NFR BLD AUTO: 0 %
LDLC SERPL CALC-MCNC: 117 MG/DL (ref 0–99)
LYMPHOCYTES # BLD AUTO: 2.2 X10E3/UL (ref 0.7–3.1)
LYMPHOCYTES NFR BLD AUTO: 40 %
MCH RBC QN AUTO: 28.3 PG (ref 26.6–33)
MCHC RBC AUTO-ENTMCNC: 33.4 G/DL (ref 31.5–35.7)
MCV RBC AUTO: 85 FL (ref 79–97)
MONOCYTES # BLD AUTO: 0.4 X10E3/UL (ref 0.1–0.9)
MONOCYTES NFR BLD AUTO: 7 %
NEUTROPHILS # BLD AUTO: 2.6 X10E3/UL (ref 1.4–7)
NEUTROPHILS NFR BLD AUTO: 49 %
PLATELET # BLD AUTO: 315 X10E3/UL (ref 150–450)
POTASSIUM SERPL-SCNC: 4.2 MMOL/L (ref 3.5–5.2)
PROT SERPL-MCNC: 7.8 G/DL (ref 6–8.5)
RBC # BLD AUTO: 5.09 X10E6/UL (ref 3.77–5.28)
SODIUM SERPL-SCNC: 146 MMOL/L (ref 134–144)
TRIGL SERPL-MCNC: 95 MG/DL (ref 0–149)
VLDLC SERPL CALC-MCNC: 17 MG/DL (ref 5–40)
WBC # BLD AUTO: 5.4 X10E3/UL (ref 3.4–10.8)

## 2021-02-15 DIAGNOSIS — C34.11 MALIGNANT NEOPLASM OF UPPER LOBE OF RIGHT LUNG (HCC): Primary | ICD-10-CM

## 2021-02-18 ENCOUNTER — TELEPHONE (OUTPATIENT)
Dept: ONCOLOGY | Facility: CLINIC | Age: 61
End: 2021-02-18

## 2021-02-18 ENCOUNTER — APPOINTMENT (OUTPATIENT)
Dept: LAB | Facility: HOSPITAL | Age: 61
End: 2021-02-18

## 2021-02-18 NOTE — TELEPHONE ENCOUNTER
Caller: Yolanda Hatchett    Relationship to patient: self    Best call back number: [phone]    Chief complaint: na    Type of visit: follow up    Requested date: 3/1/21    If rescheduling, when is the original appointment: 2/18/21    Additional notes: due to the weather, pt rescheduled today's appt to 3/1/21

## 2021-02-22 ENCOUNTER — HOSPITAL ENCOUNTER (OUTPATIENT)
Dept: GENERAL RADIOLOGY | Facility: HOSPITAL | Age: 61
Discharge: HOME | End: 2021-02-22
Admitting: INTERNAL MEDICINE

## 2021-02-22 DIAGNOSIS — C34.11 MALIGNANT NEOPLASM OF UPPER LOBE OF RIGHT LUNG (HCC): Primary | ICD-10-CM

## 2021-02-22 DIAGNOSIS — C34.11 MALIGNANT NEOPLASM OF UPPER LOBE OF RIGHT LUNG (HCC): ICD-10-CM

## 2021-02-22 PROCEDURE — 71046 X-RAY EXAM CHEST 2 VIEWS: CPT

## 2021-03-01 ENCOUNTER — LAB (OUTPATIENT)
Dept: LAB | Facility: HOSPITAL | Age: 61
End: 2021-03-01

## 2021-03-01 ENCOUNTER — OFFICE VISIT (OUTPATIENT)
Dept: ONCOLOGY | Facility: CLINIC | Age: 61
End: 2021-03-01

## 2021-03-01 VITALS
HEART RATE: 74 BPM | RESPIRATION RATE: 18 BRPM | TEMPERATURE: 99.3 F | BODY MASS INDEX: 22.5 KG/M2 | SYSTOLIC BLOOD PRESSURE: 118 MMHG | DIASTOLIC BLOOD PRESSURE: 76 MMHG | HEIGHT: 63 IN | OXYGEN SATURATION: 97 % | WEIGHT: 127 LBS

## 2021-03-01 DIAGNOSIS — C34.11 MALIGNANT NEOPLASM OF UPPER LOBE OF RIGHT LUNG (HCC): Primary | ICD-10-CM

## 2021-03-01 DIAGNOSIS — C34.11 MALIGNANT NEOPLASM OF UPPER LOBE OF RIGHT LUNG (HCC): ICD-10-CM

## 2021-03-01 LAB
ALBUMIN SERPL-MCNC: 4.3 G/DL (ref 3.5–5.2)
ALBUMIN/GLOB SERPL: 1.2 G/DL (ref 1.1–2.4)
ALP SERPL-CCNC: 74 U/L (ref 38–116)
ALT SERPL W P-5'-P-CCNC: 15 U/L (ref 0–33)
ANION GAP SERPL CALCULATED.3IONS-SCNC: 9.7 MMOL/L (ref 5–15)
AST SERPL-CCNC: 20 U/L (ref 0–32)
BASOPHILS # BLD AUTO: 0.06 10*3/MM3 (ref 0–0.2)
BASOPHILS NFR BLD AUTO: 0.8 % (ref 0–1.5)
BILIRUB SERPL-MCNC: 0.3 MG/DL (ref 0.2–1.2)
BUN SERPL-MCNC: 9 MG/DL (ref 6–20)
BUN/CREAT SERPL: 10.3 (ref 7.3–30)
CALCIUM SPEC-SCNC: 9.7 MG/DL (ref 8.5–10.2)
CHLORIDE SERPL-SCNC: 105 MMOL/L (ref 98–107)
CO2 SERPL-SCNC: 27.3 MMOL/L (ref 22–29)
CREAT SERPL-MCNC: 0.87 MG/DL (ref 0.6–1.1)
DEPRECATED RDW RBC AUTO: 42.3 FL (ref 37–54)
EOSINOPHIL # BLD AUTO: 0.29 10*3/MM3 (ref 0–0.4)
EOSINOPHIL NFR BLD AUTO: 3.7 % (ref 0.3–6.2)
ERYTHROCYTE [DISTWIDTH] IN BLOOD BY AUTOMATED COUNT: 13.5 % (ref 12.3–15.4)
GFR SERPL CREATININE-BSD FRML MDRD: 81 ML/MIN/1.73
GLOBULIN UR ELPH-MCNC: 3.5 GM/DL (ref 1.8–3.5)
GLUCOSE SERPL-MCNC: 89 MG/DL (ref 74–124)
HCT VFR BLD AUTO: 40.8 % (ref 34–46.6)
HGB BLD-MCNC: 13.6 G/DL (ref 12–15.9)
IMM GRANULOCYTES # BLD AUTO: 0.02 10*3/MM3 (ref 0–0.05)
IMM GRANULOCYTES NFR BLD AUTO: 0.3 % (ref 0–0.5)
LYMPHOCYTES # BLD AUTO: 4.23 10*3/MM3 (ref 0.7–3.1)
LYMPHOCYTES NFR BLD AUTO: 53.7 % (ref 19.6–45.3)
MCH RBC QN AUTO: 28.3 PG (ref 26.6–33)
MCHC RBC AUTO-ENTMCNC: 33.3 G/DL (ref 31.5–35.7)
MCV RBC AUTO: 84.8 FL (ref 79–97)
MONOCYTES # BLD AUTO: 0.56 10*3/MM3 (ref 0.1–0.9)
MONOCYTES NFR BLD AUTO: 7.1 % (ref 5–12)
NEUTROPHILS NFR BLD AUTO: 2.71 10*3/MM3 (ref 1.7–7)
NEUTROPHILS NFR BLD AUTO: 34.4 % (ref 42.7–76)
NRBC BLD AUTO-RTO: 0 /100 WBC (ref 0–0.2)
PLATELET # BLD AUTO: 277 10*3/MM3 (ref 140–450)
PMV BLD AUTO: 8.5 FL (ref 6–12)
POTASSIUM SERPL-SCNC: 3.8 MMOL/L (ref 3.5–4.7)
PROT SERPL-MCNC: 7.8 G/DL (ref 6.3–8)
RBC # BLD AUTO: 4.81 10*6/MM3 (ref 3.77–5.28)
SODIUM SERPL-SCNC: 142 MMOL/L (ref 134–145)
WBC # BLD AUTO: 7.87 10*3/MM3 (ref 3.4–10.8)

## 2021-03-01 PROCEDURE — 36415 COLL VENOUS BLD VENIPUNCTURE: CPT

## 2021-03-01 PROCEDURE — 85025 COMPLETE CBC W/AUTO DIFF WBC: CPT

## 2021-03-01 PROCEDURE — 80053 COMPREHEN METABOLIC PANEL: CPT

## 2021-03-01 PROCEDURE — 99213 OFFICE O/P EST LOW 20 MIN: CPT | Performed by: INTERNAL MEDICINE

## 2021-03-05 ENCOUNTER — OFFICE VISIT (OUTPATIENT)
Dept: FAMILY MEDICINE CLINIC | Facility: CLINIC | Age: 61
End: 2021-03-05

## 2021-03-05 VITALS
OXYGEN SATURATION: 98 % | BODY MASS INDEX: 22.67 KG/M2 | DIASTOLIC BLOOD PRESSURE: 62 MMHG | SYSTOLIC BLOOD PRESSURE: 110 MMHG | HEART RATE: 81 BPM | TEMPERATURE: 97.3 F | HEIGHT: 62 IN | RESPIRATION RATE: 16 BRPM | WEIGHT: 123.2 LBS

## 2021-03-05 DIAGNOSIS — J40 BRONCHITIS: Chronic | ICD-10-CM

## 2021-03-05 DIAGNOSIS — Z00.00 PE (PHYSICAL EXAM), ANNUAL: Primary | ICD-10-CM

## 2021-03-05 DIAGNOSIS — M94.0 COSTOCHONDRITIS: ICD-10-CM

## 2021-03-05 DIAGNOSIS — Z12.11 ENCOUNTER FOR SCREENING FOR MALIGNANT NEOPLASM OF COLON: Chronic | ICD-10-CM

## 2021-03-05 PROCEDURE — 99396 PREV VISIT EST AGE 40-64: CPT | Performed by: INTERNAL MEDICINE

## 2021-03-05 RX ORDER — NAPROXEN 375 MG/1
TABLET ORAL
Qty: 40 TABLET | Refills: 0 | Status: SHIPPED | OUTPATIENT
Start: 2021-03-05 | End: 2021-05-24 | Stop reason: SDUPTHER

## 2021-03-05 NOTE — PROGRESS NOTES
03/05/2021    CC: Annual Exam (...no other issues)  .        HPI  This 60-year-old patient presents at this time for physical examination.  She has a past medical history of lung CA with lobectomy.       Subjective   Yolanda L Hatchett is a 60 y.o. female.      The following portions of the patient's history were reviewed and updated as appropriate: allergies, current medications, past family history, past medical history, past social history, past surgical history and problem list.    Problem List  Patient Active Problem List   Diagnosis   • Lung cancer, upper lobe (CMS/HCC)   • COPD (chronic obstructive pulmonary disease) (CMS/HCC)   • Pulmonary nodule, left       Past Medical History  Past Medical History:   Diagnosis Date   • Asthma    • Bleeding of blood vessel 01/05/2011    BRAIN   • Carpal tunnel syndrome on right    • COPD (chronic obstructive pulmonary disease) (CMS/[facility])     MODERATE   • DDD (degenerative disc disease), cervical 09/22/2014    MODERATE C6-C7, SEEING DR. EWING   • Emphysema of lung (CMS/[facility])    • Fibroids     UTERINE X 2   • Hypertension    • MI (myocardial infarction) (CMS/HCC) 06/2009   • Mitral insufficiency    • Non-small cell carcinoma of lung (CMS/HCC)     Stabe IIA   • Ovarian cyst     RIGHT   • Pain of right upper extremity 11/18/2015   • Pelvic pain    • Right shoulder pain    • SOB (shortness of breath)    • Thyroid nodule    • Vertigo 05/13/2015   • Weight loss        Surgical History  Past Surgical History:   Procedure Laterality Date   • APPENDECTOMY     • CATARACT EXTRACTION, BILATERAL  03/2019   • LUNG LOBECTOMY Right 2012    upper lobectomy and lymphadenectomy       Family History  Family History   Problem Relation Age of Onset   • Cancer Mother 47        Brain/lungs   • Brain cancer Father        Social History  Social History    Tobacco Use      Smoking status: Current Every Day Smoker        Packs/day: 0.50        Years: 20.00        Pack years: 10        Types:  Cigarettes      Smokeless tobacco: Current User      Tobacco comment: DECREASED ABOUT TO 1/3 PPD ON 4-26-12       Is the Patient a current tobacco user? Yes Yolanda L Hatchett  reports that she has been smoking cigarettes. She has a 10.00 pack-year smoking history. She uses smokeless tobacco.. I have educated her on the risk of diseases from using tobacco products such as cancer, COPD and heart disease.     I advised her to quit and she is willing to quit. We have discussed the following method/s for tobacco cessation:  Education Material Counseling.  Together we have set a quit date for 2 weeks from today.  She will follow up with me in 2 week or sooner to check on her progress.    I spent 3  minutes counseling the patient.         Allergies  Allergies   Allergen Reactions   • No Known Drug Allergy Unknown - Low Severity       Current Medications    Current Outpatient Medications:   •  albuterol sulfate HFA (VENTOLIN HFA) 108 (90 Base) MCG/ACT inhaler, Inhale 2 puffs Every 4 (Four) Hours As Needed for Wheezing., Disp: , Rfl:   •  cyclobenzaprine (FLEXERIL) 10 MG tablet, Every 12 (Twelve) Hours., Disp: , Rfl:   •  naproxen (NAPROSYN) 375 MG tablet, Take 1 tablet twice a day with food, Disp: 40 tablet, Rfl: 0     Review of System  Review of Systems   Constitutional: Negative.    HENT: Negative.    Eyes: Negative.    Respiratory: Negative.    Cardiovascular: Positive for chest pain.   Gastrointestinal: Negative.    Musculoskeletal: Negative.         Patient complains of pain over the manubrium sternum such as with deep inspirations and movement of the upper torso.  She relates is been present for about 2 to 3 weeks.   Skin: Negative.    Psychiatric/Behavioral: Negative.      I have reviewed and confirmed the accuracy of the ROS as documented by the MA/LPN/RN Jovany Wiley MD    Vitals:    03/05/21 0812   BP: 110/62   Pulse: 81   Resp: 16   Temp: 97.3 °F (36.3 °C)   SpO2: 98%     Body mass index is 22.53  kg/m².    Objective     Physical Exam  Physical Exam  Vitals signs and nursing note reviewed.   Constitutional:       Appearance: She is well-developed.   HENT:      Head: Normocephalic and atraumatic.   Eyes:      Conjunctiva/sclera: Conjunctivae normal.   Neck:      Musculoskeletal: Normal range of motion and neck supple.   Cardiovascular:      Rate and Rhythm: Normal rate and regular rhythm.      Heart sounds: Normal heart sounds.   Pulmonary:      Effort: Pulmonary effort is normal.      Breath sounds: Normal breath sounds.   Abdominal:      General: Bowel sounds are normal.      Palpations: Abdomen is soft.   Musculoskeletal: Normal range of motion.         General: Tenderness present.      Comments: Point tenderness at the second left intercostal area   Skin:     General: Skin is warm and dry.   Neurological:      Mental Status: She is alert and oriented to person, place, and time.   Psychiatric:         Behavior: Behavior normal.         Assessment/Plan      This pleasant patient presents for physical examination.  She is currently being seen by Dr. Del Angel oncologist with [facility].  She has a history of lung CA and has had a lobectomy done left upper lobe back  in 2012.  She is scheduled to see Dr. Del Angel in a week or so for CT scan of the lung as part of ongoing care of her lung CA.    She has an appointment to see gynecology in July for comprehensive gynecologic evaluation.    Patient also relates that she had an MRI in 2009 is not required medication and has not had any problems since until about 3 days ago when she started with chest discomfort that was worse with movement and tender to touch.  She denied any nausea vomiting.    She also complained of leg tingling that started a few days ago in the right lower extremity.    Re: Anticipatory guidance we had a protracted discussion regarding the importance of COVID-19 vaccination especially in her with a history of COPD.  We also discussed the importance of  her lowering her cholesterol and we started this process by giving her low-cholesterol diet sheet to use as a guide.    I reviewed her labs from 3/1/2020 which showed a normal comprehensive metabolic profile, normal hepatitis C, normal CBC, and elevated LDL at 117 on her lipid profile.          Diagnoses and all orders for this visit:    1. PE (physical exam), annual (Primary)    2. Bronchitis    3. Encounter for screening for malignant neoplasm of colon  -     Ambulatory Referral For Screening Colonoscopy    4. Costochondritis    Other orders  -     naproxen (NAPROSYN) 375 MG tablet; Take 1 tablet twice a day with food  Dispense: 40 tablet; Refill: 0      Plan:  1.)  Naprosyn 375 mg 1 tab p.o. twice daily with food for costochondritis  2.)  Heat to the affected area 15 to 20 minutes twice daily  3.)  Follow-up in the next few weeks for evaluation of costochondritis and tingling in her legs.  4.)  We will also start smoking cessation with her next visit.       Jovany Wiley MD  03/05/2021

## 2021-03-26 ENCOUNTER — OFFICE VISIT (OUTPATIENT)
Dept: FAMILY MEDICINE CLINIC | Facility: CLINIC | Age: 61
End: 2021-03-26

## 2021-03-26 VITALS
DIASTOLIC BLOOD PRESSURE: 60 MMHG | WEIGHT: 123 LBS | HEART RATE: 64 BPM | RESPIRATION RATE: 16 BRPM | OXYGEN SATURATION: 99 % | TEMPERATURE: 97.9 F | SYSTOLIC BLOOD PRESSURE: 110 MMHG | HEIGHT: 62 IN | BODY MASS INDEX: 22.63 KG/M2

## 2021-03-26 DIAGNOSIS — C34.11 MALIGNANT NEOPLASM OF UPPER LOBE OF RIGHT LUNG (HCC): ICD-10-CM

## 2021-03-26 DIAGNOSIS — J45.40 MODERATE PERSISTENT ASTHMA WITHOUT COMPLICATION: Primary | ICD-10-CM

## 2021-03-26 DIAGNOSIS — M25.552 LEFT HIP PAIN: ICD-10-CM

## 2021-03-26 DIAGNOSIS — J45.909 UNCOMPLICATED ASTHMA, UNSPECIFIED ASTHMA SEVERITY, UNSPECIFIED WHETHER PERSISTENT: ICD-10-CM

## 2021-03-26 PROBLEM — C34.90 ADENOCARCINOMA OF LUNG (HCC): Status: ACTIVE | Noted: 2021-03-26

## 2021-03-26 PROBLEM — F90.9 LONG-TERM CURRENT USE OF DRUG THERAPY FOR ATTENTION DEFICIT HYPERACTIVITY DISORDER (ADHD): Status: ACTIVE | Noted: 2017-05-26

## 2021-03-26 PROBLEM — R94.31 ABNORMAL ECG: Status: ACTIVE | Noted: 2021-03-26

## 2021-03-26 PROBLEM — IMO0002 HERNIATION OF INTERVERTEBRAL DISC: Status: ACTIVE | Noted: 2017-02-01

## 2021-03-26 PROBLEM — M19.90 ARTHRITIS: Status: ACTIVE | Noted: 2021-03-26

## 2021-03-26 PROBLEM — R07.9 CHEST PAIN: Status: ACTIVE | Noted: 2021-03-26

## 2021-03-26 PROBLEM — M51.37 DEGENERATION OF LUMBOSACRAL INTERVERTEBRAL DISC: Status: ACTIVE | Noted: 2021-03-26

## 2021-03-26 PROBLEM — M51.379 DEGENERATION OF LUMBOSACRAL INTERVERTEBRAL DISC: Status: ACTIVE | Noted: 2021-03-26

## 2021-03-26 PROBLEM — M54.2 NECK PAIN: Status: ACTIVE | Noted: 2021-03-26

## 2021-03-26 PROBLEM — R05.9 COUGH: Status: ACTIVE | Noted: 2021-03-26

## 2021-03-26 PROBLEM — M54.50 LOW BACK PAIN: Status: ACTIVE | Noted: 2021-03-26

## 2021-03-26 PROBLEM — Z79.899 LONG-TERM CURRENT USE OF DRUG THERAPY FOR ATTENTION DEFICIT HYPERACTIVITY DISORDER (ADHD): Status: ACTIVE | Noted: 2017-05-26

## 2021-03-26 PROCEDURE — 99214 OFFICE O/P EST MOD 30 MIN: CPT | Performed by: INTERNAL MEDICINE

## 2021-03-26 RX ORDER — ALBUTEROL SULFATE 90 UG/1
2 AEROSOL, METERED RESPIRATORY (INHALATION) EVERY 4 HOURS PRN
Qty: 17 G | Refills: 3 | Status: SHIPPED | OUTPATIENT
Start: 2021-03-26

## 2021-03-26 RX ORDER — NAPROXEN 250 MG/1
250 TABLET ORAL 2 TIMES DAILY WITH MEALS
Qty: 20 TABLET | Refills: 1 | Status: SHIPPED | OUTPATIENT
Start: 2021-03-26 | End: 2021-04-09

## 2021-03-26 NOTE — PROGRESS NOTES
03/26/2021    CC: Tingling (left leg) and Nicotine Dependence (discuss options)  .        HPI  Leg Pain   The incident occurred more than 1 week ago. The incident occurred at home. There was no injury mechanism. The pain is present in the left leg. The quality of the pain is described as cramping. The pain is at a severity of 3/10.        Subjective Yolanda L Hatchett is a 61 y.o. female.      The following portions of the patient's history were reviewed and updated as appropriate: allergies, current medications, past family history, past medical history, past social history, past surgical history and problem list.    Problem List  Patient Active Problem List   Diagnosis   • Lung cancer, upper lobe (CMS/[facility])   • COPD (chronic obstructive pulmonary disease) (CMS/[facility])   • Pulmonary nodule, left   • Long-term current use of drug therapy for attention deficit hyperactivity disorder (ADHD)   • Abnormal ECG   • Adenocarcinoma of lung (CMS/HCC)   • Airway hyperreactivity   • Arthritis   • Chest pain   • Cough   • Degeneration of lumbosacral intervertebral disc   • Herniation of intervertebral disc   • Low back pain   • Neck pain       Past Medical History  Past Medical History:   Diagnosis Date   • Asthma    • Bleeding of blood vessel 01/05/2011    BRAIN   • Carpal tunnel syndrome on right    • COPD (chronic obstructive pulmonary disease) (CMS/[facility])     MODERATE   • DDD (degenerative disc disease), cervical 09/22/2014    MODERATE C6-C7, SEEING DR. EWING   • Emphysema of lung (CMS/[facility])    • Fibroids     UTERINE X 2   • Hypertension    • MI (myocardial infarction) (CMS/[facility]) 06/2009   • Mitral insufficiency    • Non-small cell carcinoma of lung (CMS/[facility])     Stabe IIA   • Ovarian cyst     RIGHT   • Pain of right upper extremity 11/18/2015   • Pelvic pain    • Right shoulder pain    • SOB (shortness of breath)    • Thyroid nodule    • Vertigo 05/13/2015   • Weight loss        Surgical History  Past Surgical History:    Procedure Laterality Date   • APPENDECTOMY     • CATARACT EXTRACTION, BILATERAL  03/2019   • LUNG LOBECTOMY Right 2012    upper lobectomy and lymphadenectomy       Family History  Family History   Problem Relation Age of Onset   • Cancer Mother 47        Brain/lungs   • Brain cancer Father        Social History  Social History    Tobacco Use      Smoking status: Current Every Day Smoker        Packs/day: 0.50        Years: 20.00        Pack years: 10        Types: Cigarettes      Smokeless tobacco: Current User      Tobacco comment: DECREASED ABOUT TO 1/3 PPD ON 4-26-12       Is the Patient a current tobacco user? No    Allergies  Allergies   Allergen Reactions   • No Known Drug Allergy Unknown - Low Severity       Current Medications    Current Outpatient Medications:   •  albuterol sulfate HFA (Ventolin HFA) 108 (90 Base) MCG/ACT inhaler, Inhale 2 puffs Every 4 (Four) Hours As Needed for Wheezing., Disp: 17 g, Rfl: 3  •  naproxen (NAPROSYN) 375 MG tablet, Take 1 tablet twice a day with food, Disp: 40 tablet, Rfl: 0  •  naproxen (Naprosyn) 250 MG tablet, Take 1 tablet by mouth 2 (Two) Times a Day With Meals., Disp: 20 tablet, Rfl: 1     Review of System  Review of Systems   Eyes: Negative.    Respiratory: Negative.    Cardiovascular: Negative.    Gastrointestinal: Negative.    Genitourinary: Negative.      I have reviewed and confirmed the accuracy of the ROS as documented by the MA/LPN/RN Jovany Wiley MD    Vitals:    03/26/21 0856   BP: 110/60   Pulse: 64   Resp: 16   Temp: 97.9 °F (36.6 °C)   SpO2: 99%     Body mass index is 22.5 kg/m².    Objective     Physical Exam  Physical Exam  Cardiovascular:      Rate and Rhythm: Normal rate and regular rhythm.      Pulses: Normal pulses.      Heart sounds: Normal heart sounds.   Pulmonary:      Effort: Pulmonary effort is normal.      Breath sounds: Normal breath sounds.   Musculoskeletal:         General: Normal range of motion.   Skin:     General: Skin is warm.          Assessment/Plan        This newly established patient presents at this time with complaint of tingling in the left leg x1 year.  She relates it is worse over the past month or so.  On closer discussion she actually describes more of a pain or aching in the left hip area.  She relates that she previously had back pain going back to about 15 years where of which she received pain injections from, with pain in the management.  She relates however that this pain is a little different this was after a protracted discussion and protracted questions to narrow this down to this actually being the hip.  She quantifies the pain as 3/10 in intensity.  She is able to ambulate without difficulty.  She is able to conduct her usual activities without problems.    She also complains of acute asthma episode about 3 to 4 days ago.  She relates that her  voices some Clorox on the floor and this caused an acute episode of asthma.  She has had no more problems since that one episode several days ago.    She is currently being treated by Dr. Del Angel oncologist at [facility] for malignant neoplasm of the right upper lobe of the right lung.    I reviewed her labs from 3/11 showing a normal comprehensive metabolic panel profile and a normal CBC.  Examination of the lumbosacral area was unremarkable.  Range of motion of the left leg is entirely within normal limits.  Good strength against resistance was appreciated.    We will get an x-ray of the patient's left hip to evaluate this new onset of hip pain in this patient with a prior history of lung CA.  Etiology of this pain is undetermined at this time.              Diagnoses and all orders for this visit:    1. Moderate persistent asthma without complication (Primary)    2. Left hip pain  -     XR Hip With or Without Pelvis 2 - 3 View Left; Future    3. Malignant neoplasm of upper lobe of right lung (CMS/HCC)    Other orders  -     albuterol sulfate HFA (Ventolin HFA) 108 (90  Base) MCG/ACT inhaler; Inhale 2 puffs Every 4 (Four) Hours As Needed for Wheezing.  Dispense: 17 g; Refill: 3  -     naproxen (Naprosyn) 250 MG tablet; Take 1 tablet by mouth 2 (Two) Times a Day With Meals.  Dispense: 20 tablet; Refill: 1      Plan:  X-ray of the left hip  2.)  Naprosyn 375 mg 1 tab p.o. twice daily with food  3.)  Follow-up in the next several weeks.       Jovany Wiley MD  03/26/2021

## 2021-04-03 ENCOUNTER — HOSPITAL ENCOUNTER (OUTPATIENT)
Dept: GENERAL RADIOLOGY | Facility: HOSPITAL | Age: 61
Discharge: HOME | End: 2021-04-03
Admitting: INTERNAL MEDICINE

## 2021-04-03 DIAGNOSIS — M25.552 LEFT HIP PAIN: ICD-10-CM

## 2021-04-03 PROCEDURE — 73502 X-RAY EXAM HIP UNI 2-3 VIEWS: CPT

## 2021-04-09 ENCOUNTER — OFFICE VISIT (OUTPATIENT)
Dept: FAMILY MEDICINE CLINIC | Facility: CLINIC | Age: 61
End: 2021-04-09

## 2021-04-09 VITALS
TEMPERATURE: 98 F | WEIGHT: 122.6 LBS | OXYGEN SATURATION: 98 % | SYSTOLIC BLOOD PRESSURE: 130 MMHG | RESPIRATION RATE: 16 BRPM | HEART RATE: 78 BPM | BODY MASS INDEX: 22.56 KG/M2 | HEIGHT: 62 IN | DIASTOLIC BLOOD PRESSURE: 80 MMHG

## 2021-04-09 DIAGNOSIS — M47.816 OSTEOARTHRITIS OF LUMBAR SPINE, UNSPECIFIED SPINAL OSTEOARTHRITIS COMPLICATION STATUS: ICD-10-CM

## 2021-04-09 DIAGNOSIS — M54.2 NECK PAIN: Primary | ICD-10-CM

## 2021-04-09 PROCEDURE — 99214 OFFICE O/P EST MOD 30 MIN: CPT | Performed by: INTERNAL MEDICINE

## 2021-04-09 RX ORDER — CYCLOBENZAPRINE HCL 10 MG
10 TABLET ORAL
Qty: 10 TABLET | Refills: 0 | Status: SHIPPED | OUTPATIENT
Start: 2021-04-09 | End: 2021-04-19

## 2021-04-09 NOTE — PROGRESS NOTES
04/09/2021    CC: Neck Pain (right side radiating down shoulder.  some swelling)  .        HPI  Neck Pain   The current episode started more than 1 year ago. The problem occurs intermittently. The problem has been gradually worsening. The pain is present in the right side. The quality of the pain is described as aching. The pain is moderate. The symptoms are aggravated by twisting. The pain is same all the time. She has tried heat for the symptoms.        Subjective Yolanda L Hatchett is a 61 y.o. female.      The following portions of the patient's history were reviewed and updated as appropriate: allergies, current medications, past family history, past medical history, past social history, past surgical history and problem list.    Problem List  Patient Active Problem List   Diagnosis   • Lung cancer, upper lobe (CMS/[facility])   • COPD (chronic obstructive pulmonary disease) (CMS/[facility])   • Pulmonary nodule, left   • Long-term current use of drug therapy for attention deficit hyperactivity disorder (ADHD)   • Abnormal ECG   • Adenocarcinoma of lung (CMS/[facility])   • Airway hyperreactivity   • Arthritis   • Chest pain   • Cough   • Degeneration of lumbosacral intervertebral disc   • Herniation of intervertebral disc   • Low back pain   • Neck pain       Past Medical History  Past Medical History:   Diagnosis Date   • Asthma    • Bleeding of blood vessel 01/05/2011    BRAIN   • Carpal tunnel syndrome on right    • COPD (chronic obstructive pulmonary disease) (CMS/[facility])     MODERATE   • DDD (degenerative disc disease), cervical 09/22/2014    MODERATE C6-C7, SEEING DR. EWING   • Emphysema of lung (CMS/[facility])    • Fibroids     UTERINE X 2   • Hypertension    • MI (myocardial infarction) (CMS/[facility]) 06/2009   • Mitral insufficiency    • Non-small cell carcinoma of lung (CMS/[facility])     Stabe IIA   • Ovarian cyst     RIGHT   • Pain of right upper extremity 11/18/2015   • Pelvic pain    • Right shoulder pain    • SOB (shortness of  breath)    • Thyroid nodule    • Vertigo 05/13/2015   • Weight loss        Surgical History  Past Surgical History:   Procedure Laterality Date   • APPENDECTOMY     • CATARACT EXTRACTION, BILATERAL  03/2019   • LUNG LOBECTOMY Right 2012    upper lobectomy and lymphadenectomy       Family History  Family History   Problem Relation Age of Onset   • Cancer Mother 47        Brain/lungs   • Brain cancer Father        Social History  Social History    Tobacco Use      Smoking status: Current Every Day Smoker        Packs/day: 0.50        Years: 20.00        Pack years: 10        Types: Cigarettes      Smokeless tobacco: Current User      Tobacco comment: DECREASED ABOUT TO 1/3 PPD ON 4-26-12       Is the Patient a current tobacco user? No    Allergies  Allergies   Allergen Reactions   • No Known Drug Allergy Unknown - Low Severity       Current Medications    Current Outpatient Medications:   •  albuterol sulfate HFA (Ventolin HFA) 108 (90 Base) MCG/ACT inhaler, Inhale 2 puffs Every 4 (Four) Hours As Needed for Wheezing., Disp: 17 g, Rfl: 3  •  naproxen (NAPROSYN) 375 MG tablet, Take 1 tablet twice a day with food, Disp: 40 tablet, Rfl: 0  •  cyclobenzaprine (FLEXERIL) 10 MG tablet, Take 1 tablet by mouth every night at bedtime for 10 days., Disp: 10 tablet, Rfl: 0     Review of System  Review of Systems   Eyes: Negative.    Respiratory: Negative.    Cardiovascular: Negative.    Gastrointestinal: Negative.    Endocrine: Negative.    Musculoskeletal: Positive for neck pain.     I have reviewed and confirmed the accuracy of the ROS as documented by the MA/LPN/RN Jovany Wiley MD    Vitals:    04/09/21 1340   BP: 130/80   Pulse: 78   Resp: 16   Temp: 98 °F (36.7 °C)   SpO2: 98%     Body mass index is 22.42 kg/m².    Objective     Physical Exam  Physical Exam  Neck:      Comments: Right trapezial muscle spasm  Cardiovascular:      Rate and Rhythm: Normal rate and regular rhythm.   Pulmonary:      Effort: Pulmonary effort  is normal.      Breath sounds: Normal breath sounds.   Musculoskeletal:      Cervical back: Tenderness present.         Assessment/Plan        This patient presents for follow-up of left hip pain seen about 2 visits ago.  She related that that pain has been rapid in onset.  A review of the x-rays done shows that there was no evidence of any degenerative joint disease or fracture in the hip although there is evidence of degenerative joint disease in the lower lumbar spine.    The patient relates now that she has had a reoccurrence of neck pain that she has is been present for greater than 5 years.  She relates that she was getting gabapentin and other treatment through [facility] [place] several years ago but she relates that she ran out of insurance and so she stopped getting treatments.  She relates the pain has always been there but it is becoming more severe now.  She has not had treatment for greater than 2 years.  She describes the pain as moderate in intensity aching without radiation.  Evaluation shows right trapezial spasm.  Range of motion of the neck is inhibited secondary to pain.  Decreased range of motion is appreciated in active and passive elements.  This is all secondary to pain.    Review of the patient's MRI done back and 4/15/2019 indicated the patient had intervertebral disc prolapse.      Diagnoses and all orders for this visit:    1. Neck pain (Primary)  -     MRI Cervical Spine Without Contrast; Future  -     cyclobenzaprine (FLEXERIL) 10 MG tablet; Take 1 tablet by mouth every night at bedtime for 10 days.  Dispense: 10 tablet; Refill: 0    2. Osteoarthritis of lumbar spine, unspecified spinal osteoarthritis complication status         Plan:  1.)  MRI of the C-spine  2.)  Flexeril 10 mg 1 tab p.o. nightly for trapezial spasm  3.)  Follow-up in 2 to 3 weeks    Jovany Wiley MD  04/09/2021

## 2021-04-27 ENCOUNTER — TELEPHONE (OUTPATIENT)
Dept: FAMILY MEDICINE CLINIC | Facility: CLINIC | Age: 61
End: 2021-04-27

## 2021-04-27 NOTE — TELEPHONE ENCOUNTER
Left message giving the patient [employer] Sched[facility] phone number.  The order is in her chart, but may need a pre-cert.

## 2021-04-27 NOTE — TELEPHONE ENCOUNTER
Caller: Hatchett, Yolanda L    Relationship: Self    Best call back number: [phone]    What orders are you requesting (i.e. lab or imaging): MRI ON RIGHT NECK AND SHOULDER    In what timeframe would the patient need to come in: DOESN'T MAKE A DIFFERENCE     Where will you receive your lab/imaging services: [facility]     Additional notes:     PATIENT WENT TO [facility] AND THEY DON'T HAVE ACTIVE ORDERS FOR THE MRI IMAGING ON THE PATIENT.

## 2021-05-08 ENCOUNTER — IMMUNIZATION (OUTPATIENT)
Dept: VACCINE CLINIC | Facility: HOSPITAL | Age: 61
End: 2021-05-08

## 2021-05-08 ENCOUNTER — APPOINTMENT (OUTPATIENT)
Dept: VACCINE CLINIC | Facility: HOSPITAL | Age: 61
End: 2021-05-08

## 2021-05-08 PROCEDURE — 0001A: CPT | Performed by: INTERNAL MEDICINE

## 2021-05-08 PROCEDURE — 91300 HC SARSCOV02 VAC 30MCG/0.3ML IM: CPT | Performed by: INTERNAL MEDICINE

## 2021-05-17 ENCOUNTER — HOSPITAL ENCOUNTER (OUTPATIENT)
Dept: PET IMAGING | Facility: HOSPITAL | Age: 61
Discharge: HOME | End: 2021-05-17
Admitting: INTERNAL MEDICINE

## 2021-05-17 DIAGNOSIS — C34.11 MALIGNANT NEOPLASM OF UPPER LOBE OF RIGHT LUNG (HCC): ICD-10-CM

## 2021-05-17 PROCEDURE — 71271 CT THORAX LUNG CANCER SCR C-: CPT

## 2021-05-18 DIAGNOSIS — C34.11 MALIGNANT NEOPLASM OF UPPER LOBE OF RIGHT LUNG (HCC): Primary | ICD-10-CM

## 2021-05-21 ENCOUNTER — OFFICE VISIT (OUTPATIENT)
Dept: FAMILY MEDICINE CLINIC | Facility: CLINIC | Age: 61
End: 2021-05-21

## 2021-05-21 VITALS
DIASTOLIC BLOOD PRESSURE: 78 MMHG | RESPIRATION RATE: 16 BRPM | BODY MASS INDEX: 23.37 KG/M2 | HEART RATE: 75 BPM | WEIGHT: 127 LBS | HEIGHT: 62 IN | SYSTOLIC BLOOD PRESSURE: 112 MMHG | OXYGEN SATURATION: 100 %

## 2021-05-21 DIAGNOSIS — Z85.118 HISTORY OF LUNG CANCER IN ADULTHOOD: ICD-10-CM

## 2021-05-21 DIAGNOSIS — M54.2 NECK PAIN: ICD-10-CM

## 2021-05-21 DIAGNOSIS — M47.812 SPONDYLOSIS OF CERVICAL REGION WITHOUT MYELOPATHY OR RADICULOPATHY: Primary | ICD-10-CM

## 2021-05-21 PROCEDURE — 99214 OFFICE O/P EST MOD 30 MIN: CPT | Performed by: INTERNAL MEDICINE

## 2021-05-24 ENCOUNTER — OFFICE VISIT (OUTPATIENT)
Dept: ONCOLOGY | Facility: CLINIC | Age: 61
End: 2021-05-24

## 2021-05-24 ENCOUNTER — LAB (OUTPATIENT)
Dept: LAB | Facility: HOSPITAL | Age: 61
End: 2021-05-24

## 2021-05-24 VITALS
SYSTOLIC BLOOD PRESSURE: 123 MMHG | HEIGHT: 62 IN | DIASTOLIC BLOOD PRESSURE: 72 MMHG | TEMPERATURE: 97.5 F | WEIGHT: 127.6 LBS | OXYGEN SATURATION: 98 % | HEART RATE: 87 BPM | BODY MASS INDEX: 23.48 KG/M2 | RESPIRATION RATE: 18 BRPM

## 2021-05-24 DIAGNOSIS — C34.11 MALIGNANT NEOPLASM OF UPPER LOBE OF RIGHT LUNG (HCC): Primary | ICD-10-CM

## 2021-05-24 DIAGNOSIS — C34.11 MALIGNANT NEOPLASM OF UPPER LOBE OF RIGHT LUNG (HCC): ICD-10-CM

## 2021-05-24 LAB
ALBUMIN SERPL-MCNC: 4.3 G/DL (ref 3.5–5.2)
ALBUMIN/GLOB SERPL: 1.2 G/DL (ref 1.1–2.4)
ALP SERPL-CCNC: 84 U/L (ref 38–116)
ALT SERPL W P-5'-P-CCNC: 21 U/L (ref 0–33)
ANION GAP SERPL CALCULATED.3IONS-SCNC: 11.5 MMOL/L (ref 5–15)
AST SERPL-CCNC: 24 U/L (ref 0–32)
BASOPHILS # BLD AUTO: 0.02 10*3/MM3 (ref 0–0.2)
BASOPHILS NFR BLD AUTO: 0.3 % (ref 0–1.5)
BILIRUB SERPL-MCNC: 0.2 MG/DL (ref 0.2–1.2)
BUN SERPL-MCNC: 16 MG/DL (ref 6–20)
BUN/CREAT SERPL: 15.2 (ref 7.3–30)
CALCIUM SPEC-SCNC: 9.4 MG/DL (ref 8.5–10.2)
CHLORIDE SERPL-SCNC: 103 MMOL/L (ref 98–107)
CO2 SERPL-SCNC: 26.5 MMOL/L (ref 22–29)
CREAT SERPL-MCNC: 1.05 MG/DL (ref 0.6–1.1)
DEPRECATED RDW RBC AUTO: 41.3 FL (ref 37–54)
EOSINOPHIL # BLD AUTO: 0.35 10*3/MM3 (ref 0–0.4)
EOSINOPHIL NFR BLD AUTO: 4.9 % (ref 0.3–6.2)
ERYTHROCYTE [DISTWIDTH] IN BLOOD BY AUTOMATED COUNT: 13.7 % (ref 12.3–15.4)
GFR SERPL CREATININE-BSD FRML MDRD: 65 ML/MIN/1.73
GLOBULIN UR ELPH-MCNC: 3.5 GM/DL (ref 1.8–3.5)
GLUCOSE SERPL-MCNC: 100 MG/DL (ref 74–124)
HCT VFR BLD AUTO: 38.1 % (ref 34–46.6)
HGB BLD-MCNC: 13.6 G/DL (ref 12–15.9)
IMM GRANULOCYTES # BLD AUTO: 0.01 10*3/MM3 (ref 0–0.05)
IMM GRANULOCYTES NFR BLD AUTO: 0.1 % (ref 0–0.5)
LYMPHOCYTES # BLD AUTO: 3.32 10*3/MM3 (ref 0.7–3.1)
LYMPHOCYTES NFR BLD AUTO: 46.6 % (ref 19.6–45.3)
MCH RBC QN AUTO: 29.6 PG (ref 26.6–33)
MCHC RBC AUTO-ENTMCNC: 35.7 G/DL (ref 31.5–35.7)
MCV RBC AUTO: 83 FL (ref 79–97)
MONOCYTES # BLD AUTO: 0.46 10*3/MM3 (ref 0.1–0.9)
MONOCYTES NFR BLD AUTO: 6.5 % (ref 5–12)
NEUTROPHILS NFR BLD AUTO: 2.96 10*3/MM3 (ref 1.7–7)
NEUTROPHILS NFR BLD AUTO: 41.6 % (ref 42.7–76)
NRBC BLD AUTO-RTO: 0 /100 WBC (ref 0–0.2)
PLATELET # BLD AUTO: 256 10*3/MM3 (ref 140–450)
PMV BLD AUTO: 8.5 FL (ref 6–12)
POTASSIUM SERPL-SCNC: 3.6 MMOL/L (ref 3.5–4.7)
PROT SERPL-MCNC: 7.8 G/DL (ref 6.3–8)
RBC # BLD AUTO: 4.59 10*6/MM3 (ref 3.77–5.28)
SODIUM SERPL-SCNC: 141 MMOL/L (ref 134–145)
WBC # BLD AUTO: 7.12 10*3/MM3 (ref 3.4–10.8)

## 2021-05-24 PROCEDURE — 80053 COMPREHEN METABOLIC PANEL: CPT

## 2021-05-24 PROCEDURE — 85025 COMPLETE CBC W/AUTO DIFF WBC: CPT

## 2021-05-24 PROCEDURE — 36415 COLL VENOUS BLD VENIPUNCTURE: CPT

## 2021-05-24 PROCEDURE — 99213 OFFICE O/P EST LOW 20 MIN: CPT | Performed by: INTERNAL MEDICINE

## 2021-05-24 RX ORDER — NAPROXEN 250 MG/1
250 TABLET ORAL 2 TIMES DAILY WITH MEALS
Qty: 40 TABLET | Refills: 1 | Status: SHIPPED | OUTPATIENT
Start: 2021-05-24 | End: 2022-01-18

## 2021-05-24 RX ORDER — NAPROXEN 375 MG/1
TABLET ORAL
Qty: 40 TABLET | Refills: 0 | Status: SHIPPED | OUTPATIENT
Start: 2021-05-24 | End: 2022-01-18

## 2021-05-24 NOTE — PROGRESS NOTES
05/21/2021    CC: Neck Pain (follow up.   insurance would not cover the MRI)  .        HPI  Neck Pain   This is a recurrent problem. The current episode started 1 to 4 weeks ago. The problem has been unchanged. The pain is present in the anterior neck. The pain is at a severity of 2/10. Nothing aggravates the symptoms. The pain is same all the time.        Subjective Yolanda L Hatchett is a 61 y.o. female.      The following portions of the patient's history were reviewed and updated as appropriate: allergies, current medications, past family history, past medical history, past social history, past surgical history and problem list.    Problem List  Patient Active Problem List   Diagnosis   • Lung cancer, upper lobe (CMS/[facility])   • COPD (chronic obstructive pulmonary disease) (CMS/[facility])   • Pulmonary nodule, left   • Long-term current use of drug therapy for attention deficit hyperactivity disorder (ADHD)   • Abnormal ECG   • Adenocarcinoma of lung (CMS/HCC)   • Airway hyperreactivity   • Arthritis   • Chest pain   • Cough   • Degeneration of lumbosacral intervertebral disc   • Herniation of intervertebral disc   • Low back pain   • Neck pain       Past Medical History  Past Medical History:   Diagnosis Date   • Asthma    • Bleeding of blood vessel 01/05/2011    BRAIN   • Carpal tunnel syndrome on right    • COPD (chronic obstructive pulmonary disease) (CMS/[facility])     MODERATE   • DDD (degenerative disc disease), cervical 09/22/2014    MODERATE C6-C7, SEEING DR. EWING   • Emphysema of lung (CMS/[facility])    • Fibroids     UTERINE X 2   • Hypertension    • MI (myocardial infarction) (CMS/[facility]) 06/2009   • Mitral insufficiency    • Non-small cell carcinoma of lung (CMS/[facility])     Stabe IIA   • Ovarian cyst     RIGHT   • Pain of right upper extremity 11/18/2015   • Pelvic pain    • Right shoulder pain    • SOB (shortness of breath)    • Thyroid nodule    • Vertigo 05/13/2015   • Weight loss        Surgical History  Past  Surgical History:   Procedure Laterality Date   • APPENDECTOMY     • CATARACT EXTRACTION, BILATERAL  03/2019   • LUNG LOBECTOMY Right 2012    upper lobectomy and lymphadenectomy       Family History  Family History   Problem Relation Age of Onset   • Cancer Mother 47        Brain/lungs   • Brain cancer Father        Social History  Social History    Tobacco Use      Smoking status: Current Every Day Smoker        Packs/day: 0.50        Years: 20.00        Pack years: 10        Types: Cigarettes      Smokeless tobacco: Current User      Tobacco comment: DECREASED ABOUT TO 1/3 PPD ON 4-26-12       Is the Patient a current tobacco user? No    Allergies  Allergies   Allergen Reactions   • No Known Drug Allergy Unknown - Low Severity       Current Medications    Current Outpatient Medications:   •  albuterol sulfate HFA (Ventolin HFA) 108 (90 Base) MCG/ACT inhaler, Inhale 2 puffs Every 4 (Four) Hours As Needed for Wheezing., Disp: 17 g, Rfl: 3  •  naproxen (NAPROSYN) 375 MG tablet, Take 1 tablet twice a day with food, Disp: 40 tablet, Rfl: 0     Review of System  Review of Systems   Eyes: Negative.    Respiratory: Negative.    Cardiovascular: Negative.    Endocrine: Negative.    Musculoskeletal: Positive for neck pain.     I have reviewed and confirmed the accuracy of the ROS as documented by the MA/LPN/RN Jovany Wiley MD    Vitals:    05/21/21 1335   BP: 112/78   Pulse: 75   Resp: 16   SpO2: 100%     Body mass index is 23.23 kg/m².    Objective     Physical Exam  Physical Exam  Cardiovascular:      Rate and Rhythm: Normal rate and regular rhythm.      Heart sounds: Normal heart sounds.   Pulmonary:      Breath sounds: Normal breath sounds.   Musculoskeletal:      Cervical back: Normal range of motion and neck supple.         Assessment/Plan      This 61-year-old presents at this time for follow-up of neck pain.  She related that she had run out of insurance for several years and during this period of time she had  no complaint about her neck pain.  However she relates that a few months ago she started noticing pain in her neck again a similar to pain she had had for 7 to 10 years ago.  A review of her records showed a CT scan of the neck in 2016 showed multilevel degenerative joint disease.  With her last visit we had given her some Flexeril to be taken 1 tab p.o. nightly she related this was not effective in alleviating the pain.  Note is made that range of motion of the upper extremities is entirely within normal limits and range of motion of the neck is within normal limits as well.  She has a prior history of lung cancer and has been followed by oncology with Dr. Burns..  Chest x-ray of 2/21 showed no evidence of the previous lung cancer.    We ordered a MRI to evaluate her C-spine pain however insurance did not want to pay for it.  Patient is not willing to pay from her own pocket at this point.  We will get a CT scan of the neck to follow-up from the prior CT scan done 6 years ago.    We will also add Naprosyn 375 mg for the presumptive degenerative joint disease.        Diagnoses and all orders for this visit:    1. Spondylosis of cervical region without myelopathy or radiculopathy (Primary)    2. Neck pain  -     CT cervical spine wo contrast; Future    3. History of lung cancer in adulthood      Plan:  1.)  Naprosyn 375 mg 1 tab p.o. twice daily with food  2.)  CT scan of the neck to evaluate degenerative joint disease comparison to film of 2016.  3.)  Follow-up in the next several weeks to months.       Jovany Wiley MD  05/21/2021

## 2021-05-29 ENCOUNTER — IMMUNIZATION (OUTPATIENT)
Dept: VACCINE CLINIC | Facility: HOSPITAL | Age: 61
End: 2021-05-29

## 2021-05-29 PROCEDURE — 91300 HC SARSCOV02 VAC 30MCG/0.3ML IM: CPT | Performed by: INTERNAL MEDICINE

## 2021-05-29 PROCEDURE — 0002A: CPT | Performed by: INTERNAL MEDICINE

## 2021-06-05 ENCOUNTER — HOSPITAL ENCOUNTER (OUTPATIENT)
Dept: GENERAL RADIOLOGY | Facility: HOSPITAL | Age: 61
Discharge: HOME | End: 2021-06-05
Admitting: INTERNAL MEDICINE

## 2021-06-05 DIAGNOSIS — C34.11 MALIGNANT NEOPLASM OF UPPER LOBE OF RIGHT LUNG (HCC): ICD-10-CM

## 2021-06-05 PROCEDURE — 71046 X-RAY EXAM CHEST 2 VIEWS: CPT

## 2021-06-25 ENCOUNTER — HOSPITAL ENCOUNTER (OUTPATIENT)
Dept: CT IMAGING | Facility: HOSPITAL | Age: 61
Discharge: HOME | End: 2021-06-25
Admitting: INTERNAL MEDICINE

## 2021-06-25 DIAGNOSIS — M54.2 NECK PAIN: ICD-10-CM

## 2021-06-25 PROCEDURE — 72125 CT NECK SPINE W/O DYE: CPT

## 2021-07-01 ENCOUNTER — OFFICE VISIT (OUTPATIENT)
Dept: FAMILY MEDICINE CLINIC | Facility: CLINIC | Age: 61
End: 2021-07-01

## 2021-07-01 VITALS
HEIGHT: 62 IN | SYSTOLIC BLOOD PRESSURE: 124 MMHG | DIASTOLIC BLOOD PRESSURE: 78 MMHG | BODY MASS INDEX: 23.19 KG/M2 | WEIGHT: 126 LBS | RESPIRATION RATE: 16 BRPM

## 2021-07-01 DIAGNOSIS — M54.2 NECK PAIN: Primary | ICD-10-CM

## 2021-07-01 DIAGNOSIS — Z12.31 ENCOUNTER FOR MAMMOGRAM TO ESTABLISH BASELINE MAMMOGRAM: ICD-10-CM

## 2021-07-01 DIAGNOSIS — Z12.11 ENCOUNTER FOR SCREENING COLONOSCOPY: ICD-10-CM

## 2021-07-01 PROCEDURE — 99214 OFFICE O/P EST MOD 30 MIN: CPT | Performed by: INTERNAL MEDICINE

## 2021-07-01 NOTE — PROGRESS NOTES
07/01/2021    CC: Neck Pain (FOLLOW UP...no other issues)  .        HPI  Neck Pain   This is a chronic problem. The current episode started more than 1 year ago. The problem occurs daily. The problem has been waxing and waning. The pain is associated with nothing. The pain is present in the anterior neck. The quality of the pain is described as aching and shooting. The pain is at a severity of 8/10.        Subjective Yolanda L Hatchett is a 61 y.o. female.      The following portions of the patient's history were reviewed and updated as appropriate: allergies, current medications, past family history, past medical history, past social history, past surgical history and problem list.    Problem List  Patient Active Problem List   Diagnosis   • Lung cancer, upper lobe (CMS/HCC)   • COPD (chronic obstructive pulmonary disease) (CMS/[facility])   • Pulmonary nodule, left   • Long-term current use of drug therapy for attention deficit hyperactivity disorder (ADHD)   • Abnormal ECG   • Adenocarcinoma of lung (CMS/HCC)   • Airway hyperreactivity   • Arthritis   • Chest pain   • Cough   • Degeneration of lumbosacral intervertebral disc   • Herniation of intervertebral disc   • Low back pain   • Neck pain       Past Medical History  Past Medical History:   Diagnosis Date   • Asthma    • Bleeding of blood vessel 01/05/2011    BRAIN   • Carpal tunnel syndrome on right    • COPD (chronic obstructive pulmonary disease) (CMS/[facility])     MODERATE   • DDD (degenerative disc disease), cervical 09/22/2014    MODERATE C6-C7, SEEING DR. EWING   • Emphysema of lung (CMS/[facility])    • Fibroids     UTERINE X 2   • Hypertension    • MI (myocardial infarction) (CMS/[facility]) 06/2009   • Mitral insufficiency    • Non-small cell carcinoma of lung (CMS/HCC)     Stabe IIA   • Ovarian cyst     RIGHT   • Pain of right upper extremity 11/18/2015   • Pelvic pain    • Right shoulder pain    • SOB (shortness of breath)    • Thyroid nodule    • Vertigo 05/13/2015    • Weight loss        Surgical History  Past Surgical History:   Procedure Laterality Date   • APPENDECTOMY     • CATARACT EXTRACTION, BILATERAL  03/2019   • LUNG LOBECTOMY Right 2012    upper lobectomy and lymphadenectomy       Family History  Family History   Problem Relation Age of Onset   • Cancer Mother 47        Brain/lungs   • Brain cancer Father        Social History  Social History    Tobacco Use      Smoking status: Current Every Day Smoker        Packs/day: 0.50        Years: 20.00        Pack years: 10        Types: Cigarettes      Smokeless tobacco: Current User      Tobacco comment: DECREASED ABOUT TO 1/3 PPD ON 4-26-12       Is the Patient a current tobacco user? No    Allergies  Allergies   Allergen Reactions   • No Known Drug Allergy Unknown - Low Severity       Current Medications    Current Outpatient Medications:   •  albuterol sulfate HFA (Ventolin HFA) 108 (90 Base) MCG/ACT inhaler, Inhale 2 puffs Every 4 (Four) Hours As Needed for Wheezing., Disp: 17 g, Rfl: 3  •  naproxen (Naprosyn) 250 MG tablet, Take 1 tablet by mouth 2 (Two) Times a Day With Meals., Disp: 40 tablet, Rfl: 1  •  naproxen (NAPROSYN) 375 MG tablet, Take 1 tablet twice a day with food, Disp: 40 tablet, Rfl: 0     Review of System  Review of Systems   Constitutional: Negative.    Respiratory: Negative.    Cardiovascular: Negative.    Endocrine: Negative.    Musculoskeletal: Positive for neck pain.   Neurological: Negative.      I have reviewed and confirmed the accuracy of the ROS as documented by the MA/LPN/RN Jovany Wiley MD    Vitals:    07/01/21 0830   BP: 124/78   Resp: 16     Body mass index is 23.05 kg/m².    Objective     Physical Exam  Physical Exam  Cardiovascular:      Rate and Rhythm: Normal rate and regular rhythm.      Pulses: Normal pulses.      Heart sounds: Normal heart sounds.   Musculoskeletal:         General: Normal range of motion.      Cervical back: Normal range of motion and neck supple.          Assessment/Plan      This 61-year-old patient presents at this time for follow-up of neck pain.  She has had a long history of neck discomfort in fact was seen several years ago by [facility] pain management for neck pain.  She recently had a CT scan to evaluate the continued neck pain and this shows that there was no evidence of any metastasis however it was recommended that an MRI be done as it was more sensitive.  We note that we originally ordered an MRI of the neck C-spine area however her insurance company declined to pay for it.  Patient characterizes the pain at rest at 5/10 in intensity but at its height is 10/10.  She was given Naprosyn 1 tablet twice a day but she relates that this is not very effective.        We will make a referral to [facility] for reevaluation consideration of nerve blocks.  We will also schedule her again for an MRI of the C-spine.  We will make appropriate referrals for colonoscopy and mammograms which are needed.      Diagnoses and all orders for this visit:    1. Neck pain (Primary)    2. Encounter for mammogram to establish baseline mammogram    3. Encounter for screening colonoscopy    Other orders  -     Mammo Diagnostic Bilateral With CAD; Future  -     Ambulatory Referral to Colorectal Surgery      Plan:  1.)  MRI of the C-spine to better evaluate neck pain  2.)  Schedule for mammogram and referral to Dr. Sousa for colonoscopy note the patient has not had a colonoscopy in her history.  She is 61 years of age.  3.)  Follow-up in 2 months.  4.)  Continue Naprosyn 1 tablet twice a day with food.       Jovany Wiley MD  07/01/2021

## 2021-07-09 ENCOUNTER — TELEPHONE (OUTPATIENT)
Dept: FAMILY MEDICINE CLINIC | Facility: CLINIC | Age: 61
End: 2021-07-09

## 2021-07-09 DIAGNOSIS — Z12.31 ENCOUNTER FOR SCREENING MAMMOGRAM FOR BREAST CANCER: Primary | ICD-10-CM

## 2021-07-09 NOTE — TELEPHONE ENCOUNTER
Caller: HANY    Relationship:  SCHEDULING    What orders are you requesting (i.e. lab or imaging): ULTRASOUND    Additional notes: SHE STATE THAT THE REFERRAL FOR THE DIAGNOSTIC MAMMOGRAM IS MISSING AN ORDER FOR THE ULTRASOUND

## 2021-07-15 ENCOUNTER — OFFICE VISIT (OUTPATIENT)
Dept: OBSTETRICS AND GYNECOLOGY | Age: 61
End: 2021-07-15

## 2021-07-15 VITALS
WEIGHT: 128 LBS | SYSTOLIC BLOOD PRESSURE: 124 MMHG | DIASTOLIC BLOOD PRESSURE: 74 MMHG | BODY MASS INDEX: 22.68 KG/M2 | HEIGHT: 63 IN

## 2021-07-15 DIAGNOSIS — R10.2 PELVIC PAIN: ICD-10-CM

## 2021-07-15 DIAGNOSIS — Z01.419 ENCOUNTER FOR GYNECOLOGICAL EXAMINATION WITHOUT ABNORMAL FINDING: Primary | ICD-10-CM

## 2021-07-15 DIAGNOSIS — Z11.51 SCREENING FOR HPV (HUMAN PAPILLOMAVIRUS): ICD-10-CM

## 2021-07-15 DIAGNOSIS — Z12.4 SCREENING FOR CERVICAL CANCER: ICD-10-CM

## 2021-07-15 PROCEDURE — 99386 PREV VISIT NEW AGE 40-64: CPT | Performed by: OBSTETRICS & GYNECOLOGY

## 2021-07-15 NOTE — PROGRESS NOTES
Routine Annual Visit    7/15/2021    Patient: Yolanda L Hatchett          MR#:[id number]      Chief Complaint   Patient presents with   • Gynecologic Exam     New gyn: Hx: rt.ovarian cyst,increasing pain,last pap 2+yrs,.mammo appt.10/21,no colonoscopy       History of Present Illness    61 y.o. female  who presents for annual exam.   Left sided pain , low level, most days  History of right ovarian cyst per pt report  No GI sx  No bleeding  Due for pap.  mammo scheduled this fall  Referred by primary care    Pt with lung cancer, status post treatment  Smoker 1/2 ppd          No LMP recorded (lmp unknown). Patient is postmenopausal.  Obstetric History:  OB History        2    Para   2    Term   2            AB        Living   2       SAB        TAB        Ectopic        Molar        Multiple        Live Births   2               Menstrual History:     No LMP recorded (lmp unknown). Patient is postmenopausal.       Sexual History:       ________________________________________  Patient Active Problem List   Diagnosis   • Lung cancer, upper lobe (CMS/[facility])   • COPD (chronic obstructive pulmonary disease) (CMS/HCC)   • Pulmonary nodule, left   • Long-term current use of drug therapy for attention deficit hyperactivity disorder (ADHD)   • Abnormal ECG   • Adenocarcinoma of lung (CMS/HCC)   • Airway hyperreactivity   • Arthritis   • Chest pain   • Cough   • Degeneration of lumbosacral intervertebral disc   • Herniation of intervertebral disc   • Low back pain   • Neck pain       Past Medical History:   Diagnosis Date   • Asthma    • Bleeding of blood vessel 2011    BRAIN   • Carpal tunnel syndrome on right    • COPD (chronic obstructive pulmonary disease) (CMS/HCC)     MODERATE   • DDD (degenerative disc disease), cervical 2014    MODERATE C6-C7, SEEING DR. EWING   • Emphysema of lung (CMS/HCC)    • Fibroids     UTERINE X 2   • Hypertension    • MI (myocardial infarction) (CMS/HCC) 2009  "  • Mitral insufficiency    • Non-small cell carcinoma of lung (CMS/HCC)     Stabe IIA   • Ovarian cyst     RIGHT   • Pain of right upper extremity 11/18/2015   • Pelvic pain    • Right shoulder pain    • SOB (shortness of breath)    • Thyroid nodule    • Vertigo 05/13/2015   • Weight loss        Past Surgical History:   Procedure Laterality Date   • APPENDECTOMY     • CATARACT EXTRACTION, BILATERAL  03/2019   • LUNG LOBECTOMY Right 2012    upper lobectomy and lymphadenectomy       Social History     Tobacco Use   Smoking Status Current Every Day Smoker   • Packs/day: 0.50   • Years: 20.00   • Pack years: 10.00   • Types: Cigarettes   Smokeless Tobacco Current User   Tobacco Comment    DECREASED ABOUT TO 1/3 PPD ON 4-26-12       has a current medication list which includes the following prescription(s): albuterol sulfate hfa, naproxen, and naproxen.  ________________________________________    Current contraception: post menopausal status  History of abnormal Pap smear: no  Family history of Breast cancer: no  Family history of uterine or ovarian cancer: no  Family History of colon cancer/colon polyps: no  History of abnormal mammogram: no      The following portions of the patient's history were reviewed and updated as appropriate: allergies, current medications, past family history, past medical history, past social history, past surgical history and problem list.    Review of Systems    Pertinent items are noted in HPI.     Objective   Physical Exam    /74   Ht 158.8 cm (62.5\")   Wt 58.1 kg (128 lb)   LMP  (LMP Unknown)   Breastfeeding No   BMI 23.04 kg/m²    BP Readings from Last 3 Encounters:   07/15/21 124/74   07/01/21 124/78   05/24/21 123/72      Wt Readings from Last 3 Encounters:   07/15/21 58.1 kg (128 lb)   07/01/21 57.2 kg (126 lb)   05/24/21 57.9 kg (127 lb 9.6 oz)      BMI: Estimated body mass index is 23.04 kg/m² as calculated from the following:    Height as of this encounter: 158.8 cm " "(62.5\").    Weight as of this encounter: 58.1 kg (128 lb).      General:   alert, appears stated age and cooperative   Abdomen: soft, non-tender, without masses or organomegaly   Breast: inspection negative, no nipple discharge or bleeding, no masses or nodularity palpable   Vulva: normal   Vagina: normal mucosa   Cervix: no cervical motion tenderness and no lesions   Uterus: normal size, mobile and non-tender   Adnexa: no mass, fullness, tenderness     Assessment:    1. Normal annual exam   Assessment     ICD-10-CM ICD-9-CM   1. Encounter for gynecological examination without abnormal finding  Z01.419 V72.31   2. Screening for cervical cancer  Z12.4 V76.2   3. Screening for HPV (human papillomavirus)  Z11.51 V73.81   4. Pelvic pain  R10.2 [id number]     Plan:    Plan     []  Mammogram request made  [x]  PAP done  []  Labs:   []  GC/Chl/TV  []  DEXA scan   []  Referral for colonoscopy:       Diagnoses and all orders for this visit:    1. Encounter for gynecological examination without abnormal finding (Primary)    2. Screening for cervical cancer  -     IGP, Apt HPV,rfx 16 / 18,45    3. Screening for HPV (human papillomavirus)  -     IGP, Apt HPV,rfx 16 / 18,45    4. Pelvic pain      Exam negative today, will schedule GYN US        Counseling:  --Nutrition: Stressed importance of moderation and caloric balance, stressed fresh fruit and vegetables  --Exercise: Stressed the importance of regular exercise. 3-5 times weekly   - Discussed screening mammogram recommendations.   --Discussed benefits of screening colonoscopy- age 45 unless FH  --Discussed pap smear screening recommendations  "

## 2021-07-19 LAB
CYTOLOGIST CVX/VAG CYTO: NORMAL
CYTOLOGY CVX/VAG DOC CYTO: NORMAL
CYTOLOGY CVX/VAG DOC THIN PREP: NORMAL
DX ICD CODE: NORMAL
HIV 1 & 2 AB SER-IMP: NORMAL
HPV I/H RISK 4 DNA CVX QL PROBE+SIG AMP: NEGATIVE
OTHER STN SPEC: NORMAL
STAT OF ADQ CVX/VAG CYTO-IMP: NORMAL

## 2021-08-02 ENCOUNTER — APPOINTMENT (OUTPATIENT)
Dept: MRI IMAGING | Facility: HOSPITAL | Age: 61
End: 2021-08-02

## 2021-10-11 ENCOUNTER — HOSPITAL ENCOUNTER (OUTPATIENT)
Dept: MAMMOGRAPHY | Facility: HOSPITAL | Age: 61
Discharge: HOME | End: 2021-10-11
Admitting: INTERNAL MEDICINE

## 2021-10-11 ENCOUNTER — HOSPITAL ENCOUNTER (OUTPATIENT)
Dept: ULTRASOUND IMAGING | Facility: HOSPITAL | Age: 61
Discharge: HOME | End: 2021-10-11

## 2021-10-11 DIAGNOSIS — Z12.31 ENCOUNTER FOR MAMMOGRAM TO ESTABLISH BASELINE MAMMOGRAM: ICD-10-CM

## 2021-10-11 PROCEDURE — G0279 TOMOSYNTHESIS, MAMMO: HCPCS

## 2021-10-11 PROCEDURE — 77067 SCR MAMMO BI INCL CAD: CPT

## 2021-10-11 PROCEDURE — 77063 BREAST TOMOSYNTHESIS BI: CPT

## 2021-10-11 PROCEDURE — 77066 DX MAMMO INCL CAD BI: CPT

## 2021-11-04 ENCOUNTER — TELEPHONE (OUTPATIENT)
Dept: ONCOLOGY | Facility: CLINIC | Age: 61
End: 2021-11-04

## 2021-11-04 DIAGNOSIS — R91.1 PULMONARY NODULE, LEFT: ICD-10-CM

## 2021-11-04 DIAGNOSIS — C34.11 MALIGNANT NEOPLASM OF UPPER LOBE OF RIGHT LUNG (HCC): Primary | ICD-10-CM

## 2021-11-04 NOTE — TELEPHONE ENCOUNTER
Per Dr. Burns, he feels that pt should have chest x-ray done prior to MD visit. Informed pt of this and she v/u. Order placed.

## 2021-11-06 ENCOUNTER — HOSPITAL ENCOUNTER (OUTPATIENT)
Dept: GENERAL RADIOLOGY | Facility: HOSPITAL | Age: 61
Discharge: HOME | End: 2021-11-06
Admitting: INTERNAL MEDICINE

## 2021-11-06 DIAGNOSIS — R91.1 PULMONARY NODULE, LEFT: ICD-10-CM

## 2021-11-06 DIAGNOSIS — C34.11 MALIGNANT NEOPLASM OF UPPER LOBE OF RIGHT LUNG (HCC): ICD-10-CM

## 2021-11-06 PROCEDURE — 71046 X-RAY EXAM CHEST 2 VIEWS: CPT

## 2021-11-07 NOTE — PROGRESS NOTES
Today the patient states that she is still having some numbness and pain in her left leg.      Subjective .  Concerned about lung nodule  REASONS FOR FOLLOWUP:    1. Nonsmall cell lung carcinoma, stage IIA (down staged from presumed stage IIIA). The patient received neoadjuvant chemotherapy with carboplatin/Alimta x2. Followup scans showed considerable response. The patient is status post right upper lobectomy and lymphadenectomy (discharged 03/07/2012). Per Thoracic Conference, additional two cycles of adjuvant carboplatin/Alimta was advised and initiated on 04/11/2012. She has since remained in remission.     2.  Chest x-ray February 2004, July 20 2020-left hilar nodule 10 mm reduced to 6 mm    3.  Low-dose CT scan without evidence of recurrent disease 5/17/2021    History of Present Illness    The patient is a 61 y.o. female with the above-mentioned history, who returns the office today for 3-month follow-up.  She remains in observation in regards to her malignancy.  She did undergo surveillance CT scan July 2019 with a left lower lobe nodule, 1.1 cm noted.  She then underwent PET scan 7/24/2019 with no metabolic activity identified.   The patient reports today she overall feels very well.  She does have some chronic shortness of breath on exertion though recovers quickly with rest.  She has been exercising and changed her diet, therefore has lost some weight.  She continues to have a chronic cough with occasional productive clear sputum.  She is attempting to stop smoking, though continues to smoke 1 pack/day.  She was recently prescribed Chantix by pulmonology.  She denies any new pain.  The patient is next seen January 2, 2020.  Unfortunately she is no longer covered by insurance though she remains, understandably, concerned about a previously noted left lower lobe pulmonary nodule.  A follow-up scan was scheduled though she is not able to cover that currently financially.      Plans were made for reassessment  and the patient is next seen February 12, 2020 with repeat chest x-ray showing again a 1 cm lung nodule projecting just lateral to the left hilum between the level of the posterior left eighth and ninth ribs.  Fortunately she has not further symptomatic at this point additionally.  A follow-up chest x-ray was requested and obtained July 20, 2020 fortunately demonstrating a reduction in the left hilar nodule from 10 mm now to 6 mm.  She continues to have ongoing back pain indicating that insurance coverage has lapsed.        We elected to obtain a follow-up chest x-ray the patient is seen March 1, 2021.  Fortunately she is feeling well and repeat chest x-ray February 22, 2021 shows pulmonary hyperexpansion.  The previously noted lung nodules not noted left lower lobe or other suspicious lesion.  We have discussed follow-up low-dose CT scanning.    The patient underwent low-dose CT scan 5/17/2021 showing stable fairly dense pulmonary nodule left lower lobe that was thought to be granulomatous.  There were no other findings that might be suspicious for malignancy.  Patient, fortunately is feeling fair except for chronic back pain.  This is being assessed by her primary care physician with additional films.    The patient is reassessed 11/7/2021.  Mammographic screening 10/11/2021 with a negative, chest x-ray was performed 11/6/2021 and is negative for new abnormalities.  Patient states that Dr. Wiley implanted MRI of the lumbar spine is reviewed result the patient's current low back pain  Past Medical History:   Diagnosis Date   • Asthma    • Bleeding of blood vessel 01/05/2011    BRAIN   • Carpal tunnel syndrome on right    • COPD (chronic obstructive pulmonary disease) (HCC)     MODERATE   • DDD (degenerative disc disease), cervical 09/22/2014    MODERATE C6-C7, SEEING DR. EWING   • Drug therapy     lung ca   • Emphysema of lung (HCC)    • Fibroids     UTERINE X 2   • Hx of radiation therapy     lung ca   •  Hypertension    • MI (myocardial infarction) (HCC) 06/2009   • Mitral insufficiency    • Non-small cell carcinoma of lung (HCC)     Stabe IIA   • Ovarian cyst     RIGHT   • Pain of right upper extremity 11/18/2015   • Pelvic pain    • Right shoulder pain    • SOB (shortness of breath)    • Thyroid nodule    • Vertigo 05/13/2015   • Weight loss        ONCOLOGIC HISTORY:  (History from previous dates can be found in the separate document.)    Current Outpatient Medications on File Prior to Visit   Medication Sig Dispense Refill   • albuterol sulfate HFA (Ventolin HFA) 108 (90 Base) MCG/ACT inhaler Inhale 2 puffs Every 4 (Four) Hours As Needed for Wheezing. 17 g 3   • naproxen (Naprosyn) 250 MG tablet Take 1 tablet by mouth 2 (Two) Times a Day With Meals. 40 tablet 1   • naproxen (NAPROSYN) 375 MG tablet Take 1 tablet twice a day with food 40 tablet 0     No current facility-administered medications on file prior to visit.       ALLERGIES:     Allergies   Allergen Reactions   • Codeine Irritability       Social History     Socioeconomic History   • Marital status:    • Years of education: College   Tobacco Use   • Smoking status: Current Every Day Smoker     Packs/day: 0.50     Years: 20.00     Pack years: 10.00     Types: Cigarettes   • Smokeless tobacco: Current User   • Tobacco comment: DECREASED ABOUT TO 1/3 PPD ON 4-26-12   Vaping Use   • Vaping Use: Never used   Substance and Sexual Activity   • Alcohol use: Yes     Comment: OCCASIONAL   • Drug use: Yes     Types: Marijuana   • Sexual activity: Defer         Cancer-related family history includes Brain cancer in her father; Cancer (age of onset: 47) in her mother. There is no history of Breast cancer.     I have reviewed the patient's medical history in detail and updated the computerized patient record.    Review of Systems   Constitutional: Negative for chills, fatigue and fever.   HENT: Negative for mouth sores and sore throat.    Eyes: Negative for  "visual disturbance.   Respiratory: Negative for cough, chest tightness, shortness of breath (chronic unchanged) and wheezing.    Cardiovascular: Negative for chest pain and leg swelling.   Gastrointestinal: Negative for abdominal pain, constipation, diarrhea, nausea and vomiting.   Genitourinary: Negative for dysuria and frequency.   Musculoskeletal: Positive for back pain, joint swelling and neck pain.   Neurological: Positive for numbness. Negative for dizziness and weakness.   Hematological: Does not bruise/bleed easily.   Psychiatric/Behavioral: The patient is not nervous/anxious.    All other systems reviewed and are negative.  Review of systems 09/18/2019  unchanged from previous office visit except as updated.       Objective      Vitals:    11/08/21 1455   BP: 115/65   Pulse: 82   Resp: 16   Temp: 97.3 °F (36.3 °C)   TempSrc: Temporal   SpO2: 97%   Weight: 62.4 kg (137 lb 9.6 oz)   Height: 158.8 cm (62.52\")   PainSc: 0-No pain     Current Status 5/24/2021   ECOG score 0       Physical Exam    GENERAL:  female alert and oriented.   SKIN: Warm, dry without rashes, purpura or petechiae.   HEAD: Normocephalic.   EYES: Pupils equal, round and reactive to light. EOMs intact. Conjunctivae normal.   EARS: Hearing intact.   NOSE: Septum midline. No excoriations or nasal discharge.   MOUTH: Tongue is well-papillated; no stomatitis or ulcers. Lips normal.   THROAT: Oropharynx without lesions or exudates.   NECK: Supple with good range of motion; no thyromegaly or masses, no JVD or bruits.   LYMPHATICS: No cervical, supraclavicular adenopathy.   CHEST: Lungs clear to auscultation, prolonged expiratory phase noted bilaterally.   CARDIAC: Regular rate and rhythm without murmurs, rubs or gallops. Bowel sounds present.  ABDOMEN: Soft, nontender with no organomegaly or masses.   EXTREMITIES: No clubbing, cyanosis or edema.   NEUROLOGICAL: No focal neurological deficits. .      RECENT LABS:  Results from last " 7 days   Lab Units 11/08/21  1428   WBC 10*3/mm3 7.38   NEUTROS ABS 10*3/mm3 3.47   HEMOGLOBIN g/dL 13.4   HEMATOCRIT % 38.3   PLATELETS 10*3/mm3 296           CT SCAN OF THE CHEST WITHOUT CONTRAST WITH LOW-DOSE TECHNIQUE 5/17/2021    There is no mediastinal or hilar or axillary lymphadenopathy. Lung  window images show an 8 mm diameter smoothly marginated noncalcified  pulmonary nodule in the posterior aspect of the superior segment of the  left lower lobe near the pleural surface. The nodules unchanged in size  and configuration. It is fairly dense (87 HU) suggesting it may be  partially calcified. The nodule was photopenic on the PET study. This is  most likely a large granuloma. No other pulmonary nodules are  identified. The patient is status post right upper lobectomy. There are  no parenchymal infiltrates or pleural effusions. Images through the  upper abdomen show no significant abnormality.     IMPRESSION:  Benign CT scan of the chest showing a stable fairly dense  pulmonary nodule in the left lower lobe that is most likely a granuloma.  Yearly screening chest CT follow-up is recommended.    PA AND LATERAL CHEST 11/6/2021     CLINICAL HISTORY: Remote history of lung carcinoma.     Compared to the previous chest dated 06/05/2021.     Lungs are well-expanded and appear free of infiltrates or masses. There  are no pleural effusions. The cardiomediastinal silhouette is  unremarkable.     IMPRESSIONS: No evidence of active disease within the chest.              Assessment plan;        1. Nonsmall cell lung carcinoma, stage IIA (down staged from presumed stage IIIA).  Staging MRI of the brain negative. The patient received neoadjuvant chemotherapy with carboplatin/Alimta x2. Followup scans showed considerable response. The patient is status post right upper lobectomy and lymphadenectomy (discharged 03/07/2012). Per Thoracic Conference, additional two cycles of adjuvant carboplatin/Alimta was advised and initiated  on 04/11/2012. She has since remained in remission.    Low-dose screening CT of the chest July 2018 with a 1.1 left lower lobe nodule noted.  Follow-up PET scan the nodule was photopenic.  The patient has a follow-up review now in February 2020 after we have her apply for [facility] [place].  A chest x-ray be requested in 5 weeks to see the physician in 6 weeks.  This proceeded with patient assessed February 12, 2020 with chest x-ray February 4  not significantly changed from previous.  At this point will reassess again at 6 months with MD, chest x-ray CBC and CMP.     This is reassessed September 30, 2020 with a left hilar nodule having dropped from 10 mm to 6 mm.  We will continue reassessment every 6 months.  The patient's follow-up chest x-ray February 22, 2021 is negative for abnormalities though low-dose CT scan is felt reasonable and be scheduled within the next 3 months.  She will be seen formally after the scan is done.  She agrees with this plan and follow-up.  The patient's follow-up low-dose CT chest done in follow-up 5/17/2021 was negative for recurrent disease.  There remains stable fairly dense pulmonary nodule left lower lobe thought to be a granuloma.  The patient had a follow-up chest x-ray 11/6/2021 without new abnormalities.  We discussed a repeat low-dose CT scan but she has an MRI possibly scheduled next several months of the lumbar spine.    2.  COPD.  Currently without symptoms of exacerbation.  She does see pulmonology.    3.  Smoking cessation.  This was once again discussed today.                                                                                         4. Chronic pain. The patient is seen and managed by pain management.         Plan:  *MD, CBC, chest x-ray 6 months.  After review of the studies we may proceed with a follow-up low-dose CT scan of chest.  *Patient agreeable to plan follow-up

## 2021-11-08 ENCOUNTER — LAB (OUTPATIENT)
Dept: LAB | Facility: HOSPITAL | Age: 61
End: 2021-11-08

## 2021-11-08 ENCOUNTER — OFFICE VISIT (OUTPATIENT)
Dept: ONCOLOGY | Facility: CLINIC | Age: 61
End: 2021-11-08

## 2021-11-08 VITALS
RESPIRATION RATE: 16 BRPM | DIASTOLIC BLOOD PRESSURE: 65 MMHG | HEART RATE: 82 BPM | WEIGHT: 137.6 LBS | HEIGHT: 63 IN | BODY MASS INDEX: 24.38 KG/M2 | SYSTOLIC BLOOD PRESSURE: 115 MMHG | TEMPERATURE: 97.3 F | OXYGEN SATURATION: 97 %

## 2021-11-08 DIAGNOSIS — C34.11 MALIGNANT NEOPLASM OF UPPER LOBE OF RIGHT LUNG (HCC): Primary | ICD-10-CM

## 2021-11-08 DIAGNOSIS — C34.11 MALIGNANT NEOPLASM OF UPPER LOBE OF RIGHT LUNG (HCC): ICD-10-CM

## 2021-11-08 LAB
ALBUMIN SERPL-MCNC: 4.4 G/DL (ref 3.5–5.2)
ALBUMIN/GLOB SERPL: 1.3 G/DL
ALP SERPL-CCNC: 79 U/L (ref 39–117)
ALT SERPL W P-5'-P-CCNC: 14 U/L (ref 1–33)
ANION GAP SERPL CALCULATED.3IONS-SCNC: 9.2 MMOL/L (ref 5–15)
AST SERPL-CCNC: 15 U/L (ref 1–32)
BASOPHILS # BLD AUTO: 0.04 10*3/MM3 (ref 0–0.2)
BASOPHILS NFR BLD AUTO: 0.5 % (ref 0–1.5)
BILIRUB SERPL-MCNC: 0.2 MG/DL (ref 0–1.2)
BUN SERPL-MCNC: 15 MG/DL (ref 8–23)
BUN/CREAT SERPL: 15.2 (ref 7–25)
CALCIUM SPEC-SCNC: 9.8 MG/DL (ref 8.6–10.5)
CHLORIDE SERPL-SCNC: 106 MMOL/L (ref 98–107)
CO2 SERPL-SCNC: 25.8 MMOL/L (ref 22–29)
CREAT SERPL-MCNC: 0.99 MG/DL (ref 0.57–1)
DEPRECATED RDW RBC AUTO: 38.4 FL (ref 37–54)
EOSINOPHIL # BLD AUTO: 0.27 10*3/MM3 (ref 0–0.4)
EOSINOPHIL NFR BLD AUTO: 3.7 % (ref 0.3–6.2)
ERYTHROCYTE [DISTWIDTH] IN BLOOD BY AUTOMATED COUNT: 13 % (ref 12.3–15.4)
GFR SERPL CREATININE-BSD FRML MDRD: 69 ML/MIN/1.73
GLOBULIN UR ELPH-MCNC: 3.5 GM/DL
GLUCOSE SERPL-MCNC: 51 MG/DL (ref 65–99)
HCT VFR BLD AUTO: 38.3 % (ref 34–46.6)
HGB BLD-MCNC: 13.4 G/DL (ref 12–15.9)
IMM GRANULOCYTES # BLD AUTO: 0.01 10*3/MM3 (ref 0–0.05)
IMM GRANULOCYTES NFR BLD AUTO: 0.1 % (ref 0–0.5)
LYMPHOCYTES # BLD AUTO: 3.02 10*3/MM3 (ref 0.7–3.1)
LYMPHOCYTES NFR BLD AUTO: 40.9 % (ref 19.6–45.3)
MCH RBC QN AUTO: 28.4 PG (ref 26.6–33)
MCHC RBC AUTO-ENTMCNC: 35 G/DL (ref 31.5–35.7)
MCV RBC AUTO: 81.1 FL (ref 79–97)
MONOCYTES # BLD AUTO: 0.57 10*3/MM3 (ref 0.1–0.9)
MONOCYTES NFR BLD AUTO: 7.7 % (ref 5–12)
NEUTROPHILS NFR BLD AUTO: 3.47 10*3/MM3 (ref 1.7–7)
NEUTROPHILS NFR BLD AUTO: 47.1 % (ref 42.7–76)
NRBC BLD AUTO-RTO: 0 /100 WBC (ref 0–0.2)
PLATELET # BLD AUTO: 296 10*3/MM3 (ref 140–450)
PMV BLD AUTO: 8.5 FL (ref 6–12)
POTASSIUM SERPL-SCNC: 3.7 MMOL/L (ref 3.5–5.2)
PROT SERPL-MCNC: 7.9 G/DL (ref 6–8.5)
RBC # BLD AUTO: 4.72 10*6/MM3 (ref 3.77–5.28)
SODIUM SERPL-SCNC: 141 MMOL/L (ref 136–145)
WBC # BLD AUTO: 7.38 10*3/MM3 (ref 3.4–10.8)

## 2021-11-08 PROCEDURE — 80053 COMPREHEN METABOLIC PANEL: CPT | Performed by: INTERNAL MEDICINE

## 2021-11-08 PROCEDURE — 99213 OFFICE O/P EST LOW 20 MIN: CPT | Performed by: INTERNAL MEDICINE

## 2021-11-08 PROCEDURE — 85025 COMPLETE CBC W/AUTO DIFF WBC: CPT

## 2021-11-08 PROCEDURE — 36415 COLL VENOUS BLD VENIPUNCTURE: CPT

## 2021-11-09 ENCOUNTER — TELEPHONE (OUTPATIENT)
Dept: FAMILY MEDICINE CLINIC | Facility: CLINIC | Age: 61
End: 2021-11-09

## 2021-12-17 ENCOUNTER — TELEPHONE (OUTPATIENT)
Dept: OBSTETRICS AND GYNECOLOGY | Age: 61
End: 2021-12-17

## 2021-12-17 NOTE — TELEPHONE ENCOUNTER
"Pt calls to be scheduled with Dr Christiansen for \"stomach pains\", explained to pt for this she would need to be seen by PCP and per Dr Christiansen visit notes on 07/18/2021 I attempted to schedule pt for GYN U/S pt denies any lower abdominal pains only wants to be seen for the \"stomach pain located under her breasts.\" Please advise  "

## 2021-12-17 NOTE — TELEPHONE ENCOUNTER
Spoke with patient, to relay message. Patient stated that she has an appointment with her PCP on 01/18/21, but that is to far way. Patient stated that she thinks it may be a bowl blockage, she has had them in the past. Stated that her stomach is bloated and hard at the top, that is the same spot and feeling she had a few years ago. Advised patient to go to ER. Patient stated understanding, no other questions at this time.

## 2021-12-17 NOTE — TELEPHONE ENCOUNTER
I can see her Monday but we will need a GYN US that day.  I am looking at pelvic structures so she may want to make primary care appt as well.

## 2021-12-20 ENCOUNTER — APPOINTMENT (OUTPATIENT)
Dept: MRI IMAGING | Facility: HOSPITAL | Age: 61
End: 2021-12-20

## 2022-01-18 ENCOUNTER — OFFICE VISIT (OUTPATIENT)
Dept: FAMILY MEDICINE CLINIC | Facility: CLINIC | Age: 62
End: 2022-01-18

## 2022-01-18 VITALS
SYSTOLIC BLOOD PRESSURE: 120 MMHG | HEIGHT: 63 IN | BODY MASS INDEX: 24.52 KG/M2 | RESPIRATION RATE: 16 BRPM | WEIGHT: 138.4 LBS | DIASTOLIC BLOOD PRESSURE: 70 MMHG

## 2022-01-18 DIAGNOSIS — M54.2 NECK PAIN: Primary | ICD-10-CM

## 2022-01-18 DIAGNOSIS — M54.42 ACUTE LEFT-SIDED LOW BACK PAIN WITH LEFT-SIDED SCIATICA: ICD-10-CM

## 2022-01-18 DIAGNOSIS — R19.06 EPIGASTRIC MASS: ICD-10-CM

## 2022-01-18 PROCEDURE — 99214 OFFICE O/P EST MOD 30 MIN: CPT | Performed by: INTERNAL MEDICINE

## 2022-01-21 ENCOUNTER — TELEPHONE (OUTPATIENT)
Dept: FAMILY MEDICINE CLINIC | Facility: CLINIC | Age: 62
End: 2022-01-21

## 2022-01-21 RX ORDER — CYCLOBENZAPRINE HCL 10 MG
10 TABLET ORAL 2 TIMES DAILY PRN
Qty: 14 TABLET | Refills: 0 | Status: SHIPPED | OUTPATIENT
Start: 2022-01-21 | End: 2022-02-04

## 2022-01-21 RX ORDER — NAPROXEN 250 MG/1
375 TABLET ORAL 2 TIMES DAILY WITH MEALS
Qty: 40 TABLET | Refills: 1 | Status: SHIPPED | OUTPATIENT
Start: 2022-01-21 | End: 2022-06-13 | Stop reason: SDUPTHER

## 2022-01-21 NOTE — TELEPHONE ENCOUNTER
Patient said she went to  her medicine for pain and the pharmacy did not have anything sent over she was calling to see if you could send the prescription over to be filled if you have any question please call  [phone]

## 2022-01-22 NOTE — PROGRESS NOTES
01/18/2022    CC: Edema (upper abdomen.  noticed about 3 wks ago.) and Back Pain (started 1-2 weeks ago)  .        HPI  Back Pain  This is a recurrent problem. The current episode started more than 1 year ago. The problem occurs 2 to 4 times per day. The problem is unchanged. The pain does not radiate.        Subjective   Yolanda Lee Hatchett is a 61 y.o. female.      The following portions of the patient's history were reviewed and updated as appropriate: allergies, current medications, past family history, past medical history, past social history, past surgical history and problem list.    Problem List  Patient Active Problem List   Diagnosis   • Lung cancer, upper lobe (HCC)   • COPD (chronic obstructive pulmonary disease) ([facility])   • Pulmonary nodule, left   • Long-term current use of drug therapy for attention deficit hyperactivity disorder (ADHD)   • Abnormal ECG   • Adenocarcinoma of lung (HCC)   • Airway hyperreactivity   • Arthritis   • Chest pain   • Cough   • Degeneration of lumbosacral intervertebral disc   • Herniation of intervertebral disc   • Low back pain   • Neck pain       Past Medical History  Past Medical History:   Diagnosis Date   • Asthma    • Bleeding of blood vessel 01/05/2011    BRAIN   • Carpal tunnel syndrome on right    • COPD (chronic obstructive pulmonary disease) ([facility])     MODERATE   • DDD (degenerative disc disease), cervical 09/22/2014    MODERATE C6-C7, SEEING DR. EWING   • Drug therapy     lung ca   • Emphysema of lung (HCC)    • Fibroids     UTERINE X 2   • Hx of radiation therapy     lung ca   • Hypertension    • MI (myocardial infarction) ([facility]) 06/2009   • Mitral insufficiency    • Non-small cell carcinoma of lung (HCC)     Stabe IIA   • Ovarian cyst     RIGHT   • Pain of right upper extremity 11/18/2015   • Pelvic pain    • Right shoulder pain    • SOB (shortness of breath)    • Thyroid nodule    • Vertigo 05/13/2015   • Weight loss        Surgical History  Past Surgical  History:   Procedure Laterality Date   • APPENDECTOMY     • CATARACT EXTRACTION, BILATERAL  03/2019   • LUNG LOBECTOMY Right 2012    upper lobectomy and lymphadenectomy       Family History  Family History   Problem Relation Age of Onset   • Cancer Mother 47        Brain/lungs   • Brain cancer Father    • Breast cancer Neg Hx        Social History  Social History    Tobacco Use      Smoking status: Current Every Day Smoker        Packs/day: 0.50        Years: 20.00        Pack years: 10        Types: Cigarettes      Smokeless tobacco: Current User      Tobacco comment: DECREASED ABOUT TO 1/3 PPD ON 4-26-12       Is the Patient a current tobacco user? No    Allergies  Allergies   Allergen Reactions   • Codeine Irritability       Current Medications    Current Outpatient Medications:   •  albuterol sulfate HFA (Ventolin HFA) 108 (90 Base) MCG/ACT inhaler, Inhale 2 puffs Every 4 (Four) Hours As Needed for Wheezing., Disp: 17 g, Rfl: 3  •  cyclobenzaprine (FLEXERIL) 10 MG tablet, Take 1 tablet by mouth 2 (Two) Times a Day As Needed for Muscle Spasms for up to 14 days., Disp: 14 tablet, Rfl: 0  •  naproxen (Naprosyn) 250 MG tablet, Take 1.5 tablets by mouth 2 (Two) Times a Day With Meals., Disp: 40 tablet, Rfl: 1     Review of System  Review of Systems   Constitutional: Negative.    HENT: Negative.    Eyes: Negative.    Respiratory: Negative.    Cardiovascular: Negative.    Gastrointestinal: Negative.    Musculoskeletal: Positive for back pain.     I have reviewed and confirmed the accuracy of the ROS as documented by the MA/LPN/RN Jovany Wiley MD    Vitals:    01/18/22 1108   BP: 120/70   Resp: 16     Body mass index is 24.91 kg/m².    Objective     Physical Exam  Physical Exam  HENT:      Head: Normocephalic and atraumatic.   Eyes:      Extraocular Movements: Extraocular movements intact.      Pupils: Pupils are equal, round, and reactive to light.   Cardiovascular:      Rate and Rhythm: Normal rate and regular  rhythm.      Pulses: Normal pulses.      Heart sounds: Normal heart sounds.   Pulmonary:      Effort: Pulmonary effort is normal.      Breath sounds: Normal breath sounds.   Musculoskeletal:      Cervical back: Normal range of motion.         Assessment/Plan        This 61-year-old patient presents today for follow-up.  With the last visit we ordered an MRI of the neck as she is continue to complain of pain in the neck area.  She relates that her insurance would not pay for this and she is back today to see us.  After discussion with Ninoska our referral coordinator we found that the patient's insurance is somehow keyed to [employer] and that there is some doubt that they will even compensate for the visits we made here in [place].  We discussed with the with the patient and asked her to contact her insurer and make the change to me as a primary care provider and with [place] as being her catchment area.    Previous CT scan of the neck was normal but radiologist suggested an MRI of the area for further evaluation.    Patient has a new complaint today of a swelling in the upper abdomen has been present for about 3 weeks.  She relates it is not tender and indeed there is no tenderness to palpation but a prominence is appreciated that is soft 4 to 5 cm in diameter in the epigastrium.    Patient's blood pressure was well controlled today at 120/80 in the left arm sitting position standard cuff.    She also complains of left lower back pain with radiation down the left leg that was present for about 2 to 3 weeks.  It resolved with the use of ibuprofen until about 2 to 3 days ago when it restarted.  She describes the pain is low-grade aching in the left lumbar sacral area with pain felt down the left lateral leg.        I am concerned about the patient's epigastric mass and we will evaluate this with a CT scan.  Hopefully the patient can get her insurance straightened out so we can evaluate this epigastric mass  and her continued neck pain.  For the new onset of left lumbosacral pain with radiation down the left leg will use Naprosyn and a muscle relaxant for now.  I will reevaluate her in the next few weeks      Diagnoses and all orders for this visit:    1. Neck pain (Primary)    2. Epigastric mass    3. Acute left-sided low back pain with left-sided sciatica      Plan:  1.)  Flexeril 10 mg 1 tab p.o. nightly to start  2.)  Naprosyn 375 mg 1 tab p.o. twice daily with food  3.)  Reevaluation of low back pain in 2 to 3 weeks.       Jovany Wiley MD  01/18/2022

## 2022-02-18 ENCOUNTER — APPOINTMENT (OUTPATIENT)
Dept: CT IMAGING | Facility: HOSPITAL | Age: 62
End: 2022-02-18

## 2022-05-04 ENCOUNTER — DOCUMENTATION (OUTPATIENT)
Dept: ONCOLOGY | Facility: CLINIC | Age: 62
End: 2022-05-04

## 2022-05-04 DIAGNOSIS — C34.11 MALIGNANT NEOPLASM OF UPPER LOBE OF RIGHT LUNG: Primary | ICD-10-CM

## 2022-05-04 NOTE — PROGRESS NOTES
Called pt because she had not had her Chest x-ray done for her visit with Dr. Burns. Pt is currently self pay as [facility] does not take her insurance. She will have to self pay for the chest x-ray also. Mily suggested pt call the financial counselors at [facility] at [phone] to ask about financial assistance. Pt states she is already working on financial assistance. Advised her Dr. Burns said he would see her without a chest x-ray. Informed pt and she v/u.

## 2022-05-05 ENCOUNTER — OFFICE VISIT (OUTPATIENT)
Dept: FAMILY MEDICINE CLINIC | Facility: CLINIC | Age: 62
End: 2022-05-05

## 2022-05-05 VITALS
SYSTOLIC BLOOD PRESSURE: 120 MMHG | BODY MASS INDEX: 25.91 KG/M2 | HEIGHT: 63 IN | RESPIRATION RATE: 16 BRPM | DIASTOLIC BLOOD PRESSURE: 80 MMHG | WEIGHT: 146.2 LBS

## 2022-05-05 DIAGNOSIS — G89.29 CHRONIC PAIN OF RIGHT KNEE: ICD-10-CM

## 2022-05-05 DIAGNOSIS — M25.561 CHRONIC PAIN OF RIGHT KNEE: ICD-10-CM

## 2022-05-05 DIAGNOSIS — M54.2 NECK PAIN: Primary | ICD-10-CM

## 2022-05-05 DIAGNOSIS — R19.06 EPIGASTRIC MASS: ICD-10-CM

## 2022-05-05 PROCEDURE — 99213 OFFICE O/P EST LOW 20 MIN: CPT | Performed by: INTERNAL MEDICINE

## 2022-05-08 NOTE — PROGRESS NOTES
Patient indicates she has had abdominal swelling bilateral hip pain.      Subjective .  REASONS FOR FOLLOWUP:    1. Nonsmall cell lung carcinoma, stage IIA (down staged from presumed stage IIIA). The patient received neoadjuvant chemotherapy with carboplatin/Alimta x2. Followup scans showed considerable response. The patient is status post right upper lobectomy and lymphadenectomy (discharged 03/07/2012). Per Thoracic Conference, additional two cycles of adjuvant carboplatin/Alimta was advised and initiated on 04/11/2012. She has since remained in remission.     2.  Chest x-ray February 2004, July 20 2020-left hilar nodule 10 mm reduced to 6 mm    3.  Low-dose CT scan without evidence of recurrent disease 5/17/2021    4.  Patient seen 5/9/2022 with bilateral hip pain, abdominal swelling        History of Present Illness    The patient is a 62 y.o. female with the above-mentioned history, who returns the office today for 3-month follow-up.  She remains in observation in regards to her malignancy.  She did undergo surveillance CT scan July 2019 with a left lower lobe nodule, 1.1 cm noted.  She then underwent PET scan 7/24/2019 with no metabolic activity identified.   The patient reports today she overall feels very well.  She does have some chronic shortness of breath on exertion though recovers quickly with rest.  She has been exercising and changed her diet, therefore has lost some weight.  She continues to have a chronic cough with occasional productive clear sputum.  She is attempting to stop smoking, though continues to smoke 1 pack/day.  She was recently prescribed Chantix by pulmonology.  She denies any new pain.  The patient is next seen January 2, 2020.  Unfortunately she is no longer covered by insurance though she remains, understandably, concerned about a previously noted left lower lobe pulmonary nodule.  A follow-up scan was scheduled though she is not able to cover that currently financially.      Plans  were made for reassessment and the patient is next seen February 12, 2020 with repeat chest x-ray showing again a 1 cm lung nodule projecting just lateral to the left hilum between the level of the posterior left eighth and ninth ribs.  Fortunately she has not further symptomatic at this point additionally.  A follow-up chest x-ray was requested and obtained July 20, 2020 fortunately demonstrating a reduction in the left hilar nodule from 10 mm now to 6 mm.  She continues to have ongoing back pain indicating that insurance coverage has lapsed.        We elected to obtain a follow-up chest x-ray the patient is seen March 1, 2021.  Fortunately she is feeling well and repeat chest x-ray February 22, 2021 shows pulmonary hyperexpansion.  The previously noted lung nodules not noted left lower lobe or other suspicious lesion.  We have discussed follow-up low-dose CT scanning.    The patient underwent low-dose CT scan 5/17/2021 showing stable fairly dense pulmonary nodule left lower lobe that was thought to be granulomatous.  There were no other findings that might be suspicious for malignancy.  Patient, fortunately is feeling fair except for chronic back pain.  This is being assessed by her primary care physician with additional films.    The patient is reassessed 11/7/2021.  Mammographic screening 10/11/2021 with a negative, chest x-ray was performed 11/6/2021 and is negative for new abnormalities.  Patient states that Dr. Wiley  MRI of the lumbar spine is reviewed result the patient's current low back pain    The patient is next seen, ever, 5/9/2022 indicating that she has been having increasing hip pain as well as abdominal swelling.  Her primary care had endeavor to have her undergo repeat scans that are not covered at [facility] and thus we will try to schedule as an outpatient at a [facility].  Past Medical History:   Diagnosis Date   • Asthma    • Bleeding of blood vessel 01/05/2011    BRAIN    • Carpal tunnel syndrome on right    • COPD (chronic obstructive pulmonary disease) (HCC)     MODERATE   • DDD (degenerative disc disease), cervical 09/22/2014    MODERATE C6-C7, SEEING DR. EWING   • Drug therapy     lung ca   • Emphysema of lung (HCC)    • Fibroids     UTERINE X 2   • Hx of radiation therapy     lung ca   • Hypertension    • MI (myocardial infarction) (HCC) 06/2009   • Mitral insufficiency    • Non-small cell carcinoma of lung (HCC)     Stabe IIA   • Ovarian cyst     RIGHT   • Pain of right upper extremity 11/18/2015   • Pelvic pain    • Right shoulder pain    • SOB (shortness of breath)    • Thyroid nodule    • Vertigo 05/13/2015   • Weight loss        ONCOLOGIC HISTORY:  (History from previous dates can be found in the separate document.)    Current Outpatient Medications on File Prior to Visit   Medication Sig Dispense Refill   • albuterol sulfate HFA (Ventolin HFA) 108 (90 Base) MCG/ACT inhaler Inhale 2 puffs Every 4 (Four) Hours As Needed for Wheezing. 17 g 3   • naproxen (Naprosyn) 250 MG tablet Take 1.5 tablets by mouth 2 (Two) Times a Day With Meals. 40 tablet 1     No current facility-administered medications on file prior to visit.       ALLERGIES:     Allergies   Allergen Reactions   • Codeine Irritability       Social History     Socioeconomic History   • Marital status:    • Years of education: College   Tobacco Use   • Smoking status: Current Every Day Smoker     Packs/day: 0.50     Years: 20.00     Pack years: 10.00     Types: Cigarettes   • Smokeless tobacco: Current User   • Tobacco comment: DECREASED ABOUT TO 1/3 PPD ON 4-26-12   Vaping Use   • Vaping Use: Never used   Substance and Sexual Activity   • Alcohol use: Yes     Comment: OCCASIONAL   • Drug use: Yes     Types: Marijuana   • Sexual activity: Defer         Cancer-related family history includes Brain cancer in her father; Cancer (age of onset: 47) in her mother. There is no history of Breast cancer.     I have  "reviewed the patient's medical history in detail and updated the computerized patient record.    Review of Systems   Constitutional: Negative for chills, fatigue and fever.   HENT: Negative for mouth sores and sore throat.    Eyes: Negative for visual disturbance.   Respiratory: Negative for cough, chest tightness, shortness of breath (chronic unchanged) and wheezing.    Cardiovascular: Negative for chest pain and leg swelling.   Gastrointestinal: Positive for abdominal distention. Negative for abdominal pain, constipation and vomiting.   Genitourinary: Negative for dysuria and frequency.   Musculoskeletal: Positive for back pain, joint swelling and neck pain.   Neurological: Positive for numbness. Negative for dizziness and weakness.   Hematological: Does not bruise/bleed easily.   Psychiatric/Behavioral: The patient is not nervous/anxious.    All other systems reviewed and are negative.  Review of systems 09/18/2019  unchanged from previous office visit except as updated.       Objective      Vitals:    05/09/22 1339   BP: 127/83   Pulse: 94   Resp: 16   Temp: 96.9 °F (36.1 °C)   TempSrc: Temporal   SpO2: 98%   Weight: 65.3 kg (143 lb 14.4 oz)   Height: 158.8 cm (62.52\")   PainSc: 0-No pain     Current Status 5/9/2022   ECOG score 0       Physical Exam    GENERAL:  female alert and oriented.   SKIN: Warm, dry without rashes, purpura or petechiae.   HEAD: Normocephalic.   EYES: Pupils equal, round and reactive to light. EOMs intact. Conjunctivae normal.   EARS: Hearing intact.   NOSE: Septum midline. No excoriations or nasal discharge.   MOUTH: Tongue is well-papillated; no stomatitis or ulcers. Lips normal.   THROAT: Oropharynx without lesions or exudates.   NECK: Supple with good range of motion; no thyromegaly or masses, no JVD or bruits.   LYMPHATICS: No cervical, supraclavicular adenopathy.   CHEST: Lungs clear to auscultation, prolonged expiratory phase noted bilaterally.   CARDIAC: Regular " rate and rhythm without murmurs, rubs or gallops. Bowel sounds present.  ABDOMEN: Soft, nontender with no organomegaly or masses.   EXTREMITIES: No clubbing, cyanosis or edema.   NEUROLOGICAL: No focal neurological deficits. .      RECENT LABS:  Results from last 7 days   Lab Units 05/09/22  1333   WBC 10*3/mm3 8.10   NEUTROS ABS 10*3/mm3 4.17   HEMOGLOBIN g/dL 14.2   HEMATOCRIT % 42.7   PLATELETS 10*3/mm3 309           CT SCAN OF THE CHEST WITHOUT CONTRAST WITH LOW-DOSE TECHNIQUE 5/17/2021    There is no mediastinal or hilar or axillary lymphadenopathy. Lung  window images show an 8 mm diameter smoothly marginated noncalcified  pulmonary nodule in the posterior aspect of the superior segment of the  left lower lobe near the pleural surface. The nodules unchanged in size  and configuration. It is fairly dense (87 HU) suggesting it may be  partially calcified. The nodule was photopenic on the PET study. This is  most likely a large granuloma. No other pulmonary nodules are  identified. The patient is status post right upper lobectomy. There are  no parenchymal infiltrates or pleural effusions. Images through the  upper abdomen show no significant abnormality.     IMPRESSION:  Benign CT scan of the chest showing a stable fairly dense  pulmonary nodule in the left lower lobe that is most likely a granuloma.  Yearly screening chest CT follow-up is recommended.    PA AND LATERAL CHEST 11/6/2021     CLINICAL HISTORY: Remote history of lung carcinoma.     Compared to the previous chest dated 06/05/2021.     Lungs are well-expanded and appear free of infiltrates or masses. There  are no pleural effusions. The cardiomediastinal silhouette is  unremarkable.     IMPRESSIONS: No evidence of active disease within the chest.              Assessment plan;        1. Nonsmall cell lung carcinoma, stage IIA (down staged from presumed stage IIIA).  Staging MRI of the brain negative. The patient received neoadjuvant chemotherapy with  carboplatin/Alimta x2. Followup scans showed considerable response. The patient is status post right upper lobectomy and lymphadenectomy (discharged 03/07/2012). Per Thoracic Conference, additional two cycles of adjuvant carboplatin/Alimta was advised and initiated on 04/11/2012. She has since remained in remission.    Low-dose screening CT of the chest July 2018 with a 1.1 left lower lobe nodule noted.  Follow-up PET scan the nodule was photopenic.  The patient has a follow-up review now in February 2020 after we have her apply for assistance with [facility].  A chest x-ray be requested in 5 weeks to see the physician in 6 weeks.  This proceeded with patient assessed February 12, 2020 with chest x-ray February 4  not significantly changed from previous.  At this point will reassess again at 6 months with MD, chest x-ray CBC and CMP.     This is reassessed September 30, 2020 with a left hilar nodule having dropped from 10 mm to 6 mm.  We will continue reassessment every 6 months.  The patient's follow-up chest x-ray February 22, 2021 is negative for abnormalities though low-dose CT scan is felt reasonable and be scheduled within the next 3 months.  She will be seen formally after the scan is done.  She agrees with this plan and follow-up.  The patient's follow-up low-dose CT chest done in follow-up 5/17/2021 was negative for recurrent disease.  There remains stable fairly dense pulmonary nodule left lower lobe thought to be a granuloma.  The patient had a follow-up chest x-ray 11/6/2021 without new abnormalities.  We discussed a repeat low-dose CT scan but she has an MRI possibly scheduled next several months of the lumbar spine.    Paced rhythm at 5/9/2022 with progressive symptoms of abdominal discomfort and swelling, her physical exam is not particularly abnormal but she is also having a little hip pain and has chronic issues with shortness of breath and cough as well as smoking.  She will need to be  reassessed though we will have her undergo repeat scans at [facility].    2.  COPD.  Currently without symptoms of exacerbation.  She does see pulmonology.    3.  Smoking cessation.  This was once again discussed today.                                              4. Chronic pain. The patient is seen and managed by pain management.         Plan:    *CT chest abdomen pelvis at [facility] in the next several weeks    *MD follow-up 1 to 2 weeks after scans are completed

## 2022-05-09 ENCOUNTER — LAB (OUTPATIENT)
Dept: LAB | Facility: HOSPITAL | Age: 62
End: 2022-05-09

## 2022-05-09 ENCOUNTER — OFFICE VISIT (OUTPATIENT)
Dept: ONCOLOGY | Facility: CLINIC | Age: 62
End: 2022-05-09

## 2022-05-09 VITALS
HEIGHT: 63 IN | DIASTOLIC BLOOD PRESSURE: 83 MMHG | RESPIRATION RATE: 16 BRPM | SYSTOLIC BLOOD PRESSURE: 127 MMHG | WEIGHT: 143.9 LBS | TEMPERATURE: 96.9 F | OXYGEN SATURATION: 98 % | HEART RATE: 94 BPM | BODY MASS INDEX: 25.5 KG/M2

## 2022-05-09 DIAGNOSIS — C34.92 ADENOCARCINOMA OF LEFT LUNG: Primary | ICD-10-CM

## 2022-05-09 DIAGNOSIS — C34.11 MALIGNANT NEOPLASM OF UPPER LOBE OF RIGHT LUNG: ICD-10-CM

## 2022-05-09 LAB
ALBUMIN SERPL-MCNC: 4.4 G/DL (ref 3.5–5.2)
ALBUMIN/GLOB SERPL: 1.1 G/DL (ref 1.1–2.4)
ALP SERPL-CCNC: 88 U/L (ref 38–116)
ALT SERPL W P-5'-P-CCNC: 17 U/L (ref 0–33)
ANION GAP SERPL CALCULATED.3IONS-SCNC: 12.7 MMOL/L (ref 5–15)
AST SERPL-CCNC: 19 U/L (ref 0–32)
BASOPHILS # BLD AUTO: 0.04 10*3/MM3 (ref 0–0.2)
BASOPHILS NFR BLD AUTO: 0.5 % (ref 0–1.5)
BILIRUB SERPL-MCNC: 0.2 MG/DL (ref 0.2–1.2)
BUN SERPL-MCNC: 17 MG/DL (ref 6–20)
BUN/CREAT SERPL: 16.7 (ref 7.3–30)
CALCIUM SPEC-SCNC: 10.3 MG/DL (ref 8.5–10.2)
CHLORIDE SERPL-SCNC: 106 MMOL/L (ref 98–107)
CO2 SERPL-SCNC: 24.3 MMOL/L (ref 22–29)
CREAT SERPL-MCNC: 1.02 MG/DL (ref 0.6–1.1)
DEPRECATED RDW RBC AUTO: 41.9 FL (ref 37–54)
EGFRCR SERPLBLD CKD-EPI 2021: 62.7 ML/MIN/1.73
EOSINOPHIL # BLD AUTO: 0.27 10*3/MM3 (ref 0–0.4)
EOSINOPHIL NFR BLD AUTO: 3.3 % (ref 0.3–6.2)
ERYTHROCYTE [DISTWIDTH] IN BLOOD BY AUTOMATED COUNT: 13.8 % (ref 12.3–15.4)
GLOBULIN UR ELPH-MCNC: 3.9 GM/DL (ref 1.8–3.5)
GLUCOSE SERPL-MCNC: 60 MG/DL (ref 74–124)
HCT VFR BLD AUTO: 42.7 % (ref 34–46.6)
HGB BLD-MCNC: 14.2 G/DL (ref 12–15.9)
IMM GRANULOCYTES # BLD AUTO: 0.02 10*3/MM3 (ref 0–0.05)
IMM GRANULOCYTES NFR BLD AUTO: 0.2 % (ref 0–0.5)
LYMPHOCYTES # BLD AUTO: 2.96 10*3/MM3 (ref 0.7–3.1)
LYMPHOCYTES NFR BLD AUTO: 36.5 % (ref 19.6–45.3)
MCH RBC QN AUTO: 27.8 PG (ref 26.6–33)
MCHC RBC AUTO-ENTMCNC: 33.3 G/DL (ref 31.5–35.7)
MCV RBC AUTO: 83.6 FL (ref 79–97)
MONOCYTES # BLD AUTO: 0.64 10*3/MM3 (ref 0.1–0.9)
MONOCYTES NFR BLD AUTO: 7.9 % (ref 5–12)
NEUTROPHILS NFR BLD AUTO: 4.17 10*3/MM3 (ref 1.7–7)
NEUTROPHILS NFR BLD AUTO: 51.6 % (ref 42.7–76)
NRBC BLD AUTO-RTO: 0 /100 WBC (ref 0–0.2)
PLATELET # BLD AUTO: 309 10*3/MM3 (ref 140–450)
PMV BLD AUTO: 9.1 FL (ref 6–12)
POTASSIUM SERPL-SCNC: 3.9 MMOL/L (ref 3.5–4.7)
PROT SERPL-MCNC: 8.3 G/DL (ref 6.3–8)
RBC # BLD AUTO: 5.11 10*6/MM3 (ref 3.77–5.28)
SODIUM SERPL-SCNC: 143 MMOL/L (ref 134–145)
WBC NRBC COR # BLD: 8.1 10*3/MM3 (ref 3.4–10.8)

## 2022-05-09 PROCEDURE — 36415 COLL VENOUS BLD VENIPUNCTURE: CPT

## 2022-05-09 PROCEDURE — 85025 COMPLETE CBC W/AUTO DIFF WBC: CPT

## 2022-05-09 PROCEDURE — 80053 COMPREHEN METABOLIC PANEL: CPT

## 2022-05-09 PROCEDURE — 99213 OFFICE O/P EST LOW 20 MIN: CPT | Performed by: INTERNAL MEDICINE

## 2022-05-09 NOTE — PROGRESS NOTES
05/05/2022    CC: Back Pain (F/U), Joint Swelling (Right knee.  Started 1 month.  No injury.), and Hip Pain (Left hip.)  .        HPI  Back Pain  This is a chronic problem. The current episode started in the past 7 days. The problem occurs intermittently. The problem is unchanged.        Subjective   Yolanda Lee Hatchett is a 62 y.o. female.      The following portions of the patient's history were reviewed and updated as appropriate: allergies, current medications, past family history, past medical history, past social history, past surgical history and problem list.    Problem List  Patient Active Problem List   Diagnosis   • Lung cancer, upper lobe (HCC)   • COPD (chronic obstructive pulmonary disease) ([facility])   • Pulmonary nodule, left   • Long-term current use of drug therapy for attention deficit hyperactivity disorder (ADHD)   • Abnormal ECG   • Adenocarcinoma of lung (HCC)   • Airway hyperreactivity   • Arthritis   • Chest pain   • Cough   • Degeneration of lumbosacral intervertebral disc   • Herniation of intervertebral disc   • Low back pain   • Neck pain       Past Medical History  Past Medical History:   Diagnosis Date   • Asthma    • Bleeding of blood vessel 01/05/2011    BRAIN   • Carpal tunnel syndrome on right    • COPD (chronic obstructive pulmonary disease) ([facility])     MODERATE   • DDD (degenerative disc disease), cervical 09/22/2014    MODERATE C6-C7, SEEING DR. EWING   • Drug therapy     lung ca   • Emphysema of lung (HCC)    • Fibroids     UTERINE X 2   • Hx of radiation therapy     lung ca   • Hypertension    • MI (myocardial infarction) ([facility]) 06/2009   • Mitral insufficiency    • Non-small cell carcinoma of lung (HCC)     Stabe IIA   • Ovarian cyst     RIGHT   • Pain of right upper extremity 11/18/2015   • Pelvic pain    • Right shoulder pain    • SOB (shortness of breath)    • Thyroid nodule    • Vertigo 05/13/2015   • Weight loss        Surgical History  Past Surgical History:   Procedure  Laterality Date   • APPENDECTOMY     • CATARACT EXTRACTION, BILATERAL  03/2019   • LUNG LOBECTOMY Right 2012    upper lobectomy and lymphadenectomy       Family History  Family History   Problem Relation Age of Onset   • Cancer Mother 47        Brain/lungs   • Brain cancer Father    • Breast cancer Neg Hx        Social History  Social History    Tobacco Use      Smoking status: Current Every Day Smoker        Packs/day: 0.50        Years: 20.00        Pack years: 10        Types: Cigarettes      Smokeless tobacco: Current User      Tobacco comment: DECREASED ABOUT TO 1/3 PPD ON 4-26-12       Is the Patient a current tobacco user? No    Allergies  Allergies   Allergen Reactions   • Codeine Irritability       Current Medications    Current Outpatient Medications:   •  albuterol sulfate HFA (Ventolin HFA) 108 (90 Base) MCG/ACT inhaler, Inhale 2 puffs Every 4 (Four) Hours As Needed for Wheezing., Disp: 17 g, Rfl: 3  •  naproxen (Naprosyn) 250 MG tablet, Take 1.5 tablets by mouth 2 (Two) Times a Day With Meals., Disp: 40 tablet, Rfl: 1     Review of System  Review of Systems   Eyes: Negative.    Respiratory: Negative.    Cardiovascular: Negative.    Musculoskeletal: Positive for arthralgias and back pain.   Hematological: Negative.      I have reviewed and confirmed the accuracy of the ROS as documented by the MA/LPN/RN Jovany Wiley MD    Vitals:    05/05/22 0904   BP: 120/80   Resp: 16     Body mass index is 26.31 kg/m².    Objective     Physical Exam  Physical Exam  Cardiovascular:      Pulses: Normal pulses.      Heart sounds: Normal heart sounds.   Pulmonary:      Effort: Pulmonary effort is normal.      Breath sounds: Normal breath sounds.   Musculoskeletal:      Cervical back: Normal range of motion and neck supple.      Comments: Back pain x1 to 2 weeks.  Knee pain times several months.  Left hip pain for 1 to 2 weeks.         Assessment/Plan      This pleasant 62-year-old patient presents at this time for  Follow-up of left-sided back pain with sciatica.  She was given Naprosyn 375 mg 1 tab twice a day with her last visit along with Flexeril for neck pain.  She presents today for follow-up she relates that her epigastric pain has resolved but that she still has the low back pain.  Unfortunately the patient's insurance does not cover this visit and the front office communicated that with her insurance we are out of network.  I discussed with the patient either having her insurance put us in network so that we could continue to see her or she would need to be seen by  In her insurance network.  I asked her to discuss that with her insurance and get into see someone sooner she could.  I also discussed with her that her insurance would not cover the CT scan I ordered from her last visit and that she needed to be seen by physician in her network as soon as she could.  Diagnoses and all orders for this visit:    1. Neck pain (Primary)    2. Epigastric mass    3. Chronic pain of right knee      Plan:  1.)  Patient was urged to see a physician in her network [facility] if she could.  2.)  She is urged to take her Naprosyn 375 mg 1 tab p.o. twice daily for now for back and knee pain.       Jovany Wiley MD  05/05/2022

## 2022-05-10 ENCOUNTER — TELEPHONE (OUTPATIENT)
Dept: ONCOLOGY | Facility: CLINIC | Age: 62
End: 2022-05-10

## 2022-05-12 ENCOUNTER — TELEPHONE (OUTPATIENT)
Dept: ONCOLOGY | Facility: CLINIC | Age: 62
End: 2022-05-12

## 2022-05-12 DIAGNOSIS — C34.11 MALIGNANT NEOPLASM OF UPPER LOBE OF RIGHT LUNG: Primary | ICD-10-CM

## 2022-05-12 NOTE — TELEPHONE ENCOUNTER
I spoke to this patient to remind her that her insurance is non-par, she knows that she is responsible for the office charges and we are moving her scans to [facility]. I also told her that she will have to sign a notice of non-coverage from when she is in for her next visit, she understood.

## 2022-06-01 ENCOUNTER — TELEPHONE (OUTPATIENT)
Dept: ONCOLOGY | Facility: CLINIC | Age: 62
End: 2022-06-01

## 2022-06-01 NOTE — TELEPHONE ENCOUNTER
Reviewed CT scan with Dr. Burns. Per Dr. Burns, pt may require CT scans for follow up but he will discuss with her at her 6/13 visit. Informed pt and she v/u. No further questions or concerns.

## 2022-06-01 NOTE — TELEPHONE ENCOUNTER
Caller: Hatchett, Yolanda Lee    Relationship: Self    Best call back number: [phone]    Caller requesting test results: KIRSTEN    What test was performed: CT SCAN    When was the test performed: 5/24/2022    Where was the test performed: [facility]

## 2022-06-01 NOTE — TELEPHONE ENCOUNTER
Patient is requesting CT scan results.  CT chest, abdomen and pelvis were completed on 5/24/2022 at [facility].  Results are in Care Everywhere.

## 2022-06-10 NOTE — PROGRESS NOTES
Patient indicates she continues to have abdominal pain, like help in smoking cessation      Subjective .  REASONS FOR FOLLOWUP:    1. Nonsmall cell lung carcinoma, stage IIA (down staged from presumed stage IIIA). The patient received neoadjuvant chemotherapy with carboplatin/Alimta x2. Followup scans showed considerable response. The patient is status post right upper lobectomy and lymphadenectomy (discharged 03/07/2012). Per Thoracic Conference, additional two cycles of adjuvant carboplatin/Alimta was advised and initiated on 04/11/2012. She has since remained in remission.     2.  Chest x-ray February 2004, July 20 2020-left hilar nodule 10 mm reduced to 6 mm    3.  Low-dose CT scan without evidence of recurrent disease 5/17/2021    4.  Patient seen 5/9/2022 with bilateral hip pain, abdominal swelling    5.  Patient assessed by CT chest and pelvis at [facility] 5/24/2022, pulmonary nodule, bilateral adrenal nodules, follow-up scans in 3 months planned, smoking cessation planned            History of Present Illness    The patient is a 62 y.o. female with the above-mentioned history, who returns the office today for 3-month follow-up.  She remains in observation in regards to her malignancy.  She did undergo surveillance CT scan July 2019 with a left lower lobe nodule, 1.1 cm noted.  She then underwent PET scan 7/24/2019 with no metabolic activity identified.   The patient reports today she overall feels very well.  She does have some chronic shortness of breath on exertion though recovers quickly with rest.  She has been exercising and changed her diet, therefore has lost some weight.  She continues to have a chronic cough with occasional productive clear sputum.  She is attempting to stop smoking, though continues to smoke 1 pack/day.  She was recently prescribed Chantix by pulmonology.  She denies any new pain.  The patient is next seen January 2, 2020.  Unfortunately she is no longer covered by  insurance though she remains, understandably, concerned about a previously noted left lower lobe pulmonary nodule.  A follow-up scan was scheduled though she is not able to cover that currently financially.      Plans were made for reassessment and the patient is next seen February 12, 2020 with repeat chest x-ray showing again a 1 cm lung nodule projecting just lateral to the left hilum between the level of the posterior left eighth and ninth ribs.  Fortunately she has not further symptomatic at this point additionally.  A follow-up chest x-ray was requested and obtained July 20, 2020 fortunately demonstrating a reduction in the left hilar nodule from 10 mm now to 6 mm.  She continues to have ongoing back pain indicating that insurance coverage has lapsed.        We elected to obtain a follow-up chest x-ray the patient is seen March 1, 2021.  Fortunately she is feeling well and repeat chest x-ray February 22, 2021 shows pulmonary hyperexpansion.  The previously noted lung nodules not noted left lower lobe or other suspicious lesion.  We have discussed follow-up low-dose CT scanning.    The patient underwent low-dose CT scan 5/17/2021 showing stable fairly dense pulmonary nodule left lower lobe that was thought to be granulomatous.  There were no other findings that might be suspicious for malignancy.  Patient, fortunately is feeling fair except for chronic back pain.  This is being assessed by her primary care physician with additional films.    The patient is reassessed 11/7/2021.  Mammographic screening 10/11/2021 with a negative, chest x-ray was performed 11/6/2021 and is negative for new abnormalities.  Patient states that Dr. Wiley  MRI of the lumbar spine is reviewed result the patient's current low back pain    The patient is next seen, ever, 5/9/2022 indicating that she has been having increasing hip pain as well as abdominal swelling.  Her primary care had endeavor to have her undergo repeat scans that  are not covered at [facility] and thus we will try to schedule as an outpatient at a [facility] facility.    Patient had her scans performed at [facility] CT abdomen pelvis 5/24/2022 demonstrating indeterminate 8 x 9 mm left lower lobe pulmonary nodule, indeterminate bilateral adrenal gland nodules measuring up to 1.4 cm.  There is no comparison studies done on these examinations.    The patient is seen 6/13/2022 discussed that she had previous scans 2015 showing a left adrenal adenoma but no abnormalities in the right adrenal gland.  Follow-up studies scheduled 3 months intervals [place].  Smoking cessation plan and pulmonary referral planned.      Past Medical History:   Diagnosis Date   • Asthma    • Bleeding of blood vessel 01/05/2011    BRAIN   • Carpal tunnel syndrome on right    • COPD (chronic obstructive pulmonary disease) (HCC)     MODERATE   • DDD (degenerative disc disease), cervical 09/22/2014    MODERATE C6-C7, SEEING DR. EWING   • Drug therapy     lung ca   • Emphysema of lung (HCC)    • Fibroids     UTERINE X 2   • Hx of radiation therapy     lung ca   • Hypertension    • MI (myocardial infarction) (HCC) 06/2009   • Mitral insufficiency    • Non-small cell carcinoma of lung (HCC)     Stabe IIA   • Ovarian cyst     RIGHT   • Pain of right upper extremity 11/18/2015   • Pelvic pain    • Right shoulder pain    • SOB (shortness of breath)    • Thyroid nodule    • Vertigo 05/13/2015   • Weight loss        ONCOLOGIC HISTORY:  (History from previous dates can be found in the separate document.)    Current Outpatient Medications on File Prior to Visit   Medication Sig Dispense Refill   • albuterol sulfate HFA (Ventolin HFA) 108 (90 Base) MCG/ACT inhaler Inhale 2 puffs Every 4 (Four) Hours As Needed for Wheezing. 17 g 3   • [DISCONTINUED] naproxen (Naprosyn) 250 MG tablet Take 1.5 tablets by mouth 2 (Two) Times a Day With Meals. 40 tablet 1     No current facility-administered medications on  "file prior to visit.       ALLERGIES:     Allergies   Allergen Reactions   • Codeine Irritability       Social History     Socioeconomic History   • Marital status:    • Years of education: College   Tobacco Use   • Smoking status: Current Every Day Smoker     Packs/day: 0.50     Years: 20.00     Pack years: 10.00     Types: Cigarettes   • Smokeless tobacco: Current User   • Tobacco comment: DECREASED ABOUT TO 1/3 PPD ON 4-26-12   Vaping Use   • Vaping Use: Never used   Substance and Sexual Activity   • Alcohol use: Yes     Comment: OCCASIONAL   • Drug use: Yes     Types: Marijuana   • Sexual activity: Defer         Cancer-related family history includes Brain cancer in her father; Cancer (age of onset: 47) in her mother. There is no history of Breast cancer.      Review of Systems   Constitutional: Negative for chills, fatigue and fever.   HENT: Negative for mouth sores and sore throat.    Eyes: Negative for visual disturbance.   Respiratory: Negative for cough, chest tightness, shortness of breath (chronic unchanged) and wheezing.    Cardiovascular: Negative for chest pain and leg swelling.   Gastrointestinal: Positive for abdominal distention. Negative for abdominal pain, constipation and vomiting.   Genitourinary: Negative for dysuria and frequency.   Musculoskeletal: Positive for back pain, joint swelling and neck pain.   Neurological: Positive for numbness. Negative for dizziness and weakness.   Hematological: Does not bruise/bleed easily.   Psychiatric/Behavioral: The patient is not nervous/anxious.    All other systems reviewed and are negative.  Review of systems 09/18/2019  unchanged from previous office visit except as updated.       Objective      Vitals:    06/13/22 1014   BP: (!) 157/105   Pulse: 72   Resp: 16   Temp: 97.5 °F (36.4 °C)   TempSrc: Temporal   SpO2: 97%   Weight: 65.6 kg (144 lb 11.2 oz)   Height: 158.8 cm (62.52\")   PainSc:   6   PainLoc: Back  Comment: Back and hip pain "     Current Status 6/13/2022   ECOG score 0       Physical Exam    GENERAL:  female alert and oriented.   SKIN: Warm, dry without rashes, purpura or petechiae.   HEAD: Normocephalic.   EYES: Pupils equal, round and reactive to light. EOMs intact. Conjunctivae normal.   EARS: Hearing intact.   NOSE: Septum midline. No excoriations or nasal discharge.   MOUTH: Tongue is well-papillated; no stomatitis or ulcers. Lips normal.   THROAT: Oropharynx without lesions or exudates.   NECK: Supple with good range of motion; no thyromegaly or masses, no JVD or bruits.   LYMPHATICS: No cervical, supraclavicular adenopathy.   CHEST: Lungs clear to auscultation, prolonged expiratory phase noted bilaterally.   CARDIAC: Regular rate and rhythm without murmurs, rubs or gallops. Bowel sounds present.  ABDOMEN: Soft, nontender with no organomegaly or masses.   EXTREMITIES: No clubbing, cyanosis or edema.   NEUROLOGICAL: No focal neurological deficits. .      RECENT LABS:  Results from last 7 days   Lab Units 06/13/22  0923   WBC 10*3/mm3 7.64   NEUTROS ABS 10*3/mm3 4.30   HEMOGLOBIN g/dL 13.9   HEMATOCRIT % 41.0   PLATELETS 10*3/mm3 253      SCANNED - IMAGING (05/24/2022)      Assessment plan;        1. Nonsmall cell lung carcinoma, stage IIA (down staged from presumed stage IIIA).  Staging MRI of the brain negative. The patient received neoadjuvant chemotherapy with carboplatin/Alimta x2. Followup scans showed considerable response. The patient is status post right upper lobectomy and lymphadenectomy (discharged 03/07/2012). Per Thoracic Conference, additional two cycles of adjuvant carboplatin/Alimta was advised and initiated on 04/11/2012. She has since remained in remission.    Low-dose screening CT of the chest July 2018 with a 1.1 left lower lobe nodule noted.  Follow-up PET scan the nodule was photopenic.  The patient has a follow-up review now in February 2020 after we have her apply for assistance with [employer].  A chest x-ray be requested in 5 weeks to see the physician in 6 weeks.  This proceeded with patient assessed February 12, 2020 with chest x-ray February 4  not significantly changed from previous.  At this point will reassess again at 6 months with MD, chest x-ray CBC and CMP.     This is reassessed September 30, 2020 with a left hilar nodule having dropped from 10 mm to 6 mm.  We will continue reassessment every 6 months.  The patient's follow-up chest x-ray February 22, 2021 is negative for abnormalities though low-dose CT scan is felt reasonable and be scheduled within the next 3 months.  She will be seen formally after the scan is done.  She agrees with this plan and follow-up.  The patient's follow-up low-dose CT chest done in follow-up 5/17/2021 was negative for recurrent disease.  There remains stable fairly dense pulmonary nodule left lower lobe thought to be a granuloma.  The patient had a follow-up chest x-ray 11/6/2021 without new abnormalities.  We discussed a repeat low-dose CT scan but she has an MRI possibly scheduled next several months of the lumbar spine.    Paced rhythm at 5/9/2022 with progressive symptoms of abdominal discomfort and swelling, her physical exam is not particularly abnormal but she is also having a little hip pain and has chronic issues with shortness of breath and cough as well as smoking.    Patient underwent scans at [facility] with CT chest abdomen pelvis 5/24/2022 demonstrating indeterminate 8 x 9 mm left lower lobe pulmonary nodule, indeterminate bilateral adrenal gland nodules measuring up to 1.4 cm.  There is no comparison studies done on these examinations.    The patient is seen 6/13/2022 discussed that she had previous scans 2015 showing a left adrenal adenoma but no abnormalities in the right adrenal gland.  Follow-up studies scheduled 3 months intervals [place].  Smoking cessation plan and pulmonary referral planned.      2.  COPD.   Currently without symptoms of exacerbation.  She does see pulmonology and a follow-up will be scheduled.    3.  Smoking cessation.  We will refer to smoking cessation.                                                                                      4. Chronic pain. The patient is seen and managed by pain management.         Plan:    *Referrals to smoking cessation, pulmonary medicine    *Naprosyn 500 mg tablets 1 p.o. twice daily with food #40 E scribed to pharmacy    *Repeat CT of chest, abdomen and pelvis in 11 weeks at [facility]    *12 weeks MD

## 2022-06-13 ENCOUNTER — LAB (OUTPATIENT)
Dept: LAB | Facility: HOSPITAL | Age: 62
End: 2022-06-13

## 2022-06-13 ENCOUNTER — OFFICE VISIT (OUTPATIENT)
Dept: ONCOLOGY | Facility: CLINIC | Age: 62
End: 2022-06-13

## 2022-06-13 VITALS
OXYGEN SATURATION: 97 % | HEART RATE: 72 BPM | HEIGHT: 63 IN | SYSTOLIC BLOOD PRESSURE: 157 MMHG | RESPIRATION RATE: 16 BRPM | WEIGHT: 144.7 LBS | TEMPERATURE: 97.5 F | DIASTOLIC BLOOD PRESSURE: 105 MMHG | BODY MASS INDEX: 25.64 KG/M2

## 2022-06-13 DIAGNOSIS — C34.11 MALIGNANT NEOPLASM OF UPPER LOBE OF RIGHT LUNG: ICD-10-CM

## 2022-06-13 DIAGNOSIS — J44.9 CHRONIC OBSTRUCTIVE PULMONARY DISEASE, UNSPECIFIED COPD TYPE: Primary | ICD-10-CM

## 2022-06-13 DIAGNOSIS — C34.92 ADENOCARCINOMA OF LEFT LUNG: ICD-10-CM

## 2022-06-13 LAB
BASOPHILS # BLD AUTO: 0.04 10*3/MM3 (ref 0–0.2)
BASOPHILS NFR BLD AUTO: 0.5 % (ref 0–1.5)
DEPRECATED RDW RBC AUTO: 38.6 FL (ref 37–54)
EOSINOPHIL # BLD AUTO: 0.28 10*3/MM3 (ref 0–0.4)
EOSINOPHIL NFR BLD AUTO: 3.7 % (ref 0.3–6.2)
ERYTHROCYTE [DISTWIDTH] IN BLOOD BY AUTOMATED COUNT: 12.9 % (ref 12.3–15.4)
HCT VFR BLD AUTO: 41 % (ref 34–46.6)
HGB BLD-MCNC: 13.9 G/DL (ref 12–15.9)
IMM GRANULOCYTES # BLD AUTO: 0.05 10*3/MM3 (ref 0–0.05)
IMM GRANULOCYTES NFR BLD AUTO: 0.7 % (ref 0–0.5)
LYMPHOCYTES # BLD AUTO: 2.39 10*3/MM3 (ref 0.7–3.1)
LYMPHOCYTES NFR BLD AUTO: 31.3 % (ref 19.6–45.3)
MCH RBC QN AUTO: 27.9 PG (ref 26.6–33)
MCHC RBC AUTO-ENTMCNC: 33.9 G/DL (ref 31.5–35.7)
MCV RBC AUTO: 82.3 FL (ref 79–97)
MONOCYTES # BLD AUTO: 0.58 10*3/MM3 (ref 0.1–0.9)
MONOCYTES NFR BLD AUTO: 7.6 % (ref 5–12)
NEUTROPHILS NFR BLD AUTO: 4.3 10*3/MM3 (ref 1.7–7)
NEUTROPHILS NFR BLD AUTO: 56.2 % (ref 42.7–76)
NRBC BLD AUTO-RTO: 0 /100 WBC (ref 0–0.2)
PLATELET # BLD AUTO: 253 10*3/MM3 (ref 140–450)
PMV BLD AUTO: 9.5 FL (ref 6–12)
RBC # BLD AUTO: 4.98 10*6/MM3 (ref 3.77–5.28)
WBC NRBC COR # BLD: 7.64 10*3/MM3 (ref 3.4–10.8)

## 2022-06-13 PROCEDURE — 99214 OFFICE O/P EST MOD 30 MIN: CPT | Performed by: INTERNAL MEDICINE

## 2022-06-13 PROCEDURE — 85025 COMPLETE CBC W/AUTO DIFF WBC: CPT

## 2022-06-13 PROCEDURE — 36415 COLL VENOUS BLD VENIPUNCTURE: CPT

## 2022-06-13 RX ORDER — NAPROXEN 250 MG/1
500 TABLET ORAL 2 TIMES DAILY WITH MEALS
Qty: 40 TABLET | Refills: 1 | Status: SHIPPED | OUTPATIENT
Start: 2022-06-13

## 2022-06-20 ENCOUNTER — DOCUMENTATION (OUTPATIENT)
Dept: OTHER | Facility: HOSPITAL | Age: 62
End: 2022-06-20

## 2022-06-20 ENCOUNTER — APPOINTMENT (OUTPATIENT)
Dept: OTHER | Facility: HOSPITAL | Age: 62
End: 2022-06-20

## 2022-06-20 NOTE — PROGRESS NOTES
[facility] MULTIDISCIPLINARY CLINIC  SURVIVORSHIP SERVICES CARE COORDINATION NOTE  Smoking Cessation Initial Visit  [facility]      Patient seen in [facility] RE: questions regarding smoking cessation.    Patient referred from Dr Burns for smoking cessation counseling.     SMOKING CESSATION HPI:    Patient with a history of non-small cell carcinoma, treated with adjuvant carboplatin/Alimta x2; and status post right upper lobectomy and lymphadenectomy 3/7/2012; received an additional 2 cycles of adjuvant carboplatin Alimta.  She has remained in remission since this time.  [facility] chest and pelvis showing pulmonary nodule, bilateral adrenal nodules, follow-up scans in 3 months planned.    She reports currently smoking 20 cigarettes/day and this has been her typical pattern.  Began smoking age 12.    She reports rare alcohol use maybe 1 or 2 drinks a month.  She drinks several homero a day but no coffee and has increased her water intake.  She smokes marijuana daily.  She has tried of vape pen in the past but stopped using pretty soon afterwards.    Fagerstrom score of 4 LOW TO MODERATE dependence on nicotine  Patient importance of quitting smoking 6/10.   Rates confidence of being able to quit 6/10.   They are not ready to quit smoking within the next 2-4 weeks.    Patient is not willing to set a quit date.  Patient identified motivating factors for quitting include she continues to have concerns about her health, particularly with current surveillance of her chest at this point.   Patient identified potential support resources available including she is able to refrain from smoking when around non-smoking family members aid in places where smoking is forbidden.   Patient anticipates future challenges/barriers including she does tend to enjoy the cigarette after meals most and is identified this is a trigger.  She notes she lives with her  spouse who also smokes cigarettes and they currently smoke inside their home in vehicles.      Prior quit attempts: She has been able to quit for a 1 month period  During and after hospitalizations.    She has used patches in the past but found they were not helpful and typically would pull the patch off when she would smoke a cigarette.    She has tried Chantix in the past but she did have some subtle mood changes nausea and became lightheaded and so discontinued this.    Strongly encouraged patient to stop smoking. Explained that multiple attempts to quit are often needed before patients can achieve prolonged periods of time free from tobacco.      QUIT PLAN (STAR):  Set quit date: Deferred today    Tell friends/family: We began to explore ways she can ask family members for support such as consideration of limiting smoking to only 1 part of the home or outside altogether, and or not smoking around the patient.    Anticipate challenges: Exploration of triggers today-typically after meals or when driving in the car.    Remove tobacco from environment: We discussed that persons were able to move smoking outside the home increase the likelihood of a successful quit attempt.    Medication plan: We did discuss recent Surgeon General recommendations of 2020 suggesting people who smoke may benefit from dual nicotine replacement therapy utilizing a long acting nicotine replacement (nicotine transdermal patch) and in conjunction using a short-term nicotine replacement for breakthrough cravings (nicotine gum, nicotine lozenge, nicotine oral or nasal inhaler)    We reviewed all 7 FDA approved medications for smoking cessation today with particular focus on nicotine lozenges.  We discussed that if she were to use lozenges her dose would be at the 4 mg dose level.  She could use these every 1-2 hours as needed for cravings.       Practical counseling: She is not ready to set a quit date.  We discussed strategies to increase  confidence in self efficacy including keeping a 72-hour cigarette log, consolidating all smoking to 1 room of the house removing it outside altogether, concept of a practice quit where she would practice using nicotine replacement such as lozenges instead of cigarettes for a 24-hour period or similar.    We discussed evidence-based approaches to quit smoking including options for pharmacotherapy, nicotine replacement therapy, and supportive counseling in group, individual or phone/web based settings. Discussed that counseling or medications used alone are effective but effectiveness is increased when both methods are used.  Advised that e-cigarettes and/or electronic cigarettes are not recommended for smoking cessation or as a safe alternative to smoking.    Follow-up: We were unable to register the patient for a visit today due to some issues regarding insurance coverage that our department will follow up on. I have provided Rebeca several resources for follow up including the Quit Line at 8-838-QUITNOW. I've asked her to call my office in two weeks by phone regardless of coverage so we can discuss next steps: continued follow up with me vs Quit Line referral. She knows she can initiate a call to the quit line on her own at any time.    Call my office as needed at [phone] for additional information, resources or support..

## 2022-07-05 ENCOUNTER — DOCUMENTATION (OUTPATIENT)
Dept: OTHER | Facility: HOSPITAL | Age: 62
End: 2022-07-05

## 2022-07-05 NOTE — PROGRESS NOTES
[facility] MULTIDISCIPLINARY CLINIC  SURVIVORSHIP SERVICES CARE COORDINATION NOTE  Smoking Cessation Follow Up Visit  PHONE        Follow up phone call regarding smoking cessation counseling today. She has been considering our discussion and is grappling with starting a quit date. For now she thinks she is inclined to wait until the new year. She is considering limiting herself to one area of the house to smoke - outside in this heat is too hot. Has not heard from quit line but does screen for spam calls.      SMOKING CESSATION HPI:     Patient with a history of non-small cell carcinoma, treated with adjuvant carboplatin/Alimta x2; and status post right upper lobectomy and lymphadenectomy 3/7/2012; received an additional 2 cycles of adjuvant carboplatin Alimta.  She has remained in remission since this time.  [facility] chest and pelvis showing pulmonary nodule, bilateral adrenal nodules, follow-up scans in 3 months planned.     She reports currently smoking 20 cigarettes/day and this has been her typical pattern.  Began smoking age 12.     She reports rare alcohol use maybe 1 or 2 drinks a month.  She drinks several homero a day but no coffee and has increased her water intake.  She smokes marijuana daily.  She has tried of vape pen in the past but stopped using pretty soon afterwards.     Fagerstrom score of 4 LOW TO MODERATE dependence on nicotine  Patient importance of quitting smoking 6/10.   Rates confidence of being able to quit 6/10.   They are not ready to quit smoking within the next 2-4 weeks.    Patient is not willing to set a quit date.  Patient identified motivating factors for quitting include she continues to have concerns about her health, particularly with current surveillance of her chest at this point.   Patient identified potential support resources available including she is able to refrain from smoking when around non-smoking family members aid in  places where smoking is forbidden.   Patient anticipates future challenges/barriers including she does tend to enjoy the cigarette after meals most and is identified this is a trigger.  She notes she lives with her spouse who also smokes cigarettes and they currently smoke inside their home in vehicles.       Prior quit attempts: She has been able to quit for a 1 month period  During and after hospitalizations.     She has used patches in the past but found they were not helpful and typically would pull the patch off when she would smoke a cigarette.     She has tried Chantix in the past but she did have some subtle mood changes nausea and became lightheaded and so discontinued this.     Strongly encouraged patient to stop smoking. Explained that multiple attempts to quit are often needed before patients can achieve prolonged periods of time free from tobacco.        QUIT PLAN (STAR):  Set quit date: January 1, 2023     Tell friends/family: We began to explore ways she can ask family members for support such as consideration of limiting smoking to only 1 part of the home or outside altogether, and or not smoking around the patient.      Anticipate challenges: Exploration of triggers today-typically after meals or when driving in the car. Fears she will be unable to come to consensus with her spouse about limiting smoking to outside or one area of the house.     Remove tobacco from environment: We discussed that persons were able to move smoking outside the home increase the likelihood of a successful quit attempt. This will be hard for her due to intolerance of extreme temps worsening breathing.     Medication plan: We did discuss recent Surgeon General recommendations of 2020 suggesting people who smoke may benefit from dual nicotine replacement therapy utilizing a long acting nicotine replacement (nicotine transdermal patch) and in conjunction using a short-term nicotine replacement for breakthrough cravings  (nicotine gum, nicotine lozenge, nicotine oral or nasal inhaler)     We reviewed all 7 FDA approved medications for smoking cessation today with particular focus on nicotine lozenges.  We discussed that if she were to use lozenges her dose would be at the 4 mg dose level.  She could use these every 1-2 hours as needed for cravings.        Practical counseling: Tentatively 1/1/2023.  We reviewed strategies to increase confidence in self efficacy including keeping a 72-hour cigarette log, consolidating all smoking to 1 room of the house removing it outside altogether, concept of a practice quit where she would practice using nicotine replacement such as lozenges instead of cigarettes for a 24-hour period or similar.     We discussed evidence-based approaches to quit smoking including options for pharmacotherapy, nicotine replacement therapy, and supportive counseling in group, individual or phone/web based settings. Discussed that counseling or medications used alone are effective but effectiveness is increased when both methods are used.  Advised that e-cigarettes and/or electronic cigarettes are not recommended for smoking cessation or as a safe alternative to smoking.     Follow-up: I've offered to resend the referral to the quit line - for now she will plan to call them on her own as needed. Encouraged again log of cigarettes to examine patterns/triggers.     She is on campus 9/19 to follow up with Dr Burns, we will follow up in the cancer resource center at that time.     Call my office as needed at [phone] for additional information, resources or support..

## 2022-09-01 ENCOUNTER — DOCUMENTATION (OUTPATIENT)
Dept: ONCOLOGY | Facility: CLINIC | Age: 62
End: 2022-09-01

## 2022-09-01 NOTE — PROGRESS NOTES
Received a call from Dr. Upton (radiologist). He was wondering if we are looking for a new malignancy on pt's CT scan or if we are looking for metastatic disease. D/W Dr. Burns. Per Dr. Burns, we are looking for a new hepatocellular primary. Informed Dr. Upton who v/u.

## 2022-09-07 ENCOUNTER — TELEPHONE (OUTPATIENT)
Dept: ONCOLOGY | Facility: CLINIC | Age: 62
End: 2022-09-07

## 2022-09-07 DIAGNOSIS — J44.9 CHRONIC OBSTRUCTIVE PULMONARY DISEASE, UNSPECIFIED COPD TYPE: ICD-10-CM

## 2022-09-07 DIAGNOSIS — R05.9 COUGH: ICD-10-CM

## 2022-09-07 DIAGNOSIS — C34.11 MALIGNANT NEOPLASM OF UPPER LOBE OF RIGHT LUNG: Primary | ICD-10-CM

## 2022-09-15 ENCOUNTER — TELEPHONE (OUTPATIENT)
Dept: ONCOLOGY | Facility: CLINIC | Age: 62
End: 2022-09-15

## 2022-09-15 NOTE — TELEPHONE ENCOUNTER
Caller: Hatchett, Yolanda Lee    Relationship to patient: Self    Best call back number: [phone]    Type of visit: LAB AND FOLLOW UP    Requested date: AFTER 10/12    If rescheduling, when is the original appointment: 09/19    Additional notes: PLEASE CALL ONCE R/S      SHE ALSO PROVIDED THE FAX NUMBER FOR [facility]: [phone]

## 2022-09-19 ENCOUNTER — APPOINTMENT (OUTPATIENT)
Dept: LAB | Facility: HOSPITAL | Age: 62
End: 2022-09-19

## 2022-09-19 DIAGNOSIS — Z12.11 COLON CANCER SCREENING: Primary | ICD-10-CM

## 2022-10-12 ENCOUNTER — TELEPHONE (OUTPATIENT)
Dept: FAMILY MEDICINE CLINIC | Facility: CLINIC | Age: 62
End: 2022-10-12

## 2022-10-12 ENCOUNTER — TELEPHONE (OUTPATIENT)
Dept: ONCOLOGY | Facility: CLINIC | Age: 62
End: 2022-10-12

## 2022-10-12 DIAGNOSIS — C34.11 MALIGNANT NEOPLASM OF UPPER LOBE OF RIGHT LUNG: Primary | ICD-10-CM

## 2022-10-12 NOTE — TELEPHONE ENCOUNTER
Caller: Hatchett, Yolanda Lee    Relationship: Self    Best call back number: [phone]    What is the best time to reach you: ANYTIME    Who are you requesting to speak with (clinical staff, provider,  specific staff member): CLINICAL STAFF    Do you know the name of the person who called: PATIENT     What was the call regarding: PATIENT CALLED AND SAID SHE'S BEEN HAVING PAIN IN HER LEFT LEG AND SOME OF HER TOES HAVE BEEN FEELING NUMB, SHE WAS WANTING TO SEE IF SHE CAN SEE DR. BETTS FOR AN APPOINTMENT OR IF HE HAS ANOTHER RECOMMENDATION     Do you require a callback: YES

## 2022-10-12 NOTE — TELEPHONE ENCOUNTER
HUB TO READ: Arthur to call me to let her know we are working on auth at [facility] for scans tomorrow. We were jsut notified of new insurance and we are unable to obtain the auth that fast. Also, asking if she wants to move scans to [facility] since she has new ins that covers [facility]. Have patient call [phone].

## 2022-10-13 ENCOUNTER — APPOINTMENT (OUTPATIENT)
Dept: LAB | Facility: HOSPITAL | Age: 62
End: 2022-10-13

## 2022-10-14 ENCOUNTER — TELEPHONE (OUTPATIENT)
Dept: FAMILY MEDICINE CLINIC | Facility: CLINIC | Age: 62
End: 2022-10-14

## 2022-10-19 ENCOUNTER — APPOINTMENT (OUTPATIENT)
Dept: WOMENS IMAGING | Facility: HOSPITAL | Age: 62
End: 2022-10-19

## 2022-10-19 ENCOUNTER — OFFICE VISIT (OUTPATIENT)
Dept: OBSTETRICS AND GYNECOLOGY | Age: 62
End: 2022-10-19

## 2022-10-19 ENCOUNTER — PROCEDURE VISIT (OUTPATIENT)
Dept: OBSTETRICS AND GYNECOLOGY | Age: 62
End: 2022-10-19

## 2022-10-19 VITALS
HEIGHT: 63 IN | SYSTOLIC BLOOD PRESSURE: 116 MMHG | WEIGHT: 148.2 LBS | DIASTOLIC BLOOD PRESSURE: 60 MMHG | BODY MASS INDEX: 26.26 KG/M2

## 2022-10-19 DIAGNOSIS — Z01.419 ENCOUNTER FOR GYNECOLOGICAL EXAMINATION: Primary | ICD-10-CM

## 2022-10-19 DIAGNOSIS — R10.32 LEFT LOWER QUADRANT ABDOMINAL PAIN: ICD-10-CM

## 2022-10-19 DIAGNOSIS — Z12.31 VISIT FOR SCREENING MAMMOGRAM: Primary | ICD-10-CM

## 2022-10-19 PROCEDURE — 77067 SCR MAMMO BI INCL CAD: CPT | Performed by: NURSE PRACTITIONER

## 2022-10-19 PROCEDURE — 99396 PREV VISIT EST AGE 40-64: CPT | Performed by: NURSE PRACTITIONER

## 2022-10-19 PROCEDURE — 77063 BREAST TOMOSYNTHESIS BI: CPT | Performed by: NURSE PRACTITIONER

## 2022-10-19 PROCEDURE — 77067 SCR MAMMO BI INCL CAD: CPT | Performed by: RADIOLOGY

## 2022-10-19 PROCEDURE — 77063 BREAST TOMOSYNTHESIS BI: CPT | Performed by: RADIOLOGY

## 2022-10-19 NOTE — PROGRESS NOTES
Subjective       History of Present Illness    Chief Complaint   Patient presents with   • Gynecologic Exam     annual exam, last pap 7/15/21 (-), mammo today, pt c/o lower left quadrant pain that's been going on for a couple months        Yolanda Lee Hatchett is a 62 y.o. female who presents for annual exam.  Patient of Dr. Christiansen  Here for AE  Complains of some llq pain that has been going on for a few months  No vaginal bleeding    OB History    Para Term  AB Living   2 2 2     2   SAB IAB Ectopic Molar Multiple Live Births             2      # Outcome Date GA Lbr Rayray/2nd Weight Sex Delivery Anes PTL Lv   2 Term    3317 g (7 lb 5 oz) M Vag-Spont   HAYDEE   1 Term    3232 g (7 lb 2 oz) F Vag-Spont   HAYDEE       The following portions of the patient's history were reviewed and updated as appropriate: allergies, current medications, past family history, past medical history, past social history, past surgical history and problem list.        Current contraception: post menopausal status  History of abnormal Pap smear: no  Family history of Breast cancer: no  Family history of uterine or ovarian cancer: no  Family History of colon cancer/colon polyps: no  History of abnormal mammogram: no    Mammogram: done today.  Colonoscopy: advised- cologuard sent  DEXA: not indicated.  Last Pap: neg    Social History    Tobacco Use      Smoking status: Every Day        Packs/day: 0.50        Years: 20.00        Pack years: 10        Types: Cigarettes      Smokeless tobacco: Current      Tobacco comments: DECREASED ABOUT TO 1/3 PPD ON 12    Exercise: not active  Calcium/Vitamin D: adequate intake    The following portions of the patient's history were reviewed and updated as appropriate: allergies, current medications, past family history, past medical history, past social history, past surgical history and problem list.    Review of Systems   Constitutional: Negative.    HENT: Negative.    Eyes:  "Negative.    Respiratory: Negative.    Cardiovascular: Negative.    Gastrointestinal: Negative.    Endocrine: Negative.    Genitourinary: Positive for pelvic pain.   Musculoskeletal: Negative.    Skin: Negative.    Allergic/Immunologic: Negative.    Neurological: Negative.    Hematological: Negative.    Psychiatric/Behavioral: Negative.          Objective   Physical Exam  Vitals reviewed.   Constitutional:       Appearance: She is well-developed.   Neck:      Thyroid: No thyroid mass.   Cardiovascular:      Rate and Rhythm: Normal rate and regular rhythm.      Heart sounds: Normal heart sounds.   Pulmonary:      Effort: Pulmonary effort is normal.      Breath sounds: Normal breath sounds.   Chest:   Breasts:     Right: No mass, nipple discharge, skin change or tenderness.      Left: No mass, nipple discharge, skin change or tenderness.   Abdominal:      Palpations: Abdomen is soft.      Tenderness: There is no abdominal tenderness.   Genitourinary:     Labia:         Right: No rash or lesion.         Left: No rash or lesion.       Vagina: Normal.      Cervix: Normal.      Uterus: Normal.       Adnexa:         Right: No mass or tenderness.          Left: Tenderness present. No mass or fullness.     Neurological:      Mental Status: She is alert and oriented to person, place, and time.   Psychiatric:         Behavior: Behavior normal.         /60   Ht 158.8 cm (62.52\")   Wt 67.2 kg (148 lb 3.2 oz)   LMP  (LMP Unknown)   BMI 26.66 kg/m²     Assessment & Plan   Diagnoses and all orders for this visit:    1. Encounter for gynecological examination (Primary)  -     Cologuard - Stool, Per Rectum; Future    2. Left lower quadrant abdominal pain  -     Ambulatory Referral to Gastroenterology          Breast self exam technique reviewed and patient encouraged to perform self-exam monthly.  Discussed healthy lifestyle modifications.  Pap smear up to date  Recommended 30 minutes of aerobic exercise five times per " week.  Discussed calcium needs to prevent osteoporosis  Follow up for pelvic ultrasound for LLQ pain

## 2022-11-01 ENCOUNTER — HOSPITAL ENCOUNTER (OUTPATIENT)
Dept: CT IMAGING | Facility: HOSPITAL | Age: 62
Discharge: HOME | End: 2022-11-01
Admitting: INTERNAL MEDICINE

## 2022-11-01 DIAGNOSIS — C34.92 ADENOCARCINOMA OF LEFT LUNG: ICD-10-CM

## 2022-11-01 DIAGNOSIS — C34.11 MALIGNANT NEOPLASM OF UPPER LOBE OF RIGHT LUNG: ICD-10-CM

## 2022-11-01 PROCEDURE — 0 DIATRIZOATE MEGLUMINE & SODIUM PER 1 ML: Performed by: INTERNAL MEDICINE

## 2022-11-01 PROCEDURE — 74177 CT ABD & PELVIS W/CONTRAST: CPT

## 2022-11-01 PROCEDURE — 25010000002 IOPAMIDOL 61 % SOLUTION: Performed by: INTERNAL MEDICINE

## 2022-11-01 PROCEDURE — 71260 CT THORAX DX C+: CPT

## 2022-11-01 PROCEDURE — 82565 ASSAY OF CREATININE: CPT

## 2022-11-01 RX ADMIN — IOPAMIDOL 90 ML: 612 INJECTION, SOLUTION INTRAVENOUS at 15:30

## 2022-11-01 RX ADMIN — DIATRIZOATE MEGLUMINE AND DIATRIZOATE SODIUM 30 ML: 660; 100 LIQUID ORAL; RECTAL at 14:55

## 2022-11-02 LAB — CREAT BLDA-MCNC: 1 MG/DL (ref 0.6–1.3)

## 2022-11-07 DIAGNOSIS — R19.5 POSITIVE COLORECTAL CANCER SCREENING USING COLOGUARD TEST: Primary | ICD-10-CM

## 2022-11-08 NOTE — PROGRESS NOTES
Subjective .  Patient now urged to continue smoking cessation      REASONS FOR FOLLOWUP:    1. Nonsmall cell lung carcinoma, stage IIA (down staged from presumed stage IIIA). The patient received neoadjuvant chemotherapy with carboplatin/Alimta x2. Followup scans showed considerable response. The patient is status post right upper lobectomy and lymphadenectomy (discharged 03/07/2012). Per Thoracic Conference, additional two cycles of adjuvant carboplatin/Alimta was advised and initiated on 04/11/2012. She has since remained in remission.     2.  Chest x-ray February 2004, July 20 2020-left hilar nodule 10 mm reduced to 6 mm    3.  Low-dose CT scan without evidence of recurrent disease 5/17/2021    4.  Patient seen 5/9/2022 with bilateral hip pain, abdominal swelling    5.  Patient assessed by CT chest and pelvis at [facility] 5/24/2022, pulmonary nodule, bilateral adrenal nodules, follow-up scans in 3 months planned, smoking cessation planned    6.  Patient reassessed CT chest abdomen pelvis 11/1/2022-no interval change with metastatic disease in chest abdomen pelvis.  Patient proceeding to smoking cessation.            History of Present Illness    The patient is a 62 y.o. female with the above-mentioned history, who returns the office today for 3-month follow-up.  She remains in observation in regards to her malignancy.  She did undergo surveillance CT scan July 2019 with a left lower lobe nodule, 1.1 cm noted.  She then underwent PET scan 7/24/2019 with no metabolic activity identified.   The patient reports today she overall feels very well.  She does have some chronic shortness of breath on exertion though recovers quickly with rest.  She has been exercising and changed her diet, therefore has lost some weight.  She continues to have a chronic cough with occasional productive clear sputum.  She is attempting to stop smoking, though continues to smoke 1 pack/day.  She was recently prescribed Chantix  by pulmonology.  She denies any new pain.  The patient is next seen January 2, 2020.  Unfortunately she is no longer covered by insurance though she remains, understandably, concerned about a previously noted left lower lobe pulmonary nodule.  A follow-up scan was scheduled though she is not able to cover that currently financially.      Plans were made for reassessment and the patient is next seen February 12, 2020 with repeat chest x-ray showing again a 1 cm lung nodule projecting just lateral to the left hilum between the level of the posterior left eighth and ninth ribs.  Fortunately she has not further symptomatic at this point additionally.  A follow-up chest x-ray was requested and obtained July 20, 2020 fortunately demonstrating a reduction in the left hilar nodule from 10 mm now to 6 mm.  She continues to have ongoing back pain indicating that insurance coverage has lapsed.        We elected to obtain a follow-up chest x-ray the patient is seen March 1, 2021.  Fortunately she is feeling well and repeat chest x-ray February 22, 2021 shows pulmonary hyperexpansion.  The previously noted lung nodules not noted left lower lobe or other suspicious lesion.  We have discussed follow-up low-dose CT scanning.    The patient underwent low-dose CT scan 5/17/2021 showing stable fairly dense pulmonary nodule left lower lobe that was thought to be granulomatous.  There were no other findings that might be suspicious for malignancy.  Patient, fortunately is feeling fair except for chronic back pain.  This is being assessed by her primary care physician with additional films.    The patient is reassessed 11/7/2021.  Mammographic screening 10/11/2021 with a negative, chest x-ray was performed 11/6/2021 and is negative for new abnormalities.  Patient states that Dr. Wiley  MRI of the lumbar spine is reviewed result the patient's current low back pain    The patient is next seen, ever, 5/9/2022 indicating that she has been  having increasing hip pain as well as abdominal swelling.  Her primary care had endeavor to have her undergo repeat scans that are not covered at [facility] and thus we will try to schedule as an outpatient at a [facility] facility.    Patient had her scans performed at [facility] CT abdomen pelvis 5/24/2022 demonstrating indeterminate 8 x 9 mm left lower lobe pulmonary nodule, indeterminate bilateral adrenal gland nodules measuring up to 1.4 cm.  There is no comparison studies done on these examinations.    The patient is seen 6/13/2022 discussed that she had previous scans 2015 showing a left adrenal adenoma but no abnormalities in the right adrenal gland.  Follow-up studies scheduled 3 months intervals [place].  Smoking cessation plan and pulmonary referral planned.    Patient seen by pulmonary medicine 6/17/2022-COPD with mild exacerbation noted, Chantix trial.    The patient is next assessed 11/9/2022 with CT chest abdomen pelvis reviewed showing no clear evidence of metastatic disease to the chest or pelvis, status post right upper lobectomy, 9 mm nodule superior segment left lower lobe without change, new onset metastatic disease in abdomen pelvis with 2 splenic cysts that are new, bilateral adrenal nodules without change.  The patient is seen formally 11/9/2022 fortunately feeling reasonably well though still having extremity pain that may be peripheral vascular disease and she is urged strongly to discontinue smoking altogether at this point.  She is proceeding through nicotine lozenges.  Fortunately her scans, as above, do not demonstrate progressive malignancy.  She does describe a positive Cologuard exam however and has been urged to proceed to colonoscopy which is now being scheduled.    Past Medical History:   Diagnosis Date   • Asthma    • Bleeding of blood vessel 01/05/2011    BRAIN   • Carpal tunnel syndrome on right    • COPD (chronic obstructive pulmonary disease) (HCC)     MODERATE    • DDD (degenerative disc disease), cervical 09/22/2014    MODERATE C6-C7, SEEING DR. EWING   • Drug therapy     lung ca   • Emphysema of lung (HCC)    • Fibroids     UTERINE X 2   • Hx of radiation therapy     lung ca   • Hypertension    • MI (myocardial infarction) (HCC) 06/2009   • Mitral insufficiency    • Non-small cell carcinoma of lung (HCC)     Stabe IIA   • Ovarian cyst     RIGHT   • Pain of right upper extremity 11/18/2015   • Pelvic pain    • Right shoulder pain    • SOB (shortness of breath)    • Thyroid nodule    • Vertigo 05/13/2015   • Weight loss        ONCOLOGIC HISTORY:  (History from previous dates can be found in the separate document.)    Current Outpatient Medications on File Prior to Visit   Medication Sig Dispense Refill   • albuterol sulfate HFA (Ventolin HFA) 108 (90 Base) MCG/ACT inhaler Inhale 2 puffs Every 4 (Four) Hours As Needed for Wheezing. 17 g 3   • naproxen (Naprosyn) 250 MG tablet Take 2 tablets by mouth 2 (Two) Times a Day With Meals. 40 tablet 1     No current facility-administered medications on file prior to visit.       ALLERGIES:     Allergies   Allergen Reactions   • Codeine Irritability       Social History     Socioeconomic History   • Marital status:    • Years of education: College   Tobacco Use   • Smoking status: Every Day     Packs/day: 0.50     Years: 50.00     Pack years: 25.00     Types: Cigarettes     Start date: 1972   • Smokeless tobacco: Current   • Tobacco comments:     11/9/22 - reports 10 cigs/day   Vaping Use   • Vaping Use: Never used   Substance and Sexual Activity   • Alcohol use: Yes     Comment: OCCASIONAL   • Drug use: Yes     Types: Marijuana   • Sexual activity: Not Currently     Partners: Male     Birth control/protection: Post-menopausal         Cancer-related family history includes Brain cancer in her father; Cancer (age of onset: 47) in her mother. There is no history of Breast cancer.      Review of Systems   Constitutional:  "Negative for chills, fatigue and fever.   HENT: Negative for mouth sores and sore throat.    Eyes: Negative for visual disturbance.   Respiratory: Negative for cough, chest tightness, shortness of breath (chronic unchanged) and wheezing.    Cardiovascular: Negative for chest pain and leg swelling.   Gastrointestinal: Positive for abdominal distention. Negative for abdominal pain, constipation and vomiting.   Genitourinary: Negative for dysuria and frequency.   Musculoskeletal: Positive for back pain, joint swelling, myalgias and neck pain.   Neurological: Positive for numbness. Negative for dizziness and weakness.   Hematological: Does not bruise/bleed easily.   Psychiatric/Behavioral: The patient is not nervous/anxious.    All other systems reviewed and are negative.  Review of systems 09/18/2019  unchanged from previous office visit except as updated.       Objective      Vitals:    11/09/22 1018   BP: 125/76   Pulse: 76   Resp: 16   Temp: 97.8 °F (36.6 °C)   TempSrc: Temporal   SpO2: 98%   Weight: 67.1 kg (148 lb)   Height: 158.8 cm (62.52\")   PainSc:   6   PainLoc: Back  Comment: back / neck/ hip pain/ numbness in leg     Current Status 11/9/2022   ECOG score 0       Physical Exam    GENERAL:  female alert and oriented.   SKIN: Warm, dry without rashes, purpura or petechiae.   HEAD: Normocephalic.   EYES: Pupils equal, round and reactive to light. EOMs intact. Conjunctivae normal.   EARS: Hearing intact.   NOSE: Septum midline. No excoriations or nasal discharge.   MOUTH: Tongue is well-papillated; no stomatitis or ulcers. Lips normal.   THROAT: Oropharynx without lesions or exudates.   NECK: Supple with good range of motion; no thyromegaly or masses, no JVD or bruits.   LYMPHATICS: No cervical, supraclavicular adenopathy.   CHEST: Lungs clear to auscultation, prolonged expiratory phase noted bilaterally.   CARDIAC: Regular rate and rhythm without murmurs, rubs or gallops. Bowel sounds " present.  ABDOMEN: Soft, nontender with no organomegaly or masses.   EXTREMITIES: No clubbing, cyanosis or edema.   NEUROLOGICAL: No focal neurological deficits. .      RECENT LABS:  Results from last 7 days   Lab Units 11/09/22  1006   WBC 10*3/mm3 6.69   NEUTROS ABS 10*3/mm3 3.38   HEMOGLOBIN g/dL 14.1   HEMATOCRIT % 43.5   PLATELETS 10*3/mm3 312      SCANNED - IMAGING (05/24/2022)      Assessment plan;        1. Nonsmall cell lung carcinoma, stage IIA (down staged from presumed stage IIIA).  Staging MRI of the brain negative. The patient received neoadjuvant chemotherapy with carboplatin/Alimta x2. Followup scans showed considerable response. The patient is status post right upper lobectomy and lymphadenectomy (discharged 03/07/2012). Per Thoracic Conference, additional two cycles of adjuvant carboplatin/Alimta was advised and initiated on 04/11/2012. She has since remained in remission.    Low-dose screening CT of the chest July 2018 with a 1.1 left lower lobe nodule noted.  Follow-up PET scan the nodule was photopenic.  The patient has a follow-up review now in February 2020 after we have her apply for assistance with [facility].  A chest x-ray be requested in 5 weeks to see the physician in 6 weeks.  This proceeded with patient assessed February 12, 2020 with chest x-ray February 4  not significantly changed from previous.  At this point will reassess again at 6 months with MD, chest x-ray CBC and CMP.     This is reassessed September 30, 2020 with a left hilar nodule having dropped from 10 mm to 6 mm.  We will continue reassessment every 6 months.  The patient's follow-up chest x-ray February 22, 2021 is negative for abnormalities though low-dose CT scan is felt reasonable and be scheduled within the next 3 months.  She will be seen formally after the scan is done.  She agrees with this plan and follow-up.  The patient's follow-up low-dose CT chest done in follow-up 5/17/2021 was negative for  recurrent disease.  There remains stable fairly dense pulmonary nodule left lower lobe thought to be a granuloma.  The patient had a follow-up chest x-ray 11/6/2021 without new abnormalities.  We discussed a repeat low-dose CT scan but she has an MRI possibly scheduled next several months of the lumbar spine.    Paced rhythm at 5/9/2022 with progressive symptoms of abdominal discomfort and swelling, her physical exam is not particularly abnormal but she is also having a little hip pain and has chronic issues with shortness of breath and cough as well as smoking.    Patient underwent scans at [facility] with CT chest abdomen pelvis 5/24/2022 demonstrating indeterminate 8 x 9 mm left lower lobe pulmonary nodule, indeterminate bilateral adrenal gland nodules measuring up to 1.4 cm.  There is no comparison studies done on these examinations.    The patient is seen 6/13/2022 discussed that she had previous scans 2015 showing a left adrenal adenoma but no abnormalities in the right adrenal gland.  Follow-up studies scheduled 3 months intervals [place].  Smoking cessation plan and pulmonary referral planned.  Patient assessed by follow-up CT chest abdomen pelvis 10/12/2022 without evidence of recurrent disease.      2.  COPD.  Currently without symptoms of exacerbation.  She does see pulmonology and a follow-up will be scheduled.      3.  Smoking cessation.  Patient currently continuing with nicotine lozenges                                                                                                                 4. Chronic pain. The patient is seen and managed by pain management.     5.  Recent positive findings for Cologuard, colonoscopy anticipated after GI review in December 2022.        Plan:    *Follow-up with GI as planned, anticipate subsequent colonoscopy    *3 months MD follow-up CBC    *Patient urged, again, to discontinue tobacco altogether considering her current vascular symptoms.

## 2022-11-09 ENCOUNTER — OFFICE VISIT (OUTPATIENT)
Dept: OTHER | Facility: HOSPITAL | Age: 62
End: 2022-11-09

## 2022-11-09 ENCOUNTER — OFFICE VISIT (OUTPATIENT)
Dept: ONCOLOGY | Facility: CLINIC | Age: 62
End: 2022-11-09

## 2022-11-09 ENCOUNTER — LAB (OUTPATIENT)
Dept: LAB | Facility: HOSPITAL | Age: 62
End: 2022-11-09

## 2022-11-09 VITALS
HEIGHT: 63 IN | BODY MASS INDEX: 26.22 KG/M2 | OXYGEN SATURATION: 98 % | RESPIRATION RATE: 16 BRPM | DIASTOLIC BLOOD PRESSURE: 76 MMHG | SYSTOLIC BLOOD PRESSURE: 125 MMHG | TEMPERATURE: 97.8 F | HEART RATE: 76 BPM | WEIGHT: 148 LBS

## 2022-11-09 VITALS — RESPIRATION RATE: 18 BRPM | WEIGHT: 146.8 LBS | BODY MASS INDEX: 26.41 KG/M2

## 2022-11-09 DIAGNOSIS — R05.9 COUGH: ICD-10-CM

## 2022-11-09 DIAGNOSIS — C34.11 MALIGNANT NEOPLASM OF UPPER LOBE OF RIGHT LUNG: ICD-10-CM

## 2022-11-09 DIAGNOSIS — F17.200 TOBACCO USE DISORDER, MODERATE, DEPENDENCE: ICD-10-CM

## 2022-11-09 DIAGNOSIS — J44.9 CHRONIC OBSTRUCTIVE PULMONARY DISEASE, UNSPECIFIED COPD TYPE: ICD-10-CM

## 2022-11-09 DIAGNOSIS — C34.90 ADENOCARCINOMA OF LUNG, UNSPECIFIED LATERALITY: Primary | ICD-10-CM

## 2022-11-09 DIAGNOSIS — F17.200 TOBACCO USE DISORDER, MODERATE, DEPENDENCE: Primary | ICD-10-CM

## 2022-11-09 LAB
ALBUMIN SERPL-MCNC: 4.6 G/DL (ref 3.5–5.2)
ALBUMIN/GLOB SERPL: 1.2 G/DL (ref 1.1–2.4)
ALP SERPL-CCNC: 90 U/L (ref 38–116)
ALT SERPL W P-5'-P-CCNC: 16 U/L (ref 0–33)
ANION GAP SERPL CALCULATED.3IONS-SCNC: 9.8 MMOL/L (ref 5–15)
AST SERPL-CCNC: 21 U/L (ref 0–32)
BASOPHILS # BLD AUTO: 0.05 10*3/MM3 (ref 0–0.2)
BASOPHILS NFR BLD AUTO: 0.7 % (ref 0–1.5)
BILIRUB SERPL-MCNC: 0.4 MG/DL (ref 0.2–1.2)
BUN SERPL-MCNC: 11 MG/DL (ref 6–20)
BUN/CREAT SERPL: 11.6 (ref 7.3–30)
CALCIUM SPEC-SCNC: 10.2 MG/DL (ref 8.5–10.2)
CHLORIDE SERPL-SCNC: 108 MMOL/L (ref 98–107)
CO2 SERPL-SCNC: 26.2 MMOL/L (ref 22–29)
CREAT SERPL-MCNC: 0.95 MG/DL (ref 0.6–1.1)
DEPRECATED RDW RBC AUTO: 40.8 FL (ref 37–54)
EGFRCR SERPLBLD CKD-EPI 2021: 68.3 ML/MIN/1.73
EOSINOPHIL # BLD AUTO: 0.31 10*3/MM3 (ref 0–0.4)
EOSINOPHIL NFR BLD AUTO: 4.6 % (ref 0.3–6.2)
ERYTHROCYTE [DISTWIDTH] IN BLOOD BY AUTOMATED COUNT: 13.6 % (ref 12.3–15.4)
GLOBULIN UR ELPH-MCNC: 3.8 GM/DL (ref 1.8–3.5)
GLUCOSE SERPL-MCNC: 90 MG/DL (ref 74–124)
HCT VFR BLD AUTO: 43.5 % (ref 34–46.6)
HGB BLD-MCNC: 14.1 G/DL (ref 12–15.9)
IMM GRANULOCYTES # BLD AUTO: 0.02 10*3/MM3 (ref 0–0.05)
IMM GRANULOCYTES NFR BLD AUTO: 0.3 % (ref 0–0.5)
LYMPHOCYTES # BLD AUTO: 2.48 10*3/MM3 (ref 0.7–3.1)
LYMPHOCYTES NFR BLD AUTO: 37.1 % (ref 19.6–45.3)
MCH RBC QN AUTO: 26.8 PG (ref 26.6–33)
MCHC RBC AUTO-ENTMCNC: 32.4 G/DL (ref 31.5–35.7)
MCV RBC AUTO: 82.7 FL (ref 79–97)
MONOCYTES # BLD AUTO: 0.45 10*3/MM3 (ref 0.1–0.9)
MONOCYTES NFR BLD AUTO: 6.7 % (ref 5–12)
NEUTROPHILS NFR BLD AUTO: 3.38 10*3/MM3 (ref 1.7–7)
NEUTROPHILS NFR BLD AUTO: 50.6 % (ref 42.7–76)
NRBC BLD AUTO-RTO: 0 /100 WBC (ref 0–0.2)
PLATELET # BLD AUTO: 312 10*3/MM3 (ref 140–450)
PMV BLD AUTO: 8.6 FL (ref 6–12)
POTASSIUM SERPL-SCNC: 4 MMOL/L (ref 3.5–4.7)
PROT SERPL-MCNC: 8.4 G/DL (ref 6.3–8)
RBC # BLD AUTO: 5.26 10*6/MM3 (ref 3.77–5.28)
SODIUM SERPL-SCNC: 144 MMOL/L (ref 134–145)
WBC NRBC COR # BLD: 6.69 10*3/MM3 (ref 3.4–10.8)

## 2022-11-09 PROCEDURE — 99214 OFFICE O/P EST MOD 30 MIN: CPT | Performed by: NURSE PRACTITIONER

## 2022-11-09 PROCEDURE — 36415 COLL VENOUS BLD VENIPUNCTURE: CPT

## 2022-11-09 PROCEDURE — 85025 COMPLETE CBC W/AUTO DIFF WBC: CPT

## 2022-11-09 PROCEDURE — 99407 BEHAV CHNG SMOKING > 10 MIN: CPT | Performed by: NURSE PRACTITIONER

## 2022-11-09 PROCEDURE — 80053 COMPREHEN METABOLIC PANEL: CPT

## 2022-11-09 PROCEDURE — 99214 OFFICE O/P EST MOD 30 MIN: CPT | Performed by: INTERNAL MEDICINE

## 2022-11-09 RX ORDER — POLYETHYLENE GLYCOL 3350 17 G
4 POWDER IN PACKET (EA) ORAL AS NEEDED
Qty: 108 EACH | Refills: 3 | Status: SHIPPED | OUTPATIENT
Start: 2022-11-09 | End: 2022-11-28 | Stop reason: SDUPTHER

## 2022-11-09 NOTE — PROGRESS NOTES
[facility] MULTIDISCIPLINARY CLINIC  SURVIVORSHIP VISIT: IN CLINIC  Smoking Cessation Follow-Up Visit        Yolanda Lee Hatchett is a pleasant 62 y.o. female reviewed today in Multidisciplinary Clinic, for follow-up smoking cessation visit.     HPI  Patient here today prior to scheduled follow-up with Dr. Burns.  She reports generally doing well.  She has had some changes in her insurance which has enabled her to return to the clinic for follow-up at this time.    She continues to report smoking about 10 cigarettes a day.  Was prescribed Chantix by pulmonary.  She reports taking it for about 3 or 4 weeks and then discontinuing the medicine after developing concerns regarding potential side effects however she does report tolerating the Chantix well and denied any difficulties with nausea, sleep disturbances or mood changes.  She understood hallucinations to be a potential side effect that she was most concerned about.    On 11/1/2022 completed CT chest abdomen pelvis which showed Stable 9 mm nodule superior segment left lower lobe without change; no evidence for metastatic disease abdomen or pelvis, 2 splenic cysts measuring 2.3 and 1.4 cm which are new when compared to previous CT of December 2015 and without evidence for solid component recommended to be followed on subsequent exam; bilateral adrenal nodules without change; 3 cm right lower pole renal cyst;Right retrocrural fluid density consistent with a prominent cisterna chyli without change compared with exam 12/01/2015    She does report a positive Cologuard test in October and scheduling for a follow-up colonoscopy is pending.  She denies symptoms of constipation or diarrhea, denies abdominal pain, denies blood in stool or urine.        TREATMENT HISTORY:     Oncology/Hematology History Overview Note    This 59-year-old  female with a history of non-small cell lung carcino ma, having completed both neoadjuvant and then  adjuvant chemotherapy by May 2012. She has returned back for routine assessment and Mediport flushes and fortunately has done well up to this point. She did unfortunately recently have evidence of respirator y infection, which has found very difficult to clear and having increasing sinus symptoms. The record does indicate an assessment on 1/14/13 with Dr. Amaro. He found no evidence of recurrent disease. This included a chest x-ray, PA and lateral done on 1 / 14/13 showing good aeration of bilateral lung fields with no infiltrates, effusion, masses or nodules seen. Again, as the patient returns on 2/25/13 she has been having a respiratory infection that was treated thereafter. She was asked to followup and i s now seen on 7/15/13. She had repeat chest x-ray in May 2013 without abnormality.   When seen 07/15/13 she indicated she lost additional weight since last seen and she has been slowly losing weight. She states she feels well with no additional issues, but wonders why she is losing weight and of course is of some concern.   We had followup scans performed 08/06/13 that fortunately show no evidence of neck lymphadenopathy though shotty nodes remain; in the chest there are no suspicious pulmonary opacities or nodules, no pericardial effusion, and resolution of the previously seen pleural thickening in the lateral aspect of the right middle lobe. No change in node in the right hilum with the node measuring 1.3 x 0.8, no mediastinal lymphadenopathy. In the a bdomen there were no suspicious liver lesions. There continue to be two small uterine fibroids, unchanged, and a slight interval increase in a 2.9 cm right ovarian cyst, previously 2.5 cm. The patient has had no clear abnormalities seen on scans. She s tates she is now currently working on a job that unfortunately is not well ventilated, where there is considerable dust. She has had sinus symptoms as a result of this.   The patient thereafter did quite well in  fact returned to different job which she is currently is still pursuing. She is active, however, doing quite a bit of labor. She did Tommy shopping and had pain in the right lower extremity with a degree of numbness also recognized. Upon rest this did improve, but it is bothering her as to why it ever developed and also the fact that she has been losing weight without any effort. Fortunately this has not been extreme, but is also concerning.   The patient thereafter was asked to undergo repeat studies and underwent repeat CT scan of chest, abdo men, and pelvis on 01/29/2014. These are fortunately without any evidence of recurrent disease. There is a noncalcified 3 mm, stable pulmonary nodule, stable pleural thickening, and retraction of right apex, stable 13 x 8 mm right hilar lymph node, findin g s per abdomen and pelvis with a stable 12 mm left adrenal nodule thought to be adenomatous, right renal cortical cyst, retrocrural lymph node that is approximately the same as it was previous, right ovarian/adnexal cystic nodule that is increased in size f rom 2.9 to 3.6. The patient does have some pelvic pain on occasion that may be related to the right ovarian cystic nodule. Otherwise, she feels reasonably good, though she is not sleeping particularly well, only 3 or 4 hours per night. Her appetite is goo d, i.e., stable, 2 meals a day. She is working routinely at a physical job and though once again notes further weight loss since last being.   The patient thereafter was asked return for followup. When seen 5/6 she states that her job has become a bit more physically taxing and she has lost additional weight since last seen, and I do find that her weight is 107.8 pounds compared to 111.6 on 2/5. She is not having pain but has noted increasing shortness of breath with moderate exertion. Her appetite remains good overall. She has evidently not seen OB/GYN at this point.   The patient was referred to pulmonary medicine  for their assessment. It was felt she had moderate COPD, had her start ProAir and Symbicort and advised to quit smoking. The patient has been t rying to do the latter with the use of any cigarettes and has made some progress. She has lost, however, a moderate degree of additional weight and has been having some right shoulder pain seen upon repetitive use.   The patient went on to see orthopedics, particularly Dr. Healy. His notes from 09/22/2014 are consistent with moderate degenerative disk disease at C6-C7 with loss of intervertebral height with some osteophytic osteophyte formation. He felt she had a potential radiculopathy as well as right carpal tunnel syndrome. She had improved somewhat on steroids. Additional considerations were cervical traction and/or additional diagnostic injections.   The patient thereafter states that as she is tapered off steroids used to stimulate her performance status - her pain has unfortunately become worse, but not to the degree previously that she had been experiencing. She indicates that she is considering going back to Dr. Healy for the injections that he had been previously offering.   The patient was asked to be seen again at 6 month followup, seen 05/13/2015. She relates a recent episode of apparent vertigo that was responsive to meclizine. It has not recurred with this medication.   The patient thereafter returns now for review 11/18/2015. 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 She is beginning to have some pain and swelling of her right upper extremity and wonders “exactly what this means” . The swelling increases during the workday into the night and then reduces by the a.m. but “never back to normal”. She also continues to use meclizine for vertigo with this medication helping her particularly at night.   The patient was asked to undergo general reevaluation via CT scanning. This was  performed 12/01/2015, demonstrated no evidence of metastatic disease in chest, abdomen or pelvis. As the patient is seen it is further noted that she has no obvious bony abnormalities. She states that through her primary care she is being assessed wi th a lumbar spine MRI since she developed further back pain on top of already noted neck pain. This could well be work related and she plans to see him orthopedist Dr. Healy in the next several weeks as well. He had seen her last fall for neck pain.   The patient's been seeing orthopedics including Dr. Camara.  She is evidently not a surgical candidate and has been referred for pain management with a point of this week already planned.  She states incidentally that her dizziness has improved with the current meclizine doses     The patient is now reviewed back November 4.  In the interval she's been seen by pain management and also physical therapy and undergoes treatment on a regular basis.  This is been mostly successful.  Her pain, however, is in part related to off includes quite a bit of labor.   Patient's now seen back in April 21, 2017.  She is, fortunately, doing well overall pain control general performance status.  She states, happily, that she is  and doing well.  She is moved to smoking cessation and is down to 1 pack per week.     Patient seen back May 23, 2018.  Over the last year she's had continued neck and low back pain now being seen by orthopedics.  She's been advised that additional surgery may be necessary that she hopes not to do this.  Chest x-ray in office today is not changed significantly from previous.  The patient is next seen June 26, 2019 and having increasing shortness of breath as well as significant weight gain having been 115 pounds May 23, 2018 and now 142 pounds June 26, 2019.  We discussed that this may clearly be as result of her weight gain but her history is troubling as well.  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           Past Medical History:   Diagnosis Date   • Asthma    • Bleeding of blood vessel 01/05/2011    BRAIN   • Carpal tunnel syndrome on right    • COPD (chronic obstructive pulmonary disease) (HCC)     MODERATE   • DDD (degenerative disc disease), cervical 09/22/2014    MODERATE C6-C7, SEEING DR. EWING   • Drug therapy     lung ca   • Emphysema of lung (HCC)    • Fibroids     UTERINE X 2   • Hx of radiation therapy     lung ca   • Hypertension    • MI (myocardial infarction) (HCC) 06/2009   • Mitral insufficiency    • Non-small cell carcinoma of lung (HCC)     Stabe IIA   • Ovarian cyst     RIGHT   • Pain of right upper extremity 11/18/2015   • Pelvic pain    • Right shoulder pain    • SOB (shortness of breath)    • Thyroid nodule    • Vertigo 05/13/2015   • Weight loss        Past Surgical History:   Procedure Laterality Date   • APPENDECTOMY     • CATARACT EXTRACTION, BILATERAL  03/2019   • LUNG LOBECTOMY Right 2012    upper lobectomy and lymphadenectomy       Social History     Socioeconomic History   • Marital status:    • Years of education: College   Tobacco Use   • Smoking status: Every Day     Packs/day: 0.50     Years: 50.00     Pack years: 25.00     Types: Cigarettes     Start date: 1972   • Smokeless tobacco: Current   • Tobacco comments:     11/9/22 - reports 10 cigs/day   Vaping Use   • Vaping Use: Never used   Substance and Sexual Activity   • Alcohol use: Yes     Comment: OCCASIONAL   • Drug use: Yes     Types: Marijuana   • Sexual activity: Not Currently     Partners: Male     Birth control/protection: Post-menopausal         LDH   Date Value Ref Range Status   01/29/2014 169 135 - 214 U/L Final     Uric Acid   Date Value Ref Range Status   01/29/2014 4.3 2.4 - 5.7 mg/dL Final       Lab Results   Component Value Date    GLUCOSE 60 (L) 05/09/2022    BUN 17 05/09/2022    CREATININE 1.00 11/01/2022    EGFRIFNONA 82 02/05/2021     EGFRIFAFRI 69 11/08/2021    BCR 16.7 05/09/2022    K 3.9 05/09/2022    CO2 24.3 05/09/2022    CALCIUM 10.3 (H) 05/09/2022    PROTENTOTREF 7.8 02/05/2021    ALBUMIN 4.40 05/09/2022    LABIL2 1.2 02/05/2021    AST 19 05/09/2022    ALT 17 05/09/2022       CBC w/diff    CBC w/Diff 5/9/22 6/13/22   WBC 8.10 7.64   RBC 5.11 4.98   Hemoglobin 14.2 13.9   Hematocrit 42.7 41.0   MCV 83.6 82.3   MCH 27.8 27.9   MCHC 33.3 33.9   RDW 13.8 12.9   Platelets 309 253   Neutrophil Rel % 51.6 56.2   Immature Granulocyte Rel % 0.2 0.7 (A)   Lymphocyte Rel % 36.5 31.3   Monocyte Rel % 7.9 7.6   Eosinophil Rel % 3.3 3.7   Basophil Rel % 0.5 0.5   (A) Abnormal value              Allergies as of 11/09/2022 - Reviewed 10/19/2022   Allergen Reaction Noted   • Codeine Irritability 10/11/2021       MEDICATIONS:  Medication list reviewed today    Review of Systems   Constitutional: Negative for activity change, appetite change and unexpected weight change.   Respiratory: Positive for shortness of breath (with mild exertioon, resolves with rest). Negative for cough.    Cardiovascular: Negative for chest pain.   Gastrointestinal: Negative for blood in stool, constipation and diarrhea.   Genitourinary: Negative for hematuria.   Musculoskeletal: Negative for arthralgias and myalgias.   Psychiatric/Behavioral: Negative for dysphoric mood and sleep disturbance. The patient is not nervous/anxious.        Resp 18   Wt 66.6 kg (146 lb 12.8 oz)   LMP  (LMP Unknown)   BMI 26.41 kg/m²     Wt Readings from Last 3 Encounters:   11/09/22 66.6 kg (146 lb 12.8 oz)   10/19/22 67.2 kg (148 lb 3.2 oz)   06/13/22 65.6 kg (144 lb 11.2 oz)       Pain Score    11/09/22 0949   PainSc: 0-No pain       Physical Exam  Constitutional:       Appearance: Normal appearance. She is well-groomed.   HENT:      Head: Normocephalic and atraumatic.   Pulmonary:      Effort: Pulmonary effort is normal.   Skin:     General: Skin is warm and dry.   Neurological:      Mental  Status: She is alert and oriented to person, place, and time.   Psychiatric:         Attention and Perception: Attention and perception normal.         Mood and Affect: Mood and affect normal.         Speech: Speech normal.         Behavior: Behavior normal. Behavior is cooperative.         Thought Content: Thought content normal.         Cognition and Memory: Cognition normal.         Judgment: Judgment normal.           DISCUSSION HELD TODAY:     QUIT PLAN (STAR):  Set quit date: TARGET PRACTICE QUIT DATE: 9/16/22    Tell friends/family: encouraged to enlist support from family    Anticipate challenges: unsure of how to begin. Discussed establishing a target date for a practice quit using nicotine lozenges    Remove tobacco from environment: we have previously discussed moving all smoking outside the house     Medication plan: Patient with negative attribution towards Chantix and fearful of potential side effects of hallucinations.  We discussed that I would not expect hallucinations however we would want to monitor closely for any mood changes, development of suicidal thoughts, sleep disturbances with nightmares and or nausea.  She would like to consider alternatives.    We have previously discussed nicotine lozenges.  She does demonstrate a low to moderate dependence on nicotine however she did initiate smoking at age 12 which is associated with stronger levels of nicotine dependence.  We discussed use of 4 mg lozenges.  Reviewed moisten and park technique. Reviewed potential side effects of nicotine lozenge including hiccups, heartburn, nausea, headache, cough, hypertension, flatulence and insomnia.    It looks like her current insurer does offer some coverage of nicotine replacement products.  We will send Rx to patient pharmacy and advised her should she find her out-of-pocket to be cost prohibitive to call me so we can make alternate arrangements.    Practical counseling: She has no previous experience with  nicotine lozenges.  We discussed a target at practice quit date for about 1 week from now in which she will wake up and begin using the lozenges as she is making her cup of coffee.  I have asked her to record the number of lozenges she uses for this attempt a number of cigarettes smoked.  We will review this attempt when she returns in follow-up and continue to tailor her quit plan from there.                   Plan and recommendations:  1. Nicotine lozenges 4 mg  2. Practice quit with lozenges targeting 1 week from today  3. I have asked her to call if any troubles obtaining her medication, concerns about side effects or tolerance, or concerns about efficacy so we can address these before next follow-up.  4. Call my office as needed at [phone] for additional information, resources or support.        ICD-10-CM ICD-9-CM   1. Tobacco use disorder, moderate, dependence  F17.200 305.1       No orders of the defined types were placed in this encounter.      I spent 30 minutes caring for this patient on this date of service by face-to-face counseling. This time includes time spent by me in the following activities: preparing for the visit, reviewing tests, obtaining and/or reviewing a separately obtained history, performing a medically appropriate examination and/or evaluation, counseling and educating the patient/family/caregiver, ordering medications, tests, or procedures, referring and communicating with other health care professionals, documenting information in the medical record and care coordination     I spent 15 minutes on the separately reported service of smoking cessation including selection of cessation medication, discussion of proper use, side effects, managing cravings, setting quit date. This time is not included in the time used to support the E/M service also reported today.

## 2022-11-28 ENCOUNTER — OFFICE VISIT (OUTPATIENT)
Dept: OTHER | Facility: HOSPITAL | Age: 62
End: 2022-11-28

## 2022-11-28 ENCOUNTER — TELEPHONE (OUTPATIENT)
Dept: OTHER | Facility: HOSPITAL | Age: 62
End: 2022-11-28

## 2022-11-28 VITALS
DIASTOLIC BLOOD PRESSURE: 79 MMHG | HEART RATE: 82 BPM | TEMPERATURE: 97.7 F | SYSTOLIC BLOOD PRESSURE: 138 MMHG | RESPIRATION RATE: 18 BRPM | OXYGEN SATURATION: 94 %

## 2022-11-28 DIAGNOSIS — F17.200 TOBACCO USE DISORDER, MODERATE, DEPENDENCE: ICD-10-CM

## 2022-11-28 PROCEDURE — 99406 BEHAV CHNG SMOKING 3-10 MIN: CPT | Performed by: NURSE PRACTITIONER

## 2022-11-28 PROCEDURE — 99214 OFFICE O/P EST MOD 30 MIN: CPT | Performed by: NURSE PRACTITIONER

## 2022-11-28 RX ORDER — POLYETHYLENE GLYCOL 3350 17 G
4 POWDER IN PACKET (EA) ORAL AS NEEDED
Qty: 108 EACH | Refills: 3 | Status: SHIPPED | OUTPATIENT
Start: 2022-11-28

## 2022-11-28 NOTE — PROGRESS NOTES
[facility] MULTIDISCIPLINARY CLINIC  SURVIVORSHIP VISIT: IN CLINIC  Smoking Cessation Follow-Up Visit        Yolanda Lee Hatchett is a pleasant 62 y.o. female reviewed today in Multidisciplinary Clinic, for follow-up smoking cessation visit.     HPI  Patient here today for follow-up to discuss progress with smoking cessation.  She has begun using the 4 mg nicotine lozenges.  She reports currently using 3 lozenges a day and continues to smoke 3 cigarettes a day.  She feels that this is controlling her cravings.  When she first initiated the lozenges she did have some nausea and stopped them for few days and when she resumed she had no more troubles with that.       TREATMENT HISTORY:     Oncology/Hematology History Overview Note    This 59-year-old  female with a history of non-small cell lung carcino ma, having completed both neoadjuvant and then adjuvant chemotherapy by May 2012. She has returned back for routine assessment and Mediport flushes and fortunately has done well up to this point. She did unfortunately recently have evidence of respirator y infection, which has found very difficult to clear and having increasing sinus symptoms. The record does indicate an assessment on 1/14/13 with Dr. Amaro. He found no evidence of recurrent disease. This included a chest x-ray, PA and lateral done on 1 / 14/13 showing good aeration of bilateral lung fields with no infiltrates, effusion, masses or nodules seen. Again, as the patient returns on 2/25/13 she has been having a respiratory infection that was treated thereafter. She was asked to followup and i s now seen on 7/15/13. She had repeat chest x-ray in May 2013 without abnormality.   When seen 07/15/13 she indicated she lost additional weight since last seen and she has been slowly losing weight. She states she feels well with no additional issues, but wonders why she is losing weight and of course is of some concern.   We had followup  scans performed 08/06/13 that fortunately show no evidence of neck lymphadenopathy though shotty nodes remain; in the chest there are no suspicious pulmonary opacities or nodules, no pericardial effusion, and resolution of the previously seen pleural thickening in the lateral aspect of the right middle lobe. No change in node in the right hilum with the node measuring 1.3 x 0.8, no mediastinal lymphadenopathy. In the a bdomen there were no suspicious liver lesions. There continue to be two small uterine fibroids, unchanged, and a slight interval increase in a 2.9 cm right ovarian cyst, previously 2.5 cm. The patient has had no clear abnormalities seen on scans. She s tates she is now currently working on a job that unfortunately is not well ventilated, where there is considerable dust. She has had sinus symptoms as a result of this.   The patient thereafter did quite well in fact returned to different job which she is currently is still pursuing. She is active, however, doing quite a bit of labor. She did Tommy shopping and had pain in the right lower extremity with a degree of numbness also recognized. Upon rest this did improve, but it is bothering her as to why it ever developed and also the fact that she has been losing weight without any effort. Fortunately this has not been extreme, but is also concerning.   The patient thereafter was asked to undergo repeat studies and underwent repeat CT scan of chest, abdo men, and pelvis on 01/29/2014. These are fortunately without any evidence of recurrent disease. There is a noncalcified 3 mm, stable pulmonary nodule, stable pleural thickening, and retraction of right apex, stable 13 x 8 mm right hilar lymph node, findin g s per abdomen and pelvis with a stable 12 mm left adrenal nodule thought to be adenomatous, right renal cortical cyst, retrocrural lymph node that is approximately the same as it was previous, right ovarian/adnexal cystic nodule that is increased  in size f rom 2.9 to 3.6. The patient does have some pelvic pain on occasion that may be related to the right ovarian cystic nodule. Otherwise, she feels reasonably good, though she is not sleeping particularly well, only 3 or 4 hours per night. Her appetite is goo d, i.e., stable, 2 meals a day. She is working routinely at a physical job and though once again notes further weight loss since last being.   The patient thereafter was asked return for followup. When seen 5/6 she states that her job has become a bit more physically taxing and she has lost additional weight since last seen, and I do find that her weight is 107.8 pounds compared to 111.6 on 2/5. She is not having pain but has noted increasing shortness of breath with moderate exertion. Her appetite remains good overall. She has evidently not seen OB/GYN at this point.   The patient was referred to pulmonary medicine for their assessment. It was felt she had moderate COPD, had her start ProAir and Symbicort and advised to quit smoking. The patient has been t rying to do the latter with the use of any cigarettes and has made some progress. She has lost, however, a moderate degree of additional weight and has been having some right shoulder pain seen upon repetitive use.   The patient went on to see orthopedics, particularly Dr. Healy. His notes from 09/22/2014 are consistent with moderate degenerative disk disease at C6-C7 with loss of intervertebral height with some osteophytic osteophyte formation. He felt she had a potential radiculopathy as well as right carpal tunnel syndrome. She had improved somewhat on steroids. Additional considerations were cervical traction and/or additional diagnostic injections.   The patient thereafter states that as she is tapered off steroids used to stimulate her performance status - her pain has unfortunately become worse, but not to the degree previously that she had been experiencing. She indicates that she is  considering going back to Dr. Healy for the injections that he had been previously offering.   The patient was asked to be seen again at 6 month followup, seen 05/13/2015. She relates a recent episode of apparent vertigo that was responsive to meclizine. It has not recurred with this medication.   The patient thereafter returns now for review 11/18/2015. 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 She is beginning to have some pain and swelling of her right upper extremity and wonders “exactly what this means” . The swelling increases during the workday into the night and then reduces by the a.m. but “never back to normal”. She also continues to use meclizine for vertigo with this medication helping her particularly at night.   The patient was asked to undergo general reevaluation via CT scanning. This was performed 12/01/2015, demonstrated no evidence of metastatic disease in chest, abdomen or pelvis. As the patient is seen it is further noted that she has no obvious bony abnormalities. She states that through her primary care she is being assessed wi th a lumbar spine MRI since she developed further back pain on top of already noted neck pain. This could well be work related and she plans to see him orthopedist Dr. Healy in the next several weeks as well. He had seen her last fall for neck pain.   The patient's been seeing orthopedics including Dr. Camara.  She is evidently not a surgical candidate and has been referred for pain management with a point of this week already planned.  She states incidentally that her dizziness has improved with the current meclizine doses     The patient is now reviewed back November 4.  In the interval she's been seen by pain management and also physical therapy and undergoes treatment on a regular basis.  This is been mostly successful.  Her pain, however, is in part related to off includes quite a bit of  labor.   Patient's now seen back in April 21, 2017.  She is, fortunately, doing well overall pain control general performance status.  She states, happily, that she is  and doing well.  She is moved to smoking cessation and is down to 1 pack per week.     Patient seen back May 23, 2018.  Over the last year she's had continued neck and low back pain now being seen by orthopedics.  She's been advised that additional surgery may be necessary that she hopes not to do this.  Chest x-ray in office today is not changed significantly from previous.  The patient is next seen June 26, 2019 and having increasing shortness of breath as well as significant weight gain having been 115 pounds May 23, 2018 and now 142 pounds June 26, 2019.  We discussed that this may clearly be as result of her weight gain but her history is troubling as well.  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           Past Medical History:   Diagnosis Date   • Asthma    • Bleeding of blood vessel 01/05/2011    BRAIN   • Carpal tunnel syndrome on right    • COPD (chronic obstructive pulmonary disease) ([facility])     MODERATE   • DDD (degenerative disc disease), cervical 09/22/2014    MODERATE C6-C7, SEEING DR. EWING   • Drug therapy     lung ca   • Emphysema of lung (HCC)    • Fibroids     UTERINE X 2   • Hx of radiation therapy     lung ca   • Hypertension    • MI (myocardial infarction) (HCC) 06/2009   • Mitral insufficiency    • Non-small cell carcinoma of lung (HCC)     Stabe IIA   • Ovarian cyst     RIGHT   • Pain of right upper extremity 11/18/2015   • Pelvic pain    • Right shoulder pain    • SOB (shortness of breath)    • Thyroid nodule    • Vertigo 05/13/2015   • Weight loss        Past Surgical History:   Procedure Laterality Date   • APPENDECTOMY     • CATARACT EXTRACTION, BILATERAL  03/2019   • LUNG LOBECTOMY Right 2012    upper lobectomy and lymphadenectomy       Social  History     Socioeconomic History   • Marital status:    • Years of education: College   Tobacco Use   • Smoking status: Every Day     Packs/day: 0.50     Years: 50.00     Pack years: 25.00     Types: Cigarettes     Start date: 1972   • Smokeless tobacco: Current   • Tobacco comments:     11/28/22 - reports 3 cigs/day + 3 lozenges/day   Vaping Use   • Vaping Use: Never used   Substance and Sexual Activity   • Alcohol use: Yes     Comment: OCCASIONAL   • Drug use: Yes     Types: Marijuana   • Sexual activity: Not Currently     Partners: Male     Birth control/protection: Post-menopausal         LDH   Date Value Ref Range Status   01/29/2014 169 135 - 214 U/L Final     Uric Acid   Date Value Ref Range Status   01/29/2014 4.3 2.4 - 5.7 mg/dL Final       Lab Results   Component Value Date    GLUCOSE 90 11/09/2022    BUN 11 11/09/2022    CREATININE 0.95 11/09/2022    EGFRIFNONA 82 02/05/2021    EGFRIFAFRI 69 11/08/2021    BCR 11.6 11/09/2022    K 4.0 11/09/2022    CO2 26.2 11/09/2022    CALCIUM 10.2 11/09/2022    PROTENTOTREF 7.8 02/05/2021    ALBUMIN 4.60 11/09/2022    LABIL2 1.2 02/05/2021    AST 21 11/09/2022    ALT 16 11/09/2022       CBC w/diff    CBC w/Diff 5/9/22 6/13/22 11/9/22   WBC 8.10 7.64 6.69   RBC 5.11 4.98 5.26   Hemoglobin 14.2 13.9 14.1   Hematocrit 42.7 41.0 43.5   MCV 83.6 82.3 82.7   MCH 27.8 27.9 26.8   MCHC 33.3 33.9 32.4   RDW 13.8 12.9 13.6   Platelets 309 253 312   Neutrophil Rel % 51.6 56.2 50.6   Immature Granulocyte Rel % 0.2 0.7 (A) 0.3   Lymphocyte Rel % 36.5 31.3 37.1   Monocyte Rel % 7.9 7.6 6.7   Eosinophil Rel % 3.3 3.7 4.6   Basophil Rel % 0.5 0.5 0.7   (A) Abnormal value              Allergies as of 11/28/2022 - Reviewed 11/09/2022   Allergen Reaction Noted   • Codeine Irritability 10/11/2021       MEDICATIONS:  Medication list reviewed today    Review of Systems   Constitutional: Negative for activity change, appetite change and unexpected weight change.   Respiratory:  Positive for shortness of breath (With mild exertion, resolves with rest).    Cardiovascular: Negative for leg swelling.   Gastrointestinal: Negative for constipation and diarrhea.   Psychiatric/Behavioral: Negative for decreased concentration and dysphoric mood. The patient is not nervous/anxious.        /79   Pulse 82   Temp 97.7 °F (36.5 °C) (Temporal)   Resp 18   LMP  (LMP Unknown)   SpO2 94% Comment: Room air    Wt Readings from Last 3 Encounters:   11/09/22 67.1 kg (148 lb)   11/09/22 66.6 kg (146 lb 12.8 oz)   10/19/22 67.2 kg (148 lb 3.2 oz)       Pain Score    11/28/22 0949   PainSc: 0-No pain         Physical Exam  Constitutional:       Appearance: Normal appearance. She is well-groomed.   HENT:      Head: Normocephalic and atraumatic.   Pulmonary:      Effort: Pulmonary effort is normal.   Skin:     General: Skin is warm and dry.   Neurological:      Mental Status: She is alert and oriented to person, place, and time.   Psychiatric:         Attention and Perception: Attention and perception normal.         Mood and Affect: Mood normal.         Speech: Speech normal.         Behavior: Behavior normal. Behavior is cooperative.         Thought Content: Thought content normal.         Cognition and Memory: Cognition and memory normal.         Judgment: Judgment normal.           DISCUSSION HELD TODAY:   Nicotine lozenge 4 mg start: 11/12/22  Target quit date: 3/8/2023    Problems identified:  1. Her family has been extremely supportive especially her children.  Her spouse is also supporting her by limiting smoking to 1 place in the house away from [facility].  2. Currently using for nicotine lozenges of the 4 mg strength daily.  She had some nausea initially but this is since resolved.  We reviewed technique for using lozenges including avoiding eating or drinking with lozenge in place.  We discussed continuing to taper daily cigarette intake to achieve target of 0 cigarettes/day.  3. She was having  some lower extremity pain felt to be vascular in origin and she states this is better controlled since starting low-dose aspirin daily.  4. Patient unsure of next steps regarding colonoscopy.  We reviewed she is scheduled with Dr. Sam on 12/28 and I provided her that offices contact information      Plan and recommendations:  1. Continue nicotine lozenges 4 mg, we discussed goal of 6 doses of nicotine daily with gradually eliminating the 3 cigarettes and replacing them with lozenges.  2. GI consultation is scheduled 12/28/2022  3. Follow-up Dr. Burns 2/15/2022  4. Call my office as needed at [phone] for additional information, resources or support.        ICD-10-CM ICD-9-CM   1. Tobacco use disorder, moderate, dependence  F17.200 305.1       No orders of the defined types were placed in this encounter.      I spent 35 minutes caring for this patient on this date of service by face-to-face counseling. This time includes time spent by me in the following activities: preparing for the visit, reviewing tests, obtaining and/or reviewing a separately obtained history, performing a medically appropriate examination and/or evaluation, counseling and educating the patient/family/caregiver, ordering medications, tests, or procedures, referring and communicating with other health care professionals, documenting information in the medical record and care coordination     I spent 10 minutes on the separately reported service of smoking cessation including review of proper medication use, side effects, controlling cravings, setting a quit date. This time is not included in the time used to support the E/M service also reported today.

## 2022-12-13 ENCOUNTER — OFFICE VISIT (OUTPATIENT)
Dept: OTHER | Facility: HOSPITAL | Age: 62
End: 2022-12-13

## 2022-12-13 DIAGNOSIS — F17.200 TOBACCO USE DISORDER, MODERATE, DEPENDENCE: Primary | ICD-10-CM

## 2022-12-13 PROCEDURE — 99441 PR PHYS/QHP TELEPHONE EVALUATION 5-10 MIN: CPT | Performed by: NURSE PRACTITIONER

## 2022-12-13 NOTE — PROGRESS NOTES
[facility] MULTIDISCIPLINARY CLINIC  SURVIVORSHIP VISIT: PHONE  Smoking Cessation Follow-Up Visit        Yolanda Lee Hatchett is a pleasant 62 y.o. female reviewed today by PHONE, for follow-up smoking cessation visit.   You have chosen to receive care through a telephone visit. Do you consent to use a telephone visit for your medical care today? Yes    HPI  In the interim Rebeca has continued to maintain her current daily cigarette intake of 3/day, continues about three, 4 mg nicotine lozenges as well without nausea or other side effects. She has been talking to a friend about her progress and now the friend is also wanting to work on smoking cessation along with her.      TREATMENT HISTORY:     Oncology/Hematology History Overview Note    This 59-year-old  female with a history of non-small cell lung carcino ma, having completed both neoadjuvant and then adjuvant chemotherapy by May 2012. She has returned back for routine assessment and Mediport flushes and fortunately has done well up to this point. She did unfortunately recently have evidence of respirator y infection, which has found very difficult to clear and having increasing sinus symptoms. The record does indicate an assessment on 1/14/13 with Dr. Amaro. He found no evidence of recurrent disease. This included a chest x-ray, PA and lateral done on 1 / 14/13 showing good aeration of bilateral lung fields with no infiltrates, effusion, masses or nodules seen. Again, as the patient returns on 2/25/13 she has been having a respiratory infection that was treated thereafter. She was asked to followup and i s now seen on 7/15/13. She had repeat chest x-ray in May 2013 without abnormality.   When seen 07/15/13 she indicated she lost additional weight since last seen and she has been slowly losing weight. She states she feels well with no additional issues, but wonders why she is losing weight and of course is of some concern.   We  had followup scans performed 08/06/13 that fortunately show no evidence of neck lymphadenopathy though shotty nodes remain; in the chest there are no suspicious pulmonary opacities or nodules, no pericardial effusion, and resolution of the previously seen pleural thickening in the lateral aspect of the right middle lobe. No change in node in the right hilum with the node measuring 1.3 x 0.8, no mediastinal lymphadenopathy. In the a bdomen there were no suspicious liver lesions. There continue to be two small uterine fibroids, unchanged, and a slight interval increase in a 2.9 cm right ovarian cyst, previously 2.5 cm. The patient has had no clear abnormalities seen on scans. She s tates she is now currently working on a job that unfortunately is not well ventilated, where there is considerable dust. She has had sinus symptoms as a result of this.   The patient thereafter did quite well in fact returned to different job which she is currently is still pursuing. She is active, however, doing quite a bit of labor. She did Tommy shopping and had pain in the right lower extremity with a degree of numbness also recognized. Upon rest this did improve, but it is bothering her as to why it ever developed and also the fact that she has been losing weight without any effort. Fortunately this has not been extreme, but is also concerning.   The patient thereafter was asked to undergo repeat studies and underwent repeat CT scan of chest, abdo men, and pelvis on 01/29/2014. These are fortunately without any evidence of recurrent disease. There is a noncalcified 3 mm, stable pulmonary nodule, stable pleural thickening, and retraction of right apex, stable 13 x 8 mm right hilar lymph node, findin g s per abdomen and pelvis with a stable 12 mm left adrenal nodule thought to be adenomatous, right renal cortical cyst, retrocrural lymph node that is approximately the same as it was previous, right ovarian/adnexal cystic nodule that  is increased in size f rom 2.9 to 3.6. The patient does have some pelvic pain on occasion that may be related to the right ovarian cystic nodule. Otherwise, she feels reasonably good, though she is not sleeping particularly well, only 3 or 4 hours per night. Her appetite is goo d, i.e., stable, 2 meals a day. She is working routinely at a physical job and though once again notes further weight loss since last being.   The patient thereafter was asked return for followup. When seen 5/6 she states that her job has become a bit more physically taxing and she has lost additional weight since last seen, and I do find that her weight is 107.8 pounds compared to 111.6 on 2/5. She is not having pain but has noted increasing shortness of breath with moderate exertion. Her appetite remains good overall. She has evidently not seen OB/GYN at this point.   The patient was referred to pulmonary medicine for their assessment. It was felt she had moderate COPD, had her start ProAir and Symbicort and advised to quit smoking. The patient has been t rying to do the latter with the use of any cigarettes and has made some progress. She has lost, however, a moderate degree of additional weight and has been having some right shoulder pain seen upon repetitive use.   The patient went on to see orthopedics, particularly Dr. Healy. His notes from 09/22/2014 are consistent with moderate degenerative disk disease at C6-C7 with loss of intervertebral height with some osteophytic osteophyte formation. He felt she had a potential radiculopathy as well as right carpal tunnel syndrome. She had improved somewhat on steroids. Additional considerations were cervical traction and/or additional diagnostic injections.   The patient thereafter states that as she is tapered off steroids used to stimulate her performance status - her pain has unfortunately become worse, but not to the degree previously that she had been experiencing. She indicates that  she is considering going back to Dr. Healy for the injections that he had been previously offering.   The patient was asked to be seen again at 6 month followup, seen 05/13/2015. She relates a recent episode of apparent vertigo that was responsive to meclizine. It has not recurred with this medication.   The patient thereafter returns now for review 11/18/2015. 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 She is beginning to have some pain and swelling of her right upper extremity and wonders “exactly what this means” . The swelling increases during the workday into the night and then reduces by the a.m. but “never back to normal”. She also continues to use meclizine for vertigo with this medication helping her particularly at night.   The patient was asked to undergo general reevaluation via CT scanning. This was performed 12/01/2015, demonstrated no evidence of metastatic disease in chest, abdomen or pelvis. As the patient is seen it is further noted that she has no obvious bony abnormalities. She states that through her primary care she is being assessed wi th a lumbar spine MRI since she developed further back pain on top of already noted neck pain. This could well be work related and she plans to see him orthopedist Dr. Healy in the next several weeks as well. He had seen her last fall for neck pain.   The patient's been seeing orthopedics including Dr. Camara.  She is evidently not a surgical candidate and has been referred for pain management with a point of this week already planned.  She states incidentally that her dizziness has improved with the current meclizine doses     The patient is now reviewed back November 4.  In the interval she's been seen by pain management and also physical therapy and undergoes treatment on a regular basis.  This is been mostly successful.  Her pain, however, is in part related to off includes quite a  bit of labor.   Patient's now seen back in April 21, 2017.  She is, fortunately, doing well overall pain control general performance status.  She states, happily, that she is  and doing well.  She is moved to smoking cessation and is down to 1 pack per week.     Patient seen back May 23, 2018.  Over the last year she's had continued neck and low back pain now being seen by orthopedics.  She's been advised that additional surgery may be necessary that she hopes not to do this.  Chest x-ray in office today is not changed significantly from previous.  The patient is next seen June 26, 2019 and having increasing shortness of breath as well as significant weight gain having been 115 pounds May 23, 2018 and now 142 pounds June 26, 2019.  We discussed that this may clearly be as result of her weight gain but her history is troubling as well.  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           Past Medical History:   Diagnosis Date   • Asthma    • Bleeding of blood vessel 01/05/2011    BRAIN   • Carpal tunnel syndrome on right    • COPD (chronic obstructive pulmonary disease) (HCC)     MODERATE   • DDD (degenerative disc disease), cervical 09/22/2014    MODERATE C6-C7, SEEING DR. EWING   • Drug therapy     lung ca   • Emphysema of lung (HCC)    • Fibroids     UTERINE X 2   • Hx of radiation therapy     lung ca   • Hypertension    • MI (myocardial infarction) (HCC) 06/2009   • Mitral insufficiency    • Non-small cell carcinoma of lung (HCC)     Stabe IIA   • Ovarian cyst     RIGHT   • Pain of right upper extremity 11/18/2015   • Pelvic pain    • Right shoulder pain    • SOB (shortness of breath)    • Thyroid nodule    • Vertigo 05/13/2015   • Weight loss        Past Surgical History:   Procedure Laterality Date   • APPENDECTOMY     • CATARACT EXTRACTION, BILATERAL  03/2019   • LUNG LOBECTOMY Right 2012    upper lobectomy and lymphadenectomy        Social History     Socioeconomic History   • Marital status:    • Years of education: College   Tobacco Use   • Smoking status: Every Day     Packs/day: 0.50     Years: 50.00     Pack years: 25.00     Types: Cigarettes     Start date: 1972   • Smokeless tobacco: Current   • Tobacco comments:     11/28/22 - reports 3 cigs/day + 3 lozenges/day   Vaping Use   • Vaping Use: Never used   Substance and Sexual Activity   • Alcohol use: Yes     Comment: OCCASIONAL   • Drug use: Yes     Types: Marijuana   • Sexual activity: Not Currently     Partners: Male     Birth control/protection: Post-menopausal         LDH   Date Value Ref Range Status   01/29/2014 169 135 - 214 U/L Final     Uric Acid   Date Value Ref Range Status   01/29/2014 4.3 2.4 - 5.7 mg/dL Final       Lab Results   Component Value Date    GLUCOSE 90 11/09/2022    BUN 11 11/09/2022    CREATININE 0.95 11/09/2022    EGFRIFNONA 82 02/05/2021    EGFRIFAFRI 69 11/08/2021    BCR 11.6 11/09/2022    K 4.0 11/09/2022    CO2 26.2 11/09/2022    CALCIUM 10.2 11/09/2022    PROTENTOTREF 7.8 02/05/2021    ALBUMIN 4.60 11/09/2022    LABIL2 1.2 02/05/2021    AST 21 11/09/2022    ALT 16 11/09/2022       CBC w/diff    CBC w/Diff 5/9/22 6/13/22 11/9/22   WBC 8.10 7.64 6.69   RBC 5.11 4.98 5.26   Hemoglobin 14.2 13.9 14.1   Hematocrit 42.7 41.0 43.5   MCV 83.6 82.3 82.7   MCH 27.8 27.9 26.8   MCHC 33.3 33.9 32.4   RDW 13.8 12.9 13.6   Platelets 309 253 312   Neutrophil Rel % 51.6 56.2 50.6   Immature Granulocyte Rel % 0.2 0.7 (A) 0.3   Lymphocyte Rel % 36.5 31.3 37.1   Monocyte Rel % 7.9 7.6 6.7   Eosinophil Rel % 3.3 3.7 4.6   Basophil Rel % 0.5 0.5 0.7   (A) Abnormal value              Allergies as of 12/13/2022 - Reviewed 11/28/2022   Allergen Reaction Noted   • Codeine Irritability 10/11/2021       MEDICATIONS:  Medication list reviewed today    Review of Systems   Constitutional: Negative for activity change, appetite change and unexpected weight change.   HENT:  Negative for mouth sores.    Respiratory: Positive for shortness of breath (with exertion).    Cardiovascular: Negative for chest pain and leg swelling.   Gastrointestinal: Negative for constipation and diarrhea.   Psychiatric/Behavioral: Negative for decreased concentration and dysphoric mood.       LMP  (LMP Unknown)     Wt Readings from Last 3 Encounters:   11/09/22 67.1 kg (148 lb)   11/09/22 66.6 kg (146 lb 12.8 oz)   10/19/22 67.2 kg (148 lb 3.2 oz)       Pain Score    12/13/22 1538   PainSc: 0-No pain         Physical Exam  Pulmonary:      Effort: Pulmonary effort is normal.   Neurological:      Mental Status: She is alert and oriented to person, place, and time.   Psychiatric:         Attention and Perception: Attention and perception normal.         Mood and Affect: Mood and affect normal.         Speech: Speech normal.         Behavior: Behavior normal. Behavior is cooperative.         Thought Content: Thought content normal.         Cognition and Memory: Cognition normal.         Judgment: Judgment normal.           DISCUSSION HELD TODAY:   Nicotine lozenge 4 mg start: 11/12/22  Target quit date: 3/8/2023    Problems identified:  1. Continues to work towards target quit date of her birthday in [birth year]. Has maintained current level of cigarette/lozenge intake at 3/3 daily. Current goals discussed to continue to work on cognitive and behavioral changes while maintaining current level of cigarette intake through the holidays. Is getting support from a friend who is working towards quitting with her.  2. We discussed she does not have to feel limited to three lozenges, ok to use more frequently with increased urges to smoke. Discussed FDA approved nicotine replacement products will always be preferable to a cigarette, particularly early in her abstinence to prevent relapse.      Plan and recommendations:  1. We have arranged phone follow up for early February   2. Continue 4 mg nicotine lozenges  3. GI  consultation is scheduled 12/28/2022  4. Follow-up Dr. Burns 2/15/2022  5. Call my office as needed at [phone] for additional information, resources or support.        ICD-10-CM ICD-9-CM   1. Tobacco use disorder, moderate, dependence  F17.200 305.1       No orders of the defined types were placed in this encounter.      I spent 6 minutes caring for this patient on this date of service by PHONE

## 2022-12-28 ENCOUNTER — TELEPHONE (OUTPATIENT)
Dept: GASTROENTEROLOGY | Facility: CLINIC | Age: 62
End: 2022-12-28

## 2022-12-28 ENCOUNTER — OFFICE VISIT (OUTPATIENT)
Dept: GASTROENTEROLOGY | Facility: CLINIC | Age: 62
End: 2022-12-28

## 2022-12-28 VITALS
WEIGHT: 148.7 LBS | SYSTOLIC BLOOD PRESSURE: 117 MMHG | BODY MASS INDEX: 27.36 KG/M2 | HEART RATE: 80 BPM | HEIGHT: 62 IN | DIASTOLIC BLOOD PRESSURE: 72 MMHG | TEMPERATURE: 96.2 F

## 2022-12-28 DIAGNOSIS — R19.5 POSITIVE COLORECTAL CANCER SCREENING USING COLOGUARD TEST: Primary | ICD-10-CM

## 2022-12-28 DIAGNOSIS — R14.0 ABDOMINAL DISTENSION: Chronic | ICD-10-CM

## 2022-12-28 PROCEDURE — 99204 OFFICE O/P NEW MOD 45 MIN: CPT | Performed by: INTERNAL MEDICINE

## 2022-12-28 RX ORDER — UMECLIDINIUM BROMIDE AND VILANTEROL TRIFENATATE 62.5; 25 UG/1; UG/1
1 POWDER RESPIRATORY (INHALATION)
COMMUNITY
Start: 2022-12-07

## 2022-12-28 RX ORDER — SODIUM CHLORIDE, SODIUM LACTATE, POTASSIUM CHLORIDE, CALCIUM CHLORIDE 600; 310; 30; 20 MG/100ML; MG/100ML; MG/100ML; MG/100ML
30 INJECTION, SOLUTION INTRAVENOUS CONTINUOUS
Status: CANCELLED | OUTPATIENT
Start: 2023-01-10

## 2022-12-28 NOTE — TELEPHONE ENCOUNTER
OK FOR HUB TO READ JASVIR patient via telephone for COLONOSCOPY. Scheduled 01/10/2023 [place] [address] AM. Prep paperwork mailed to verified address on file. Patient advised arrival time may change based on [facility] guidelines. JASVIR RUCKER

## 2022-12-28 NOTE — H&P (VIEW-ONLY)
Chief Complaint   Patient presents with   • Abdominal Pain       Subjective     HPI    Yolanda Lee Hatchett is a 62 y.o. female with a past medical history noted below who presents for abdominal distension and positive cologuard testing.  Feels abdomen has been swollen and distended for about 6 months.  Says she has gained weight.  Says she looks pregnant.    Had cologuard testing with her pcp which was positive at the end of October.  She has not had a colonoscopy.  No family history of colon cancer in her family nor other GI malignancy.    Smoking about 6 cigarettes daily.  No excess ETOH.    Hx of appendectomy.    Follows regularly with Dr Burns for history of lung cancer, no recurrence of disease for 12 years.      Works as a .      Today's visit was in the office.  Both the patient and I were wearing face masks and proper hand hygiene was performed before and after the physical exam.           Current Outpatient Medications:   •  albuterol sulfate HFA (Ventolin HFA) 108 (90 Base) MCG/ACT inhaler, Inhale 2 puffs Every 4 (Four) Hours As Needed for Wheezing., Disp: 17 g, Rfl: 3  •  Anoro Ellipta 62.5-25 MCG/ACT aerosol powder  inhaler, , Disp: , Rfl:   •  naproxen (Naprosyn) 250 MG tablet, Take 2 tablets by mouth 2 (Two) Times a Day With Meals., Disp: 40 tablet, Rfl: 1  •  nicotine polacrilex (COMMIT) 4 MG lozenge, Dissolve 1 lozenge in the mouth As Needed for Smoking Cessation., Disp: 108 each, Rfl: 3      Objective     Vitals:    12/28/22 1401   BP: 117/72   Pulse: 80   Temp: 96.2 °F (35.7 °C)         12/28/22  1401   Weight: 67.4 kg (148 lb 11.2 oz)     Body mass index is 27.2 kg/m².    Physical Exam  Constitutional:       General: She is not in acute distress.  Pulmonary:      Effort: Pulmonary effort is normal.   Neurological:      Mental Status: She is alert and oriented to person, place, and time.   Psychiatric:         Mood and Affect: Mood normal.         Behavior: Behavior normal.         Thought  Content: Thought content normal.         Judgment: Judgment normal.             WBC   Date Value Ref Range Status   11/09/2022 6.69 3.40 - 10.80 10*3/mm3 Final   02/05/2021 5.4 3.4 - 10.8 x10E3/uL Final     RBC   Date Value Ref Range Status   11/09/2022 5.26 3.77 - 5.28 10*6/mm3 Final   02/05/2021 5.09 3.77 - 5.28 x10E6/uL Final     Hemoglobin   Date Value Ref Range Status   11/09/2022 14.1 12.0 - 15.9 g/dL Final     Hematocrit   Date Value Ref Range Status   11/09/2022 43.5 34.0 - 46.6 % Final     MCV   Date Value Ref Range Status   11/09/2022 82.7 79.0 - 97.0 fL Final     MCH   Date Value Ref Range Status   11/09/2022 26.8 26.6 - 33.0 pg Final     MCHC   Date Value Ref Range Status   11/09/2022 32.4 31.5 - 35.7 g/dL Final     RDW   Date Value Ref Range Status   11/09/2022 13.6 12.3 - 15.4 % Final     RDW-SD   Date Value Ref Range Status   11/09/2022 40.8 37.0 - 54.0 fl Final     MPV   Date Value Ref Range Status   11/09/2022 8.6 6.0 - 12.0 fL Final     Platelets   Date Value Ref Range Status   11/09/2022 312 140 - 450 10*3/mm3 Final     Neutrophil %   Date Value Ref Range Status   11/09/2022 50.6 42.7 - 76.0 % Final     Lymphocyte %   Date Value Ref Range Status   11/09/2022 37.1 19.6 - 45.3 % Final     Monocyte %   Date Value Ref Range Status   11/09/2022 6.7 5.0 - 12.0 % Final     Eosinophil %   Date Value Ref Range Status   11/09/2022 4.6 0.3 - 6.2 % Final     Basophil %   Date Value Ref Range Status   11/09/2022 0.7 0.0 - 1.5 % Final     Immature Grans %   Date Value Ref Range Status   11/09/2022 0.3 0.0 - 0.5 % Final     Neutrophils, Absolute   Date Value Ref Range Status   11/09/2022 3.38 1.70 - 7.00 10*3/mm3 Final     Lymphocytes, Absolute   Date Value Ref Range Status   11/09/2022 2.48 0.70 - 3.10 10*3/mm3 Final     Monocytes, Absolute   Date Value Ref Range Status   11/09/2022 0.45 0.10 - 0.90 10*3/mm3 Final     Eosinophils, Absolute   Date Value Ref Range Status   11/09/2022 0.31 0.00 - 0.40 10*3/mm3  Final     Basophils, Absolute   Date Value Ref Range Status   11/09/2022 0.05 0.00 - 0.20 10*3/mm3 Final     Immature Grans, Absolute   Date Value Ref Range Status   11/09/2022 0.02 0.00 - 0.05 10*3/mm3 Final     nRBC   Date Value Ref Range Status   11/09/2022 0.0 0.0 - 0.2 /100 WBC Final       Lab Results   Component Value Date    GLUCOSE 90 11/09/2022    BUN 11 11/09/2022    CREATININE 0.95 11/09/2022    EGFRIFNONA 82 02/05/2021    EGFRIFAFRI 69 11/08/2021    BCR 11.6 11/09/2022    CO2 26.2 11/09/2022    CALCIUM 10.2 11/09/2022    PROTENTOTREF 7.8 02/05/2021    ALBUMIN 4.60 11/09/2022    LABIL2 1.2 02/05/2021    AST 21 11/09/2022    ALT 16 11/09/2022     CT a/p  IMPRESSION:  1. No interval change or evidence for metastatic disease to the chest or  pelvis in patient with previous right upper lobectomy. A 9 mm nodule in  superior segment left lower lobe appears dense and is without change.  2. No evidence for metastatic disease to the abdomen or pelvis. There  are 2 splenic cysts measuring 2.3 and 1.4 cm which are new when compared  to the previous CT December 2015 that are without evidence for solid  component and could be followed on subsequent exam.  3. Bilateral adrenal nodules without change.  4. 3 cm right lower pole renal cyst.  5. Right retrocrural fluid density consistent with a prominent cisterna  chyli without change compared with exam 12/01/2015.               Radiation dose reduction techniques were utilized, including automated  exposure control and exposure modulation based on body size.     This report was finalized on 11/3/2022 1:55 PM by Dr. Polo Merino M.D.         I personally reviewed data as detailed below:     The labs listed above.    The radiology studies listed above.    Office notes from: 11/9/22 oncology note      Assessment and Plan  1.  Positive Cologuard test: Needs colonoscopy.  Asymptomatic    2.  Abdominal distention: Possibly due to weight gain versus intestinal gas.   Reassuring recent imaging    Plan  Proceed with colonoscopy as soon as able to follow-up positive Cologuard  Further recommendations after colonoscopy       Diagnoses and all orders for this visit:    1. Positive colorectal cancer screening using Cologuard test (Primary)  -     Case Request; Standing  -     Follow Anesthesia Guidelines / Protocol; Future  -     Obtain Informed Consent; Future  -     Implement Anesthesia Orders Day of Procedure; Standing  -     Obtain Informed Consent; Standing  -     Verify bowel prep was successful; Standing  -     lactated ringers infusion  -     Case Request    2. Abdominal distension  -     Case Request; Standing  -     Follow Anesthesia Guidelines / Protocol; Future  -     Obtain Informed Consent; Future  -     Implement Anesthesia Orders Day of Procedure; Standing  -     Obtain Informed Consent; Standing  -     Verify bowel prep was successful; Standing  -     lactated ringers infusion  -     Case Request          I have discussed the above plan with the patient.  They verbalize understanding and are in agreement with the plan.  They have been advised to contact the office for any questions, concerns, or changes related to their health.    Dictated utilizing Dragon dictation

## 2023-01-09 RX ORDER — ASPIRIN 81 MG/1
81 TABLET ORAL DAILY
COMMUNITY

## 2023-01-10 ENCOUNTER — ANESTHESIA (OUTPATIENT)
Dept: GASTROENTEROLOGY | Facility: HOSPITAL | Age: 63
End: 2023-01-10
Payer: MEDICAID

## 2023-01-10 ENCOUNTER — HOSPITAL ENCOUNTER (OUTPATIENT)
Facility: HOSPITAL | Age: 63
Setting detail: HOSPITAL OUTPATIENT SURGERY
Discharge: HOME | End: 2023-01-10
Attending: INTERNAL MEDICINE | Admitting: INTERNAL MEDICINE
Payer: MEDICAID

## 2023-01-10 ENCOUNTER — ANESTHESIA EVENT (OUTPATIENT)
Dept: GASTROENTEROLOGY | Facility: HOSPITAL | Age: 63
End: 2023-01-10
Payer: MEDICAID

## 2023-01-10 VITALS
HEIGHT: 63 IN | TEMPERATURE: 97.5 F | RESPIRATION RATE: 16 BRPM | WEIGHT: 144 LBS | DIASTOLIC BLOOD PRESSURE: 72 MMHG | SYSTOLIC BLOOD PRESSURE: 125 MMHG | OXYGEN SATURATION: 95 % | BODY MASS INDEX: 25.52 KG/M2 | HEART RATE: 72 BPM

## 2023-01-10 DIAGNOSIS — R14.0 ABDOMINAL DISTENSION: ICD-10-CM

## 2023-01-10 DIAGNOSIS — R19.5 POSITIVE COLORECTAL CANCER SCREENING USING COLOGUARD TEST: ICD-10-CM

## 2023-01-10 PROCEDURE — 25010000002 PROPOFOL 10 MG/ML EMULSION: Performed by: NURSE ANESTHETIST, CERTIFIED REGISTERED

## 2023-01-10 PROCEDURE — 45385 COLONOSCOPY W/LESION REMOVAL: CPT | Performed by: INTERNAL MEDICINE

## 2023-01-10 PROCEDURE — 88305 TISSUE EXAM BY PATHOLOGIST: CPT | Performed by: INTERNAL MEDICINE

## 2023-01-10 RX ORDER — SODIUM CHLORIDE, SODIUM LACTATE, POTASSIUM CHLORIDE, CALCIUM CHLORIDE 600; 310; 30; 20 MG/100ML; MG/100ML; MG/100ML; MG/100ML
30 INJECTION, SOLUTION INTRAVENOUS CONTINUOUS
Status: DISCONTINUED | OUTPATIENT
Start: 2023-01-10 | End: 2023-01-10 | Stop reason: HOSPADM

## 2023-01-10 RX ORDER — PROPOFOL 10 MG/ML
VIAL (ML) INTRAVENOUS CONTINUOUS PRN
Status: DISCONTINUED | OUTPATIENT
Start: 2023-01-10 | End: 2023-01-10 | Stop reason: SURG

## 2023-01-10 RX ORDER — SODIUM CHLORIDE 0.9 % (FLUSH) 0.9 %
10 SYRINGE (ML) INJECTION AS NEEDED
Status: DISCONTINUED | OUTPATIENT
Start: 2023-01-10 | End: 2023-01-10 | Stop reason: HOSPADM

## 2023-01-10 RX ORDER — LIDOCAINE HYDROCHLORIDE 20 MG/ML
INJECTION, SOLUTION INFILTRATION; PERINEURAL AS NEEDED
Status: DISCONTINUED | OUTPATIENT
Start: 2023-01-10 | End: 2023-01-10 | Stop reason: SURG

## 2023-01-10 RX ORDER — SODIUM CHLORIDE, SODIUM LACTATE, POTASSIUM CHLORIDE, CALCIUM CHLORIDE 600; 310; 30; 20 MG/100ML; MG/100ML; MG/100ML; MG/100ML
30 INJECTION, SOLUTION INTRAVENOUS CONTINUOUS PRN
Status: DISCONTINUED | OUTPATIENT
Start: 2023-01-10 | End: 2023-01-10 | Stop reason: HOSPADM

## 2023-01-10 RX ORDER — PROPOFOL 10 MG/ML
VIAL (ML) INTRAVENOUS AS NEEDED
Status: DISCONTINUED | OUTPATIENT
Start: 2023-01-10 | End: 2023-01-10 | Stop reason: SURG

## 2023-01-10 RX ORDER — SODIUM CHLORIDE 0.9 % (FLUSH) 0.9 %
10 SYRINGE (ML) INJECTION EVERY 12 HOURS SCHEDULED
Status: DISCONTINUED | OUTPATIENT
Start: 2023-01-10 | End: 2023-01-10 | Stop reason: HOSPADM

## 2023-01-10 RX ADMIN — LIDOCAINE HYDROCHLORIDE 30 MG: 20 INJECTION, SOLUTION INFILTRATION; PERINEURAL at 09:01

## 2023-01-10 RX ADMIN — PROPOFOL 160 MCG/KG/MIN: 10 INJECTION, EMULSION INTRAVENOUS at 09:02

## 2023-01-10 RX ADMIN — Medication 20 MG: at 09:04

## 2023-01-10 RX ADMIN — SODIUM CHLORIDE, POTASSIUM CHLORIDE, SODIUM LACTATE AND CALCIUM CHLORIDE: 600; 310; 30; 20 INJECTION, SOLUTION INTRAVENOUS at 08:59

## 2023-01-10 RX ADMIN — Medication 60 MG: at 09:01

## 2023-01-10 NOTE — ANESTHESIA PREPROCEDURE EVALUATION
Anesthesia Evaluation     Patient summary reviewed                Airway   Mallampati: II  Neck ROM: full  No difficulty expected  Dental      Pulmonary    (+) lung cancer, asthma,shortness of breath,   Cardiovascular     Rhythm: regular    (+) valvular problems/murmurs MR,       Neuro/Psych  (+) dizziness/light headedness,    GI/Hepatic/Renal/Endo    (+)   thyroid problem     Musculoskeletal     Abdominal    Substance History      OB/GYN          Other   arthritis,    history of cancer active                    Anesthesia Plan    ASA 3     MAC       Anesthetic plan, risks, benefits, and alternatives have been provided, discussed and informed consent has been obtained with: patient.        CODE STATUS:

## 2023-01-10 NOTE — ANESTHESIA POSTPROCEDURE EVALUATION
Patient: Yolanda Lee Hatchett    Procedure Summary     Date: 01/10/23 Room / Location: [facility] ENDOSCOPY 8 / [facility] ENDOSCOPY    Anesthesia Start: 0859 Anesthesia Stop: 0931    Procedure: COLONOSCOPY TO CECUM AND TERMINAL ILEUM WITH COLD SNARE POLYPECTOMIES Diagnosis:       Abdominal distension      Positive colorectal cancer screening using Cologuard test      (Abdominal distension [R14.0])      (Positive colorectal cancer screening using Cologuard test [R19.5])    Surgeons: Maribell Sam MD Provider: Cabrera Jett MD    Anesthesia Type: MAC ASA Status: 3          Anesthesia Type: MAC    Vitals  Vitals Value Taken Time   /72 01/10/23 0956   Temp 36.4 °C (97.5 °F) 01/10/23 0956   Pulse 72 01/10/23 0956   Resp 16 01/10/23 0956   SpO2 95 % 01/10/23 0956           Post Anesthesia Care and Evaluation    Patient location during evaluation: PACU  Patient participation: complete - patient participated  Level of consciousness: awake  Pain score: 1  Pain management: adequate    Airway patency: patent  Anesthetic complications: No anesthetic complications  PONV Status: none  Cardiovascular status: acceptable  Respiratory status: acceptable  Hydration status: acceptable

## 2023-01-11 LAB
LAB AP CASE REPORT: NORMAL
PATH REPORT.FINAL DX SPEC: NORMAL
PATH REPORT.GROSS SPEC: NORMAL

## 2023-01-13 NOTE — PROGRESS NOTES
The ascending colon polyp was a tubular adenoma, the rectal polyps were all benign hyperplastic    Please place in 7-year colonoscopy recall, thanks

## 2023-01-20 ENCOUNTER — TELEPHONE (OUTPATIENT)
Dept: GASTROENTEROLOGY | Facility: CLINIC | Age: 63
End: 2023-01-20
Payer: MEDICAID

## 2023-01-20 NOTE — TELEPHONE ENCOUNTER
----- Message from Maribell Sam MD sent at 1/13/2023  4:44 PM EST -----  The ascending colon polyp was a tubular adenoma, the rectal polyps were all benign hyperplastic    Please place in 7-year colonoscopy recall, thanks

## 2023-02-09 ENCOUNTER — OFFICE VISIT (OUTPATIENT)
Dept: FAMILY MEDICINE CLINIC | Facility: CLINIC | Age: 63
End: 2023-02-09
Payer: MEDICAID

## 2023-02-09 VITALS
BODY MASS INDEX: 26.75 KG/M2 | HEIGHT: 63 IN | DIASTOLIC BLOOD PRESSURE: 90 MMHG | SYSTOLIC BLOOD PRESSURE: 132 MMHG | OXYGEN SATURATION: 97 % | HEART RATE: 96 BPM | WEIGHT: 151 LBS

## 2023-02-09 DIAGNOSIS — R29.898 WEAKNESS OF BOTH LOWER EXTREMITIES: Primary | ICD-10-CM

## 2023-02-09 DIAGNOSIS — R49.9 CHANGE OF VOICE: ICD-10-CM

## 2023-02-09 DIAGNOSIS — E04.9 THYROID ENLARGEMENT: ICD-10-CM

## 2023-02-09 DIAGNOSIS — R20.0 BILATERAL LEG NUMBNESS: ICD-10-CM

## 2023-02-09 PROCEDURE — 99214 OFFICE O/P EST MOD 30 MIN: CPT | Performed by: INTERNAL MEDICINE

## 2023-02-09 NOTE — PROGRESS NOTES
Answers for HPI/ROS submitted by the patient on 2/8/2023  Please describe your symptoms.: Both my hips snd legs hurts to walk numbing in my feet snd tingling plus back pain  Have you had these symptoms before?: Yes  How long have you been having these symptoms?: Greater than 2 weeks  Please list any medications you are currently taking for this condition.: Over the Ibuprofen 200mg  What is the primary reason for your visit?: Other    Subjective Chief complaint is leg pain numbness and weakness  Yolanda Lee Hatchett is a 62 y.o. female.     History of Present Illness Smita is here today for some complete plaints about pain in her legs.  This is in both legs.  She reports that walking even a short distance makes them feel fatigued.  She experiences bilateral numbness and tingling along with some back pain.  This has been going on for several months.  She does have a history of lung cancer and colon cancer.  She did have some recent scans.  I did look at her back on the scans.  It does look like she has lost considerable disc height in the lower lumbar area.  She has some osteophytes in the area as well.  Additionally she is complaining of some voice change and some swelling in her neck.    The following portions of the patient's history were reviewed and updated as appropriate: allergies, current medications, past family history, past medical history, past social history, past surgical history and problem list.    Review of Systems   Constitutional: Negative for chills and fever.   HENT: Positive for voice change.    Musculoskeletal: Positive for back pain.   Neurological: Positive for weakness and numbness.       Objective   Physical Exam  Vitals and nursing note reviewed.   Neck:      Thyroid: Thyromegaly present.      Vascular: No carotid bruit.      Comments: There appear to be some nodules on the thyroid.  Cardiovascular:      Rate and Rhythm: Normal rate.   Pulmonary:      Effort: Pulmonary effort is normal.       Breath sounds: No stridor.   Musculoskeletal:      Right lower leg: No edema.      Left lower leg: No edema.      Comments: She has some fluid in the right patellar bursa.  Her knee is actually moving fairly well and there is not much crepitation.  She does have some mild decreased internal rotation of the hips.  She has point tenderness in the lower lumbar spine.  Deep tendon reflexes are slightly hyperreflexic.           Assessment & Plan   Diagnoses and all orders for this visit:    1. Weakness of both lower extremities (Primary)  -     MRI Lumbar Spine With & Without Contrast; Future    2. Bilateral leg numbness  -     MRI Lumbar Spine With & Without Contrast; Future    3. Thyroid enlargement  -     US Thyroid; Future  -     TSH+Free T4  -     Thyroid Antibodies  -     T3, Free    4. Change of voice    Smita is here today for some weakness and numbness in her legs.  It sounds like she may be getting some spinal claudication.  Her pulses are normal to her feet.  Therefore I do not think this would be a vascular issue.  She actually has fairly good range of motion in her hips and knees so I do think the problem is coming from her back.  Because of her history of cancer I am going to order an MRI scan with and without contrast.  She does have some thyroid enlargement and may be a multinodular goiter.  I am going to check some thyroid labs as well as a thyroid ultrasound.

## 2023-02-10 LAB
T3FREE SERPL-MCNC: 2.6 PG/ML (ref 2–4.4)
T4 FREE SERPL-MCNC: 0.97 NG/DL (ref 0.93–1.7)
THYROGLOB AB SERPL-ACNC: <1 IU/ML (ref 0–0.9)
THYROPEROXIDASE AB SERPL-ACNC: <9 IU/ML (ref 0–34)
TSH SERPL DL<=0.005 MIU/L-ACNC: 0.54 UIU/ML (ref 0.27–4.2)

## 2023-02-13 ENCOUNTER — TELEPHONE (OUTPATIENT)
Dept: FAMILY MEDICINE CLINIC | Facility: CLINIC | Age: 63
End: 2023-02-13
Payer: MEDICAID

## 2023-02-23 ENCOUNTER — OFFICE VISIT (OUTPATIENT)
Dept: ONCOLOGY | Facility: CLINIC | Age: 63
End: 2023-02-23
Payer: MEDICAID

## 2023-02-23 ENCOUNTER — LAB (OUTPATIENT)
Dept: LAB | Facility: HOSPITAL | Age: 63
End: 2023-02-23
Payer: MEDICAID

## 2023-02-23 VITALS
OXYGEN SATURATION: 95 % | DIASTOLIC BLOOD PRESSURE: 88 MMHG | BODY MASS INDEX: 26.97 KG/M2 | HEIGHT: 63 IN | RESPIRATION RATE: 16 BRPM | SYSTOLIC BLOOD PRESSURE: 136 MMHG | TEMPERATURE: 96.6 F | HEART RATE: 84 BPM | WEIGHT: 152.2 LBS

## 2023-02-23 DIAGNOSIS — C34.11 MALIGNANT NEOPLASM OF UPPER LOBE OF RIGHT LUNG: ICD-10-CM

## 2023-02-23 DIAGNOSIS — F17.200 TOBACCO USE DISORDER, MODERATE, DEPENDENCE: ICD-10-CM

## 2023-02-23 DIAGNOSIS — C34.90 ADENOCARCINOMA OF LUNG, UNSPECIFIED LATERALITY: ICD-10-CM

## 2023-02-23 DIAGNOSIS — C34.90 ADENOCARCINOMA OF LUNG, UNSPECIFIED LATERALITY: Primary | ICD-10-CM

## 2023-02-23 DIAGNOSIS — J44.9 CHRONIC OBSTRUCTIVE PULMONARY DISEASE, UNSPECIFIED COPD TYPE: ICD-10-CM

## 2023-02-23 LAB
BASOPHILS # BLD AUTO: 0.06 10*3/MM3 (ref 0–0.2)
BASOPHILS NFR BLD AUTO: 0.7 % (ref 0–1.5)
DEPRECATED RDW RBC AUTO: 40.6 FL (ref 37–54)
EOSINOPHIL # BLD AUTO: 0.39 10*3/MM3 (ref 0–0.4)
EOSINOPHIL NFR BLD AUTO: 4.7 % (ref 0.3–6.2)
ERYTHROCYTE [DISTWIDTH] IN BLOOD BY AUTOMATED COUNT: 14 % (ref 12.3–15.4)
HCT VFR BLD AUTO: 38.5 % (ref 34–46.6)
HGB BLD-MCNC: 13.2 G/DL (ref 12–15.9)
IMM GRANULOCYTES # BLD AUTO: 0.02 10*3/MM3 (ref 0–0.05)
IMM GRANULOCYTES NFR BLD AUTO: 0.2 % (ref 0–0.5)
LYMPHOCYTES # BLD AUTO: 3.7 10*3/MM3 (ref 0.7–3.1)
LYMPHOCYTES NFR BLD AUTO: 44.2 % (ref 19.6–45.3)
MCH RBC QN AUTO: 27.6 PG (ref 26.6–33)
MCHC RBC AUTO-ENTMCNC: 34.3 G/DL (ref 31.5–35.7)
MCV RBC AUTO: 80.4 FL (ref 79–97)
MONOCYTES # BLD AUTO: 0.61 10*3/MM3 (ref 0.1–0.9)
MONOCYTES NFR BLD AUTO: 7.3 % (ref 5–12)
NEUTROPHILS NFR BLD AUTO: 3.59 10*3/MM3 (ref 1.7–7)
NEUTROPHILS NFR BLD AUTO: 42.9 % (ref 42.7–76)
NRBC BLD AUTO-RTO: 0 /100 WBC (ref 0–0.2)
PLATELET # BLD AUTO: 288 10*3/MM3 (ref 140–450)
PMV BLD AUTO: 9 FL (ref 6–12)
RBC # BLD AUTO: 4.79 10*6/MM3 (ref 3.77–5.28)
WBC NRBC COR # BLD: 8.37 10*3/MM3 (ref 3.4–10.8)

## 2023-02-23 PROCEDURE — 99214 OFFICE O/P EST MOD 30 MIN: CPT | Performed by: INTERNAL MEDICINE

## 2023-02-23 PROCEDURE — 85025 COMPLETE CBC W/AUTO DIFF WBC: CPT

## 2023-02-23 PROCEDURE — 36415 COLL VENOUS BLD VENIPUNCTURE: CPT

## 2023-02-23 NOTE — PROGRESS NOTES
Subjective .  Patient with increasing back pain, thyromegaly, studies planned.    REASONS FOR FOLLOWUP:    1. Nonsmall cell lung carcinoma, stage IIA (down staged from presumed stage IIIA). The patient received neoadjuvant chemotherapy with carboplatin/Alimta x2. Followup scans showed considerable response. The patient is status post right upper lobectomy and lymphadenectomy (discharged 03/07/2012). Per Thoracic Conference, additional two cycles of adjuvant carboplatin/Alimta was advised and initiated on 04/11/2012. She has since remained in remission.     2.  Chest x-ray February 2004, July 20 2020-left hilar nodule 10 mm reduced to 6 mm    3.  Low-dose CT scan without evidence of recurrent disease 5/17/2021    4.  Patient seen 5/9/2022 with bilateral hip pain, abdominal swelling    5.  Patient assessed by CT chest and pelvis at [facility] 5/24/2022, pulmonary nodule, bilateral adrenal nodules, follow-up scans in 3 months planned, smoking cessation planned    6.  Patient reassessed CT chest abdomen pelvis 11/1/2022-no interval change with metastatic disease in chest abdomen pelvis.  Patient proceeding to smoking cessation.    7.  Patient seen 2/23/2023, primary care assessment with ultrasound of thyroid, MRI lumbar spine.  Patient status post endoscopy with polyps removed.            History of Present Illness    The patient is a 62 y.o. female with the above-mentioned history, who returns the office today for 3-month follow-up.  She remains in observation in regards to her malignancy.  She did undergo surveillance CT scan July 2019 with a left lower lobe nodule, 1.1 cm noted.  She then underwent PET scan 7/24/2019 with no metabolic activity identified.   The patient reports today she overall feels very well.  She does have some chronic shortness of breath on exertion though recovers quickly with rest.  She has been exercising and changed her diet, therefore has lost some weight.  She continues to have  a chronic cough with occasional productive clear sputum.  She is attempting to stop smoking, though continues to smoke 1 pack/day.  She was recently prescribed Chantix by pulmonology.  She denies any new pain.  The patient is next seen January 2, 2020.  Unfortunately she is no longer covered by insurance though she remains, understandably, concerned about a previously noted left lower lobe pulmonary nodule.  A follow-up scan was scheduled though she is not able to cover that currently financially.      Plans were made for reassessment and the patient is next seen February 12, 2020 with repeat chest x-ray showing again a 1 cm lung nodule projecting just lateral to the left hilum between the level of the posterior left eighth and ninth ribs.  Fortunately she has not further symptomatic at this point additionally.  A follow-up chest x-ray was requested and obtained July 20, 2020 fortunately demonstrating a reduction in the left hilar nodule from 10 mm now to 6 mm.  She continues to have ongoing back pain indicating that insurance coverage has lapsed.        We elected to obtain a follow-up chest x-ray the patient is seen March 1, 2021.  Fortunately she is feeling well and repeat chest x-ray February 22, 2021 shows pulmonary hyperexpansion.  The previously noted lung nodules not noted left lower lobe or other suspicious lesion.  We have discussed follow-up low-dose CT scanning.    The patient underwent low-dose CT scan 5/17/2021 showing stable fairly dense pulmonary nodule left lower lobe that was thought to be granulomatous.  There were no other findings that might be suspicious for malignancy.  Patient, fortunately is feeling fair except for chronic back pain.  This is being assessed by her primary care physician with additional films.    The patient is reassessed 11/7/2021.  Mammographic screening 10/11/2021 with a negative, chest x-ray was performed 11/6/2021 and is negative for new abnormalities.  Patient states  that Dr. Wiley  MRI of the lumbar spine is reviewed result the patient's current low back pain    The patient is next seen, ever, 5/9/2022 indicating that she has been having increasing hip pain as well as abdominal swelling.  Her primary care had endeavor to have her undergo repeat scans that are not covered at [facility] and thus we will try to schedule as an outpatient at a [facility] facility.    Patient had her scans performed at [facility] CT abdomen pelvis 5/24/2022 demonstrating indeterminate 8 x 9 mm left lower lobe pulmonary nodule, indeterminate bilateral adrenal gland nodules measuring up to 1.4 cm.  There is no comparison studies done on these examinations.    The patient is seen 6/13/2022 discussed that she had previous scans 2015 showing a left adrenal adenoma but no abnormalities in the right adrenal gland.  Follow-up studies scheduled 3 months intervals [place].  Smoking cessation plan and pulmonary referral planned.    Patient seen by pulmonary medicine 6/17/2022-COPD with mild exacerbation noted, Chantix trial.    The patient is next assessed 11/9/2022 with CT chest abdomen pelvis reviewed showing no clear evidence of metastatic disease to the chest or pelvis, status post right upper lobectomy, 9 mm nodule superior segment left lower lobe without change, new onset metastatic disease in abdomen pelvis with 2 splenic cysts that are new, bilateral adrenal nodules without change.  The patient is seen formally 11/9/2022 fortunately feeling reasonably well though still having extremity pain that may be peripheral vascular disease and she is urged strongly to discontinue smoking altogether at this point.  She is proceeding through nicotine lozenges.  Fortunately her scans, as above, do not demonstrate progressive malignancy.  She does describe a positive Cologuard exam however and has been urged to proceed to colonoscopy which is now being scheduled.    The patient is next assessed 2/23/2023  having issues with back pain leading to primary care to assess an MRI of the lumbar spine now pending as well as ultrasound of thyroid after further thyromegaly noted, normal TFTs.  Notably she proceeded through colonoscopy with 2 polyps removed 1/10/2023-tubular adenoma in ascending colon, hyperplastic polyp in the rectum.      Past Medical History:   Diagnosis Date   • Asthma    • Bleeding of blood vessel 01/05/2011    BRAIN   • Carpal tunnel syndrome on right    • COPD (chronic obstructive pulmonary disease) (HCC)     MODERATE   • DDD (degenerative disc disease), cervical 09/22/2014    MODERATE C6-C7, SEEING DR. EWING   • Drug therapy     lung ca   • Emphysema of lung (HCC)    • Fibroids     UTERINE X 2   • Hx of radiation therapy     lung ca   • Lung cancer (HCC)    • MI (myocardial infarction) (HCC) 06/2009   • Mitral insufficiency    • Non-small cell carcinoma of lung (HCC)     Stabe IIA   • Ovarian cyst     RIGHT   • Pain of right upper extremity 11/18/2015   • Pelvic pain    • Positive colorectal cancer screening using Cologuard test    • Right shoulder pain    • SOB (shortness of breath)    • Thyroid nodule    • Vertigo 05/13/2015   • Weight loss        ONCOLOGIC HISTORY:  (History from previous dates can be found in the separate document.)    Current Outpatient Medications on File Prior to Visit   Medication Sig Dispense Refill   • albuterol sulfate HFA (Ventolin HFA) 108 (90 Base) MCG/ACT inhaler Inhale 2 puffs Every 4 (Four) Hours As Needed for Wheezing. 17 g 3   • Anoro Ellipta 62.5-25 MCG/ACT aerosol powder  inhaler 1 puff Daily.     • aspirin 81 MG EC tablet Take 81 mg by mouth Daily.     • naproxen (Naprosyn) 250 MG tablet Take 2 tablets by mouth 2 (Two) Times a Day With Meals. 40 tablet 1   • nicotine polacrilex (COMMIT) 4 MG lozenge Dissolve 1 lozenge in the mouth As Needed for Smoking Cessation. 108 each 3     No current facility-administered medications on file prior to visit.       ALLERGIES:   "   Allergies   Allergen Reactions   • Codeine Irritability       Social History     Socioeconomic History   • Marital status:    • Years of education: College   Tobacco Use   • Smoking status: Every Day     Packs/day: 0.50     Years: 50.00     Pack years: 25.00     Types: Cigarettes     Start date: 1972   • Smokeless tobacco: Current   • Tobacco comments:     11/28/22 - reports 3 cigs/day + 3 lozenges/day   Vaping Use   • Vaping Use: Never used   Substance and Sexual Activity   • Alcohol use: Yes     Comment: OCCASIONAL   • Drug use: Yes     Types: Marijuana   • Sexual activity: Not Currently     Partners: Male     Birth control/protection: Post-menopausal         Cancer-related family history includes Brain cancer in her father; Cancer (age of onset: 47) in her mother. There is no history of Breast cancer.      Review of Systems   Constitutional: Negative for chills, fatigue and fever.   HENT: Negative for mouth sores and sore throat.    Eyes: Negative for visual disturbance.   Respiratory: Negative for cough, chest tightness, shortness of breath (chronic unchanged) and wheezing.    Cardiovascular: Negative for chest pain and leg swelling.   Gastrointestinal: Negative for abdominal pain, constipation and vomiting.   Genitourinary: Negative for dysuria and frequency.   Musculoskeletal: Positive for back pain, joint swelling, myalgias and neck pain.   Neurological: Positive for numbness. Negative for dizziness and weakness.   Hematological: Does not bruise/bleed easily.   Psychiatric/Behavioral: The patient is not nervous/anxious.    All other systems reviewed and are negative.  Review of systems 09/18/2019  unchanged from previous office visit except as updated.       Objective      Vitals:    02/23/23 1624   BP: 136/88   Pulse: 84   Resp: 16   Temp: 96.6 °F (35.9 °C)   TempSrc: Temporal   SpO2: 95%   Weight: 69 kg (152 lb 3.2 oz)   Height: 158.8 cm (62.52\")   PainSc:   6   PainLoc: Generalized     Current " Status 2/23/2023   ECOG score 0       Physical Exam    GENERAL:  female alert and oriented.   SKIN: Warm, dry without rashes, purpura or petechiae.   HEAD: Normocephalic.   EYES: Pupils equal, round and reactive to light. EOMs intact. Conjunctivae normal.   EARS: Hearing intact.   NOSE: Septum midline. No excoriations or nasal discharge.   MOUTH: Tongue is well-papillated; no stomatitis or ulcers. Lips normal.   THROAT: Oropharynx without lesions or exudates.   NECK: Supple with good range of motion; progressive thyromegaly noted, no JVD or bruits.   LYMPHATICS: No cervical, supraclavicular adenopathy.   CHEST: Lungs clear to auscultation, prolonged expiratory phase noted bilaterally.   CARDIAC: Regular rate and rhythm without murmurs, rubs or gallops. Bowel sounds present.  ABDOMEN: Soft, nontender with no organomegaly or masses.   EXTREMITIES: No clubbing, cyanosis or edema.   NEUROLOGICAL: No focal neurological deficits. .      RECENT LABS:  Results from last 7 days   Lab Units 02/23/23  1620   WBC 10*3/mm3 8.37   NEUTROS ABS 10*3/mm3 3.59   HEMOGLOBIN g/dL 13.2   HEMATOCRIT % 38.5   PLATELETS 10*3/mm3 288      SCANNED - IMAGING (05/24/2022)      Assessment plan;        1. Nonsmall cell lung carcinoma, stage IIA (down staged from presumed stage IIIA).  Staging MRI of the brain negative. The patient received neoadjuvant chemotherapy with carboplatin/Alimta x2. Followup scans showed considerable response. The patient is status post right upper lobectomy and lymphadenectomy (discharged 03/07/2012). Per Thoracic Conference, additional two cycles of adjuvant carboplatin/Alimta was advised and initiated on 04/11/2012. She has since remained in remission.    Low-dose screening CT of the chest July 2018 with a 1.1 left lower lobe nodule noted.  Follow-up PET scan the nodule was photopenic.  The patient has a follow-up review now in February 2020 after we have her apply for assistance with [employer].  A chest x-ray be requested in 5 weeks to see the physician in 6 weeks.  This proceeded with patient assessed February 12, 2020 with chest x-ray February 4  not significantly changed from previous.  At this point will reassess again at 6 months with MD, chest x-ray CBC and CMP.     This is reassessed September 30, 2020 with a left hilar nodule having dropped from 10 mm to 6 mm.  We will continue reassessment every 6 months.  The patient's follow-up chest x-ray February 22, 2021 is negative for abnormalities though low-dose CT scan is felt reasonable and be scheduled within the next 3 months.  She will be seen formally after the scan is done.  She agrees with this plan and follow-up.  The patient's follow-up low-dose CT chest done in follow-up 5/17/2021 was negative for recurrent disease.  There remains stable fairly dense pulmonary nodule left lower lobe thought to be a granuloma.  The patient had a follow-up chest x-ray 11/6/2021 without new abnormalities.  We discussed a repeat low-dose CT scan but she has an MRI possibly scheduled next several months of the lumbar spine.    Paced rhythm at 5/9/2022 with progressive symptoms of abdominal discomfort and swelling, her physical exam is not particularly abnormal but she is also having a little hip pain and has chronic issues with shortness of breath and cough as well as smoking.    Patient underwent [facility] at [facility] with [facility] chest abdomen pelvis 5/24/2022 demonstrating indeterminate 8 x 9 mm left lower lobe pulmonary nodule, indeterminate bilateral adrenal gland nodules measuring up to 1.4 cm.  There is no comparison studies done on these examinations.    The patient is seen 6/13/2022 discussed that she had previous scans 2015 showing a left adrenal adenoma but no abnormalities in the right adrenal gland.  Follow-up studies scheduled 3 months intervals [place].  Smoking cessation plan and pulmonary referral planned.  Patient assessed by  follow-up CT chest abdomen pelvis 10/12/2022 without evidence of recurrent disease.      2.  COPD.  Currently without symptoms of exacerbation.  She does see pulmonology and a follow-up will be scheduled.      3.  Smoking cessation.  Patient currently continuing with nicotine lozenges                                                                           4. Chronic pain. The patient is seen and managed by pain management.  Recent increase in back pain noted by primary care with lumbar spine MRI pending    5.  Recent positive findings for Cologuard, colonoscopy as above with polyps removed including ascending colon tubular adenoma and hyperplastic polyp in the rectum.        Plan:    *Again patient now scheduled for ultrasound of the thyroid and lumbar spine MRI    *Follow-up with primary care    *6 months follow-up MD, CBC, CMP

## 2023-03-24 ENCOUNTER — HOSPITAL ENCOUNTER (OUTPATIENT)
Dept: MRI IMAGING | Facility: HOSPITAL | Age: 63
Discharge: HOME | End: 2023-03-24
Payer: MEDICAID

## 2023-03-24 ENCOUNTER — HOSPITAL ENCOUNTER (OUTPATIENT)
Dept: ULTRASOUND IMAGING | Facility: HOSPITAL | Age: 63
Discharge: HOME | End: 2023-03-24
Payer: MEDICAID

## 2023-03-24 DIAGNOSIS — E04.9 THYROID ENLARGEMENT: ICD-10-CM

## 2023-03-24 DIAGNOSIS — R20.0 BILATERAL LEG NUMBNESS: ICD-10-CM

## 2023-03-24 DIAGNOSIS — R29.898 WEAKNESS OF BOTH LOWER EXTREMITIES: ICD-10-CM

## 2023-03-24 LAB — CREAT BLDA-MCNC: 0.9 MG/DL (ref 0.6–1.3)

## 2023-03-24 PROCEDURE — 72158 MRI LUMBAR SPINE W/O & W/DYE: CPT

## 2023-03-24 PROCEDURE — 76536 US EXAM OF HEAD AND NECK: CPT

## 2023-03-24 PROCEDURE — 0 GADOBENATE DIMEGLUMINE 529 MG/ML SOLUTION: Performed by: INTERNAL MEDICINE

## 2023-03-24 PROCEDURE — 82565 ASSAY OF CREATININE: CPT

## 2023-03-24 PROCEDURE — A9577 INJ MULTIHANCE: HCPCS | Performed by: INTERNAL MEDICINE

## 2023-03-24 RX ADMIN — GADOBENATE DIMEGLUMINE 14 ML: 529 INJECTION, SOLUTION INTRAVENOUS at 08:40

## 2023-03-27 DIAGNOSIS — M54.42 CHRONIC BILATERAL LOW BACK PAIN WITH BILATERAL SCIATICA: Primary | ICD-10-CM

## 2023-03-27 DIAGNOSIS — R49.0 PERSISTENT HOARSENESS: ICD-10-CM

## 2023-03-27 DIAGNOSIS — G89.29 CHRONIC BILATERAL LOW BACK PAIN WITH BILATERAL SCIATICA: Primary | ICD-10-CM

## 2023-03-27 DIAGNOSIS — M54.41 CHRONIC BILATERAL LOW BACK PAIN WITH BILATERAL SCIATICA: Primary | ICD-10-CM

## 2023-03-28 NOTE — PROGRESS NOTES
I discussed the patient's ultrasound results and her MRI of the back.  Referral is being made to ear nose and throat for persistent hoarseness and pain management for chronic back pain

## 2023-03-28 NOTE — PROGRESS NOTES
Test results shared with patient.  Ultrasound of the thyroid shows cysts-referral being made to ear nose and throat for persistent hoarseness.  Referral to pain management for chronic low back pain

## 2023-04-21 ENCOUNTER — OFFICE VISIT (OUTPATIENT)
Dept: PAIN MEDICINE | Facility: CLINIC | Age: 63
End: 2023-04-21
Payer: MEDICAID

## 2023-04-21 ENCOUNTER — PREP FOR SURGERY (OUTPATIENT)
Dept: SURGERY | Facility: SURGERY CENTER | Age: 63
End: 2023-04-21
Payer: MEDICAID

## 2023-04-21 VITALS
WEIGHT: 144.2 LBS | SYSTOLIC BLOOD PRESSURE: 127 MMHG | TEMPERATURE: 97.5 F | RESPIRATION RATE: 18 BRPM | OXYGEN SATURATION: 97 % | HEIGHT: 63 IN | BODY MASS INDEX: 25.55 KG/M2 | DIASTOLIC BLOOD PRESSURE: 82 MMHG | HEART RATE: 78 BPM

## 2023-04-21 DIAGNOSIS — G89.4 CHRONIC PAIN SYNDROME: ICD-10-CM

## 2023-04-21 DIAGNOSIS — M48.062 SPINAL STENOSIS OF LUMBAR REGION WITH NEUROGENIC CLAUDICATION: Primary | ICD-10-CM

## 2023-04-21 DIAGNOSIS — M54.16 LUMBAR RADICULOPATHY: ICD-10-CM

## 2023-04-21 DIAGNOSIS — M53.3 SACROILIAC JOINT PAIN: ICD-10-CM

## 2023-04-21 DIAGNOSIS — M70.62 TROCHANTERIC BURSITIS OF LEFT HIP: ICD-10-CM

## 2023-04-21 RX ORDER — SODIUM CHLORIDE 0.9 % (FLUSH) 0.9 %
10 SYRINGE (ML) INJECTION AS NEEDED
OUTPATIENT
Start: 2023-04-21

## 2023-04-21 RX ORDER — SODIUM CHLORIDE 0.9 % (FLUSH) 0.9 %
10 SYRINGE (ML) INJECTION EVERY 12 HOURS SCHEDULED
OUTPATIENT
Start: 2023-04-21

## 2023-04-21 RX ORDER — MELOXICAM 15 MG/1
15 TABLET ORAL DAILY PRN
Qty: 30 TABLET | Refills: 1 | Status: SHIPPED | OUTPATIENT
Start: 2023-04-21

## 2023-04-21 NOTE — H&P (VIEW-ONLY)
The patient has a pain history of the following:  Chronic low back pain    Previous interventions that the patient has received include:   None     Pain medications include:  Naproxen  Tylenol   THC helps some     Previously: Gabapentin (dizziness), muscle relaxant     Other conservative modalities which the patient reports using include:  Physical Therapy: yes - no benefit   Chiropractor: no  Massage Therapy: no  TENS: no  Neck or back surgery: no  Past pain management: yes, [facility]    Past Significant Surgical History:  None     HPI:       CHIEF COMPLAINT: Back Pain    Yolanda Lee Hatchett is a 63 y.o. female referred here by Jovany Wiley MD. Yolanda Lee Hatchett presents to the office for evaluation and treatment of Back Pain      History of Present Illness  Onset:  20+ years   Inciting Event:  Worked in a hospital  Location:  Low back  Pain: Pain described as numbness and ache. Located in the low back and does radiate into bilateral lower extremities, the left being worse than the right.  Severity:  Pain rated as a 8 /10.  Apportions pain as 50%  back pain and 50% extremity pain.  Symptoms have been constant.  Exacerbation:  Walking, standing sitting - any prolonged activity.   Alleviation:  Shifting positions.  Associated Symptoms:   She denies any new onset of bowel or bladder weakness, significant leg weakness or saddle anesthesia. Denies balance problems or lower extremity incoordination.  Ambulates: Without assistive device   Limitations: This pain limits the patient from playing with her grandchildren.   Goals: Functional goals include ability to do the above.      [place] 44    When she was previously in pain management, she was taking hydrocodone-acetaminophen 7.5-325mg.  She stopped going due to insurance issues and has been self medicating with marijuana daily.  She never received injections because her insurance never approved them as an option.  She states that she's unsure if she would be  able to stop using marijuana.  She took gabapentin in the past and struggled with vertigo.  Currently she takes Tylenol and Aleve and has some benefit, however it doesn't last very long.        PEG Assessment   What number best describes your pain on average in the past week?7  What number best describes how, during the past week, pain has interfered with your enjoyment of life?6  What number best describes how, during the past week, pain has interfered with your general activity?  8        Current Outpatient Medications:   •  albuterol sulfate HFA (Ventolin HFA) 108 (90 Base) MCG/ACT inhaler, Inhale 2 puffs Every 4 (Four) Hours As Needed for Wheezing., Disp: 17 g, Rfl: 3  •  Anoro Ellipta 62.5-25 MCG/ACT aerosol powder  inhaler, 1 puff Daily., Disp: , Rfl:   •  aspirin 81 MG EC tablet, Take 1 tablet by mouth Daily., Disp: , Rfl:   •  meloxicam (MOBIC) 15 MG tablet, Take 1 tablet by mouth Daily As Needed for Moderate Pain. Take with food., Disp: 30 tablet, Rfl: 1    The following portions of the patient's history were reviewed and updated as appropriate: allergies, current medications, past family history, past medical history, past social history, past surgical history and problem list.      REVIEW OF PERTINENT MEDICAL DATA    3/24/23 MRI LUMBAR SPINE WITH AND WITHOUT CONTRAST ON 03/24/2023     CLINICAL HISTORY: Patient has history of lung cancer and has back pain,  bilateral lower extremity numbness and weakness.     TECHNIQUE: Sagittal T1, proton density and fat-suppressed T2 and  postcontrast sagittal fat-suppressed T1-weighted images were obtained  lumbar spine. In addition thin cut axial T1-weighted images were  obtained angled through the interspaces from T11 to S1 axial T2 and  postcontrast axial T1-weighted images were obtained from T11 to S1.     This is correlated to prior post myelogram CT lumbar spine from  on 02/12/2016.     FINDINGS: The distal thoracic cord and conus is  normal in signal  intensity. The conus terminates at the level of the inferior body of L2  which is borderline but probably lower limits of normal.     At T12-L1 the disc space and facets are normal with no canal or  foraminal narrowing.     At L1-2 the disc space and facets are normal with no canal or foraminal  narrowing.     At L2 there is mild bilateral facet overgrowth, mild disc space  narrowing and degenerative endplate change, 2 mm retrolisthesis of L2 on  L3 and diffuse annular disc bulge and there is mild-to-moderate  narrowing of the thecal sac and there is mild bilateral foraminal  narrowing.     At L3-4 there is mild-to-moderate bilateral facet overgrowth. There is  disc space narrowing and degenerative endplate changes, 3 mm  retrolisthesis of L3 on L4, diffuse annular spurring and bulging disc  material. Prominent posterior epidural fat and a combination of all of  the above findings contributes to severe generalized narrowing of the  thecal sac diminished spinal fluid bathing the cauda equina at this  level and there is spurring bulging disc material extending into the  neural foramina with mild-to-moderate bilateral foraminal narrowing.     At L4-5 there is mild-to-moderate bilateral facet overgrowth. There is  severe disc space narrowing and there are degenerative endplate changes  diffuse posterior disc osteophyte complex effaces the ventral aspect of  thecal sac and there is prominent posterior epidural fat and there is  severe narrowing of the thecal sac and diminished spinal fluid bathing  the cauda equina at this level. There is some slight narrowing of the  lateral recesses posterior spurs abut the ventral aspect traversing L5  nerve roots. There is spurring into the inferior aspect of the neural  foramina bilaterally with mild-to-moderate right and there is moderate  left bony foraminal narrowing.     At L5-S1 there is mild bilateral facet overgrowth and there is severe  disc space narrowing  and there are degenerative endplate changes, 5 mm  retrolisthesis of L5 with respect to S1 with uncovering posterior  inferior aspect of L5-S1 disc space and disc material bulging along the  posterior superior endplate of S1 eccentric right paracentrally where it  protrudes where there is some herniated disc material along the right  posterior superior endplate of S1. There is some narrowing of the thecal  sac. There is slight narrowing lateral recesses. There is spurring into  the foramina bilaterally and there is moderate bilateral bony foraminal  narrowing.     IMPRESSION:  1. The disc space and facets are normal with no canal or foraminal  narrowing at T12-L1 and L1-L2.  2. At L2-3 there is mild bilateral facet overgrowth, disc space  narrowing and degenerative endplate changes and a 3 mm retrolisthesis of  L2 with respect to L3 and diffuse annular disc bulge and posterior  epidural fat contribute to mild-to-moderate narrowing of the thecal sac  and there is mild bilateral foraminal narrowing.  3. At L3-4 there is mild-to-moderate bilateral facet overgrowth, disc  space narrowing and degenerative endplate change, 3 mm retrolisthesis of  L3 on L4 and there is a diffuse posterior disc osteophyte complex and  prominent posterior epidural fat, the combination of which contributes  to severe narrowing of the thecal sac with diminished spinal fluid  bathing the cauda equina at this level. There is spurring bulging disc  material extending into the neural foramina resulting in  mild-to-moderate bilateral foraminal narrowing.  4. At L4-5 there is mild-to-moderate bilateral facet overgrowth and  there is severe disc space narrowing and degenerative endplate changes  and 2-3 mm retrolisthesis of L4 on L5 and prominent diffuse posterior  disc osteophyte complex and prominent posterior epidural fat and there  is severe narrowing of the thecal sac, diminished spinal fluid bathing  the cauda equina at this level, spurring  "into the foramina and there is  mild-to-moderate right and moderate left foraminal narrowing.  5. At L5-S1 there is mild bilateral facet overgrowth, disc space  narrowing and degenerative endplate changes 5 mm retrolisthesis of L5  with respect to S1 with uncovering of the posterior inferior aspect of  L5-S1 disc space and disc material diffusely bulging along the posterior  superior endplate of S1. There is eccentric herniated disc material  along the right paracentral superior body and endplate of S1 measuring 8  x 5 mm abuts the anteromedial aspect traversing right S1 nerve root but  does not have mass effect on it or displace the right S1 nerve root.  There is spurring into the foramina and there is moderate bilateral  foraminal narrowing.     This report was finalized on 3/24/2023 1:53 PM by Dr. Cody Mckeon M.D.         3/24/23 Creatinine 0.90    2/23/23 Platelets 288 (10*3)    Review of Systems   Gastrointestinal: Negative for constipation and diarrhea.   Genitourinary: Negative for difficulty urinating.   Musculoskeletal: Positive for back pain.   Neurological: Positive for numbness (left leg). Negative for weakness.   Psychiatric/Behavioral: Negative for sleep disturbance and suicidal ideas. The patient is not nervous/anxious.      I have reviewed and confirmed the accuracy of the ROS as documented by the MA/LPN/RN Jenny Bauman MD      Vitals:    04/21/23 0844   BP: 127/82   Pulse: 78   Resp: 18   Temp: 97.5 °F (36.4 °C)   SpO2: 97%   Weight: 65.4 kg (144 lb 3.2 oz)   Height: 158.8 cm (62.52\")   PainSc:   8   PainLoc: Back           Objective    Physical Exam  Vitals reviewed.   Constitutional:       General: She is not in acute distress.  Pulmonary:      Effort: Pulmonary effort is normal. No respiratory distress.   Musculoskeletal:      Comments: Ambulation: Without assistive device   Lumbar Exam:  Appearance: Scoliotic curve absent and scarring absent  Palpated over lumbosacral paravertebral regions " and transverse processes with negative tenderness appreciated, Bilateral.   Sacroiliac joints are tender, Left.  Trochanteric bursa are tender, Left.  Straight leg raise is negative radiculopathy, Bilateral.  Slump test is negative  radiculopathy, Bilateral.  Facet loading is negative for pain, Bilateral.  Paraspinal/adjacent lumbar musculature are not tender to palpation, Bilateral.  Win Waldemar's test is positive sacroiliac pain, Left.   Skin:     General: Skin is warm and dry.   Neurological:      Mental Status: She is alert.      Deep Tendon Reflexes:      Reflex Scores:       Patellar reflexes are 3+ on the right side and 3+ on the left side.       Achilles reflexes are 3+ on the right side and 3+ on the left side.     Comments: 2 beats of clonus bilaterally.   Psychiatric:         Mood and Affect: Mood normal.         Thought Content: Thought content normal.               Assessment & Plan   Diagnoses and all orders for this visit:    1. Spinal stenosis of lumbar region with neurogenic claudication (Primary)  -     meloxicam (MOBIC) 15 MG tablet; Take 1 tablet by mouth Daily As Needed for Moderate Pain. Take with food.  Dispense: 30 tablet; Refill: 1  -     Case Request    2. Lumbar radiculopathy  -     meloxicam (MOBIC) 15 MG tablet; Take 1 tablet by mouth Daily As Needed for Moderate Pain. Take with food.  Dispense: 30 tablet; Refill: 1  -     Case Request    3. Sacroiliac joint pain    4. Trochanteric bursitis of left hip    5. Chronic pain syndrome        - Pertinent labs reviewed.   - Pertinent imaging reviewed.   - Meloxicam prescribed (she will start this after her vocal cord surgery).  Discussed medication with the patient.  Included in this discussion was the potential for side effects and adverse events.  Patient verbalized understanding and wished to proceed.  Prescription will be sent to pharmacy.   - Will schedule for L5-S1 Epidural steroid injection.  Risks discussed including but not limited  to bleeding, bruising, infection, damage to surrounding structures, headache, and rare things such as being paralyzed, seizure, stroke, heart attack and death.  The risk of steroid medications include but are not limited to immunosuppression, which can increase the risk of karon an infectious disease as well as decrease the immune response to a vaccine.    - May consider cymbalta or a muscle relaxant in the future if pain is not well-controlled.   - Explained that she would need to discontinue her use of THC if we were to consider any controlled substance like Lyrica or hydrocodone.  She understands.   - Discussed referral to neurosurgery.  She is adamant that she will not have surgery as long as she can walk and care for herself.  Reviewed cauda equina symptoms and ER precautions.  She understands.   - Yolanda Lee Hatchett reports a pain score of 8.  Given her pain assessment as noted, treatment options were discussed and the following options were decided upon as a follow-up plan to address the patient's pain: prescription for non-opiod analgesics and steroid injections.  - Yolanda Lee Hatchett  reports that she has been smoking cigarettes. She started smoking about 51 years ago. She has a 25.00 pack-year smoking history. She has never used smokeless tobacco.. I have educated her on the risk of diseases from using tobacco products such as cancer, COPD and heart disease. I advised her to quit and she is not willing to quit. I spent 3  minutes counseling the patient.    --- Follow-up for L5-S1 Epidural steroid injection and office visit in 4 weeks.        While examining this patient, I wore protective equipment including a mask and gloves.  I washed my hands before and after this patient encounter.  The patient wore a mask throughout the visit as well.     Jenny Bauman MD  Pain Management    EMR Dragon/Transcription disclaimer:   Much of this encounter note is an electronic transcription/translation of  spoken language to printed text. The electronic translation of spoken language may permit erroneous, or at times, nonsensical words or phrases to be inadvertently transcribed; Although I have reviewed the note for such errors, some may still exist.

## 2023-04-21 NOTE — PATIENT INSTRUCTIONS
Ask your ENT surgeon if it's okay for you to have an epidural steroid injection close to their surgery.     Do not start Meloxicam until after your surgery.     What to expect if we're setting up an injection/procedure    - I have placed the order today, we'll start speaking to your insurance for authorization (this can sometimes take a few weeks).   - You should be scheduled for your procedure before you leave the office.  If you were not, please call our office to schedule.   - If you have any symptoms of an infection or of COVID, please reschedule your procedure.   - LIGHT Intravenous (IV) sedation is offered for some procedures: you will NOT be put to sleep.  If you plan to have sedation, do not eat or drink anything on the day of your injection.   - Most procedures require having someone drive you.  Please make sure you arrange a  unless told otherwise.   - If you take a blood thinner and you were not instructed whether to continue or hold it, please contact us with any questions.

## 2023-04-21 NOTE — PROGRESS NOTES
The patient has a pain history of the following:  Chronic low back pain    Previous interventions that the patient has received include:   None     Pain medications include:  Naproxen  Tylenol   THC helps some     Previously: Gabapentin (dizziness), muscle relaxant     Other conservative modalities which the patient reports using include:  Physical Therapy: yes - no benefit   Chiropractor: no  Massage Therapy: no  TENS: no  Neck or back surgery: no  Past pain management: yes, [facility]    Past Significant Surgical History:  None     HPI:       CHIEF COMPLAINT: Back Pain    Yolanda Lee Hatchett is a 63 y.o. female referred here by Jovany Wiley MD. Yolanda Lee Hatchett presents to the office for evaluation and treatment of Back Pain      History of Present Illness  Onset:  20+ years   Inciting Event:  Worked in a hospital  Location:  Low back  Pain: Pain described as numbness and ache. Located in the low back and does radiate into bilateral lower extremities, the left being worse than the right.  Severity:  Pain rated as a 8 /10.  Apportions pain as 50%  back pain and 50% extremity pain.  Symptoms have been constant.  Exacerbation:  Walking, standing sitting - any prolonged activity.   Alleviation:  Shifting positions.  Associated Symptoms:   She denies any new onset of bowel or bladder weakness, significant leg weakness or saddle anesthesia. Denies balance problems or lower extremity incoordination.  Ambulates: Without assistive device   Limitations: This pain limits the patient from playing with her grandchildren.   Goals: Functional goals include ability to do the above.      [place] 44    When she was previously in pain management, she was taking hydrocodone-acetaminophen 7.5-325mg.  She stopped going due to insurance issues and has been self medicating with marijuana daily.  She never received injections because her insurance never approved them as an option.  She states that she's unsure if she would be  able to stop using marijuana.  She took gabapentin in the past and struggled with vertigo.  Currently she takes Tylenol and Aleve and has some benefit, however it doesn't last very long.        PEG Assessment   What number best describes your pain on average in the past week?7  What number best describes how, during the past week, pain has interfered with your enjoyment of life?6  What number best describes how, during the past week, pain has interfered with your general activity?  8        Current Outpatient Medications:   •  albuterol sulfate HFA (Ventolin HFA) 108 (90 Base) MCG/ACT inhaler, Inhale 2 puffs Every 4 (Four) Hours As Needed for Wheezing., Disp: 17 g, Rfl: 3  •  Anoro Ellipta 62.5-25 MCG/ACT aerosol powder  inhaler, 1 puff Daily., Disp: , Rfl:   •  aspirin 81 MG EC tablet, Take 1 tablet by mouth Daily., Disp: , Rfl:   •  meloxicam (MOBIC) 15 MG tablet, Take 1 tablet by mouth Daily As Needed for Moderate Pain. Take with food., Disp: 30 tablet, Rfl: 1    The following portions of the patient's history were reviewed and updated as appropriate: allergies, current medications, past family history, past medical history, past social history, past surgical history and problem list.      REVIEW OF PERTINENT MEDICAL DATA    3/24/23 MRI LUMBAR SPINE WITH AND WITHOUT CONTRAST ON 03/24/2023     CLINICAL HISTORY: Patient has history of lung cancer and has back pain,  bilateral lower extremity numbness and weakness.     TECHNIQUE: Sagittal T1, proton density and fat-suppressed T2 and  postcontrast sagittal fat-suppressed T1-weighted images were obtained  lumbar spine. In addition thin cut axial T1-weighted images were  obtained angled through the interspaces from T11 to S1 axial T2 and  postcontrast axial T1-weighted images were obtained from T11 to S1.     This is correlated to prior post myelogram CT lumbar spine from [facility] on 02/12/2016.     FINDINGS: The distal thoracic cord and conus is  normal in signal  intensity. The conus terminates at the level of the inferior body of L2  which is borderline but probably lower limits of normal.     At T12-L1 the disc space and facets are normal with no canal or  foraminal narrowing.     At L1-2 the disc space and facets are normal with no canal or foraminal  narrowing.     At L2 there is mild bilateral facet overgrowth, mild disc space  narrowing and degenerative endplate change, 2 mm retrolisthesis of L2 on  L3 and diffuse annular disc bulge and there is mild-to-moderate  narrowing of the thecal sac and there is mild bilateral foraminal  narrowing.     At L3-4 there is mild-to-moderate bilateral facet overgrowth. There is  disc space narrowing and degenerative endplate changes, 3 mm  retrolisthesis of L3 on L4, diffuse annular spurring and bulging disc  material. Prominent posterior epidural fat and a combination of all of  the above findings contributes to severe generalized narrowing of the  thecal sac diminished spinal fluid bathing the cauda equina at this  level and there is spurring bulging disc material extending into the  neural foramina with mild-to-moderate bilateral foraminal narrowing.     At L4-5 there is mild-to-moderate bilateral facet overgrowth. There is  severe disc space narrowing and there are degenerative endplate changes  diffuse posterior disc osteophyte complex effaces the ventral aspect of  thecal sac and there is prominent posterior epidural fat and there is  severe narrowing of the thecal sac and diminished spinal fluid bathing  the cauda equina at this level. There is some slight narrowing of the  lateral recesses posterior spurs abut the ventral aspect traversing L5  nerve roots. There is spurring into the inferior aspect of the neural  foramina bilaterally with mild-to-moderate right and there is moderate  left bony foraminal narrowing.     At L5-S1 there is mild bilateral facet overgrowth and there is severe  disc space narrowing  and there are degenerative endplate changes, 5 mm  retrolisthesis of L5 with respect to S1 with uncovering posterior  inferior aspect of L5-S1 disc space and disc material bulging along the  posterior superior endplate of S1 eccentric right paracentrally where it  protrudes where there is some herniated disc material along the right  posterior superior endplate of S1. There is some narrowing of the thecal  sac. There is slight narrowing lateral recesses. There is spurring into  the foramina bilaterally and there is moderate bilateral bony foraminal  narrowing.     IMPRESSION:  1. The disc space and facets are normal with no canal or foraminal  narrowing at T12-L1 and L1-L2.  2. At L2-3 there is mild bilateral facet overgrowth, disc space  narrowing and degenerative endplate changes and a 3 mm retrolisthesis of  L2 with respect to L3 and diffuse annular disc bulge and posterior  epidural fat contribute to mild-to-moderate narrowing of the thecal sac  and there is mild bilateral foraminal narrowing.  3. At L3-4 there is mild-to-moderate bilateral facet overgrowth, disc  space narrowing and degenerative endplate change, 3 mm retrolisthesis of  L3 on L4 and there is a diffuse posterior disc osteophyte complex and  prominent posterior epidural fat, the combination of which contributes  to severe narrowing of the thecal sac with diminished spinal fluid  bathing the cauda equina at this level. There is spurring bulging disc  material extending into the neural foramina resulting in  mild-to-moderate bilateral foraminal narrowing.  4. At L4-5 there is mild-to-moderate bilateral facet overgrowth and  there is severe disc space narrowing and degenerative endplate changes  and 2-3 mm retrolisthesis of L4 on L5 and prominent diffuse posterior  disc osteophyte complex and prominent posterior epidural fat and there  is severe narrowing of the thecal sac, diminished spinal fluid bathing  the cauda equina at this level, spurring  "into the foramina and there is  mild-to-moderate right and moderate left foraminal narrowing.  5. At L5-S1 there is mild bilateral facet overgrowth, disc space  narrowing and degenerative endplate changes 5 mm retrolisthesis of L5  with respect to S1 with uncovering of the posterior inferior aspect of  L5-S1 disc space and disc material diffusely bulging along the posterior  superior endplate of S1. There is eccentric herniated disc material  along the right paracentral superior body and endplate of S1 measuring 8  x 5 mm abuts the anteromedial aspect traversing right S1 nerve root but  does not have mass effect on it or displace the right S1 nerve root.  There is spurring into the foramina and there is moderate bilateral  foraminal narrowing.     This report was finalized on 3/24/2023 1:53 PM by Dr. Cody Mckeon M.D.         3/24/23 Creatinine 0.90    2/23/23 Platelets 288 (10*3)    Review of Systems   Gastrointestinal: Negative for constipation and diarrhea.   Genitourinary: Negative for difficulty urinating.   Musculoskeletal: Positive for back pain.   Neurological: Positive for numbness (left leg). Negative for weakness.   Psychiatric/Behavioral: Negative for sleep disturbance and suicidal ideas. The patient is not nervous/anxious.      I have reviewed and confirmed the accuracy of the ROS as documented by the MA/LPN/RN Jenny Bauman MD      Vitals:    04/21/23 0844   BP: 127/82   Pulse: 78   Resp: 18   Temp: 97.5 °F (36.4 °C)   SpO2: 97%   Weight: 65.4 kg (144 lb 3.2 oz)   Height: 158.8 cm (62.52\")   PainSc:   8   PainLoc: Back           Objective    Physical Exam  Vitals reviewed.   Constitutional:       General: She is not in acute distress.  Pulmonary:      Effort: Pulmonary effort is normal. No respiratory distress.   Musculoskeletal:      Comments: Ambulation: Without assistive device   Lumbar Exam:  Appearance: Scoliotic curve absent and scarring absent  Palpated over lumbosacral paravertebral regions " and transverse processes with negative tenderness appreciated, Bilateral.   Sacroiliac joints are tender, Left.  Trochanteric bursa are tender, Left.  Straight leg raise is negative radiculopathy, Bilateral.  Slump test is negative  radiculopathy, Bilateral.  Facet loading is negative for pain, Bilateral.  Paraspinal/adjacent lumbar musculature are not tender to palpation, Bilateral.  Win Waldemar's test is positive sacroiliac pain, Left.   Skin:     General: Skin is warm and dry.   Neurological:      Mental Status: She is alert.      Deep Tendon Reflexes:      Reflex Scores:       Patellar reflexes are 3+ on the right side and 3+ on the left side.       Achilles reflexes are 3+ on the right side and 3+ on the left side.     Comments: 2 beats of clonus bilaterally.   Psychiatric:         Mood and Affect: Mood normal.         Thought Content: Thought content normal.               Assessment & Plan   Diagnoses and all orders for this visit:    1. Spinal stenosis of lumbar region with neurogenic claudication (Primary)  -     meloxicam (MOBIC) 15 MG tablet; Take 1 tablet by mouth Daily As Needed for Moderate Pain. Take with food.  Dispense: 30 tablet; Refill: 1  -     Case Request    2. Lumbar radiculopathy  -     meloxicam (MOBIC) 15 MG tablet; Take 1 tablet by mouth Daily As Needed for Moderate Pain. Take with food.  Dispense: 30 tablet; Refill: 1  -     Case Request    3. Sacroiliac joint pain    4. Trochanteric bursitis of left hip    5. Chronic pain syndrome        - Pertinent labs reviewed.   - Pertinent imaging reviewed.   - Meloxicam prescribed (she will start this after her vocal cord surgery).  Discussed medication with the patient.  Included in this discussion was the potential for side effects and adverse events.  Patient verbalized understanding and wished to proceed.  Prescription will be sent to pharmacy.   - Will schedule for L5-S1 Epidural steroid injection.  Risks discussed including but not limited  to bleeding, bruising, infection, damage to surrounding structures, headache, and rare things such as being paralyzed, seizure, stroke, heart attack and death.  The risk of steroid medications include but are not limited to immunosuppression, which can increase the risk of karon an infectious disease as well as decrease the immune response to a vaccine.    - May consider cymbalta or a muscle relaxant in the future if pain is not well-controlled.   - Explained that she would need to discontinue her use of THC if we were to consider any controlled substance like Lyrica or hydrocodone.  She understands.   - Discussed referral to neurosurgery.  She is adamant that she will not have surgery as long as she can walk and care for herself.  Reviewed cauda equina symptoms and ER precautions.  She understands.   - Yolanda Lee Hatchett reports a pain score of 8.  Given her pain assessment as noted, treatment options were discussed and the following options were decided upon as a follow-up plan to address the patient's pain: prescription for non-opiod analgesics and steroid injections.  - Yolanda Lee Hatchett  reports that she has been smoking cigarettes. She started smoking about 51 years ago. She has a 25.00 pack-year smoking history. She has never used smokeless tobacco.. I have educated her on the risk of diseases from using tobacco products such as cancer, COPD and heart disease. I advised her to quit and she is not willing to quit. I spent 3  minutes counseling the patient.    --- Follow-up for L5-S1 Epidural steroid injection and office visit in 4 weeks.        While examining this patient, I wore protective equipment including a mask and gloves.  I washed my hands before and after this patient encounter.  The patient wore a mask throughout the visit as well.     Jenny Bauman MD  Pain Management    EMR Dragon/Transcription disclaimer:   Much of this encounter note is an electronic transcription/translation of  spoken language to printed text. The electronic translation of spoken language may permit erroneous, or at times, nonsensical words or phrases to be inadvertently transcribed; Although I have reviewed the note for such errors, some may still exist.

## 2023-04-24 ENCOUNTER — TRANSCRIBE ORDERS (OUTPATIENT)
Dept: SURGERY | Facility: SURGERY CENTER | Age: 63
End: 2023-04-24
Payer: MEDICAID

## 2023-04-24 DIAGNOSIS — Z41.9 SURGERY, ELECTIVE: Primary | ICD-10-CM

## 2023-04-24 PROBLEM — M54.16 LUMBAR RADICULOPATHY: Status: ACTIVE | Noted: 2023-04-24

## 2023-04-24 PROBLEM — M48.062 SPINAL STENOSIS OF LUMBAR REGION WITH NEUROGENIC CLAUDICATION: Status: ACTIVE | Noted: 2023-04-24

## 2023-05-12 NOTE — DISCHARGE INSTRUCTIONS
[facility] - Post-procedure Instructions          --  While there are no absolute restrictions, it is recommended that you do not perform strenuous activity today. In the morning, you may resume your level of activity as before your block.    --  If you have a band-aid at your injection site, please remove it later today. Observe the area for any redness, swelling, pus-like drainage, or a temperature over 101°. If any of these symptoms occur, please call your doctor at [phone]. If after office hours, leave a message and the on-call provider will return your call.    --  Ice may be applied to your injection site. It is recommended you avoid direct heat (heating pad; hot tub) for 1-2 days.    --  Call [facility] Management at [phone] if you experience persistent headache, persistent bleeding from the injection site, or severe pain not relieved by heat or oral medication.    --  Do not make important decisions today.    --  Due to the effects of the block and/or the I.V. Sedation, DO NOT drive or operate hazardous machinery for 12 hours.  Local anesthetics may cause numbness after procedure and precautions must be taken with regards to operating equipment as well as with walking, even if ambulating with assistance of another person or with an assistive device.    --  Do not drink alcohol for 12 hours.    -- You may return to work tomorrow, or as directed by your referring doctor.    --  Occasionally you may notice a slight increase in your pain after the procedure. This should start to improve within the next 24-48 hours. Radiofrequency ablation procedure pain may last 3-4 weeks.    --  It may take as long as 3-4 days before you notice a gradual improvement in your pain and/or other symptoms.    -- You may continue to take your prescribed pain medication as needed.    --  Some normal possible side effects of steroid use could include fluid retention, increased blood sugar, dull headache,  increased sweating, increased appetite, mood swings and flushing.    --  Diabetics are recommended to watch their blood glucose level closely for 24-48 hours after the injection.    --  Must stay in PACU for 20 min upon arrival and prove no leg weakness before being discharged.    --  IN THE EVENT OF A LIFE THREATENING EMERGENCY, (CHEST PAIN, BREATHING DIFFICULTIES, PARALYSIS…) YOU SHOULD GO TO YOUR NEAREST EMERGENCY ROOM.    --  You should be contacted by our office within 2-3 days to schedule follow up or next appointment date.  If not contacted within 7 days, please call the office at [phone]

## 2023-05-15 ENCOUNTER — HOSPITAL ENCOUNTER (OUTPATIENT)
Facility: SURGERY CENTER | Age: 63
Setting detail: HOSPITAL OUTPATIENT SURGERY
Discharge: HOME | End: 2023-05-15
Attending: ANESTHESIOLOGY | Admitting: ANESTHESIOLOGY
Payer: MEDICAID

## 2023-05-15 ENCOUNTER — HOSPITAL ENCOUNTER (OUTPATIENT)
Dept: GENERAL RADIOLOGY | Facility: SURGERY CENTER | Age: 63
Setting detail: HOSPITAL OUTPATIENT SURGERY
End: 2023-05-15
Payer: MEDICAID

## 2023-05-15 VITALS
BODY MASS INDEX: 26.68 KG/M2 | HEIGHT: 62 IN | OXYGEN SATURATION: 96 % | RESPIRATION RATE: 15 BRPM | TEMPERATURE: 97.8 F | WEIGHT: 145 LBS | SYSTOLIC BLOOD PRESSURE: 126 MMHG | HEART RATE: 77 BPM | DIASTOLIC BLOOD PRESSURE: 102 MMHG

## 2023-05-15 DIAGNOSIS — M48.062 SPINAL STENOSIS OF LUMBAR REGION WITH NEUROGENIC CLAUDICATION: ICD-10-CM

## 2023-05-15 DIAGNOSIS — M54.16 LUMBAR RADICULOPATHY: ICD-10-CM

## 2023-05-15 DIAGNOSIS — Z41.9 SURGERY, ELECTIVE: ICD-10-CM

## 2023-05-15 PROCEDURE — 25010000002 DEXAMETHASONE SODIUM PHOSPHATE 100 MG/10ML SOLUTION 10 ML VIAL: Performed by: ANESTHESIOLOGY

## 2023-05-15 PROCEDURE — 76000 FLUOROSCOPY <1 HR PHYS/QHP: CPT

## 2023-05-15 PROCEDURE — 25010000002 FENTANYL CITRATE (PF) 50 MCG/ML SOLUTION: Performed by: ANESTHESIOLOGY

## 2023-05-15 PROCEDURE — 77002 NEEDLE LOCALIZATION BY XRAY: CPT

## 2023-05-15 PROCEDURE — 25510000001 IOPAMIDOL 61 % SOLUTION 30 ML VIAL: Performed by: ANESTHESIOLOGY

## 2023-05-15 PROCEDURE — 62323 NJX INTERLAMINAR LMBR/SAC: CPT | Performed by: ANESTHESIOLOGY

## 2023-05-15 PROCEDURE — 25010000002 MIDAZOLAM PER 1 MG: Performed by: ANESTHESIOLOGY

## 2023-05-15 RX ORDER — SODIUM CHLORIDE 0.9 % (FLUSH) 0.9 %
10 SYRINGE (ML) INJECTION AS NEEDED
Status: DISCONTINUED | OUTPATIENT
Start: 2023-05-15 | End: 2023-05-15 | Stop reason: HOSPADM

## 2023-05-15 RX ORDER — MIDAZOLAM HYDROCHLORIDE 1 MG/ML
INJECTION INTRAMUSCULAR; INTRAVENOUS AS NEEDED
Status: DISCONTINUED | OUTPATIENT
Start: 2023-05-15 | End: 2023-05-15 | Stop reason: HOSPADM

## 2023-05-15 RX ORDER — FENTANYL CITRATE 50 UG/ML
INJECTION, SOLUTION INTRAMUSCULAR; INTRAVENOUS AS NEEDED
Status: DISCONTINUED | OUTPATIENT
Start: 2023-05-15 | End: 2023-05-15 | Stop reason: HOSPADM

## 2023-05-15 RX ORDER — SODIUM CHLORIDE 0.9 % (FLUSH) 0.9 %
10 SYRINGE (ML) INJECTION EVERY 12 HOURS SCHEDULED
Status: DISCONTINUED | OUTPATIENT
Start: 2023-05-15 | End: 2023-05-15 | Stop reason: HOSPADM

## 2023-05-15 NOTE — OP NOTE
L5/S1 Interlaminar Lumbar Epidural Steroid Injection   [facility]    PREOPERATIVE DIAGNOSIS:   Lumbar Spinal Stenosis WITH Neurogenic Claudication and Lumbar Radiculopathy  POSTOPERATIVE DIAGNOSIS:  Same as preop diagnosis    PROCEDURE:   Lumbar Epidural Steroid Injection, Therapeutic Interlaminar Injection, with epidurogram, at  L5/S1 level    PRE-PROCEDURE DISCUSSION WITH PATIENT:    Risks and complications were discussed with the patient prior to starting the procedure and informed consent was obtained.  We discussed various topics including but not limited to bleeding, infection, injury, paralysis, nerve injury, dural puncture, coma, death, worsening of clinical picture, lack of pain relief, and postprocedural soreness.    SURGEON:  Jenny Bauman MD    REASON FOR PROCEDURE:    Diagnostic injection at this level is needed and Stenotic area is noted, and is likely contributing to this chronic &/or recurrent pain.     SEDATION:  Light sedation was used for this procedure which included and Versed 1mg & Fentanyl 50mcg  ANESTHETIC:  Marcaine 0.25%  STEROID:   15mg dexamethasone    DESCRIPTON OF PROCEDURE:    After obtaining informed consent, I.V. was started in the preop area.   The patient was taken to the operating room and placed in the prone position.  EKG, blood pressure, and pulse oximeter were monitored throughout, and sedation was provided as needed by the RN under my guidance. All pressure points were well padded.  The lumbar spine area was prepped with Chloraprep and draped in a sterile fashion.      AP fluoroscopic image was used to visualize the L5/S1 interspace.  The skin and subcutaneous tissue over the area was anesthetized with 1% Lidocaine.  An 18-Gauge Tuohy needle was then advanced through the anesthetized skin tract under fluoroscopic guidance in a coaxial view using a loss of resistance technique.  Lateral fluoroscopy was used to verify appropriate needle depth.  Once the  needle tip was felt to be in the posterior epidural space, aspiration was noted to be negative for blood or CSF.  A volume of 1mL of Isovue was then injected under live fluoroscopy in an AP view which produced good epidural spread with no evidence of loculation, vascular run-off or intrathecal spread.  Subsequently, a total volume of 5mL consisting of 15mg of dexamethasone, 0.5mL of 0.25% bupivcacaine and normal saline was injected without resistance.  The needle was removed intact.     ESTIMATED BLOOD LOSS:  <5 mL  SPECIMENS:  None    COMPLICATIONS:     No complications were noted., There was no indication of vascular uptake on live injection of contrast dye. and There was no indication of intrathecal uptake on live injection of contrast dye.    TOLERANCE & DISCHARGE CONDITION:    The patient tolerated the procedure well.  The patient was transported to the recovery area without difficulties.  The patient was discharged to home under the care of family in stable and satisfactory condition.    PLAN OF CARE:  1. The patient was given our standard instruction sheet.  2. The patient will Return to clinic 4-6 wks  3. The patient will resume all medications as per the medication reconciliation sheet.

## 2023-06-10 DIAGNOSIS — M48.062 SPINAL STENOSIS OF LUMBAR REGION WITH NEUROGENIC CLAUDICATION: ICD-10-CM

## 2023-06-10 DIAGNOSIS — M54.16 LUMBAR RADICULOPATHY: ICD-10-CM

## 2023-06-12 RX ORDER — MELOXICAM 15 MG/1
TABLET ORAL
Qty: 30 TABLET | Refills: 0 | OUTPATIENT
Start: 2023-06-12

## 2023-06-13 ENCOUNTER — OFFICE VISIT (OUTPATIENT)
Dept: PAIN MEDICINE | Facility: CLINIC | Age: 63
End: 2023-06-13
Payer: MEDICAID

## 2023-06-13 ENCOUNTER — PREP FOR SURGERY (OUTPATIENT)
Dept: PAIN MEDICINE | Facility: CLINIC | Age: 63
End: 2023-06-13

## 2023-06-13 VITALS
TEMPERATURE: 97.3 F | BODY MASS INDEX: 26.24 KG/M2 | HEART RATE: 62 BPM | DIASTOLIC BLOOD PRESSURE: 90 MMHG | SYSTOLIC BLOOD PRESSURE: 128 MMHG | RESPIRATION RATE: 18 BRPM | HEIGHT: 62 IN | OXYGEN SATURATION: 99 % | WEIGHT: 142.6 LBS

## 2023-06-13 DIAGNOSIS — G89.4 CHRONIC PAIN SYNDROME: ICD-10-CM

## 2023-06-13 DIAGNOSIS — M54.16 LUMBAR RADICULOPATHY: Primary | ICD-10-CM

## 2023-06-13 DIAGNOSIS — M70.62 TROCHANTERIC BURSITIS OF LEFT HIP: ICD-10-CM

## 2023-06-13 DIAGNOSIS — M54.16 LUMBAR RADICULOPATHY: ICD-10-CM

## 2023-06-13 DIAGNOSIS — M53.3 SACROILIAC JOINT PAIN: ICD-10-CM

## 2023-06-13 DIAGNOSIS — M48.062 SPINAL STENOSIS OF LUMBAR REGION WITH NEUROGENIC CLAUDICATION: Primary | ICD-10-CM

## 2023-06-13 RX ORDER — SODIUM CHLORIDE 0.9 % (FLUSH) 0.9 %
10 SYRINGE (ML) INJECTION EVERY 12 HOURS SCHEDULED
OUTPATIENT
Start: 2023-06-13

## 2023-06-13 RX ORDER — MELOXICAM 15 MG/1
15 TABLET ORAL DAILY PRN
Qty: 60 TABLET | Refills: 1 | Status: SHIPPED | OUTPATIENT
Start: 2023-06-13

## 2023-06-13 RX ORDER — SODIUM CHLORIDE 0.9 % (FLUSH) 0.9 %
10 SYRINGE (ML) INJECTION AS NEEDED
OUTPATIENT
Start: 2023-06-13

## 2023-06-13 NOTE — PROGRESS NOTES
CHIEF COMPLAINT  Back pain    Subjective   Yolanda Lee Hatchett is a 63 y.o. female  who presents to the office for follow-up of procedure.  She completed a L5/S1 LESI on 5/15/23 performed by Dr. Bauman for management of back pain. Patient reports 100% relief from the procedure x 3 weeks.     Today pain is 6/10VAS in severity. Pain is located in her low back and radiates down left lateral/posterior thigh into foot. Describes this pain as a nearly continuous aching and burning. Pain is worsened by prolonged standing or sitting, walking long distances, and increased physical activity. Pain improved with rest/reposition, heat/ice, and Meloxicam 15mg daily. Patient also self medicates with THC for pain relief.     Unable to tolerate Gabapentin - caused vertigo     Procedures:  5/15/23 - L5/S1 LESI - 100% relief x 3 weeks     Back Pain  This is a chronic problem. The current episode started more than 1 year ago. The problem occurs constantly. The problem has been gradually worsening since onset. The pain is present in the lumbar spine. The quality of the pain is described as aching and burning. The pain radiates to the left thigh (left lateral/posterior leg - pain radiates into left foot). The pain is at a severity of 6/10. The pain is moderate. The pain is The same all the time. The symptoms are aggravated by sitting, standing and position (walking long distances). Associated symptoms include headaches, numbness (leg and feet) and weakness (depending on walks). Pertinent negatives include no abdominal pain or dysuria.      PEG Assessment   What number best describes your pain on average in the past week?6  What number best describes how, during the past week, pain has interfered with your enjoyment of life?6  What number best describes how, during the past week, pain has interfered with your general activity?  6    Review of Pertinent Medical Data ---  Reviewed office note from Dr. Jenny Bauman from 4/21/23. Patient was  "referred by Dr. Jovany Wiley for evaluation and treatment of back pain. Back pain has been present for over 20 years. She has tried physical therapy with no benefit. Medications include Naproxen, Tylenol, and THC. She was previous a patient at [facility]. She was taking Hydrocodone prescribed by [facility] but stopped going due to insurance issues. No interventional procedures due to insurance denial.  She currently utilizes THC for pain relief. Plan was to proceed with a L5/S1 LESI and patient was started on Meloxicam 15mg. Patient was not interested in a neurosurgery referral at that time.     The following portions of the patient's history were reviewed and updated as appropriate: allergies, current medications, past family history, past medical history, past social history, past surgical history, and problem list.    Review of Systems   Constitutional:  Positive for activity change. Negative for fatigue.   Gastrointestinal:  Negative for abdominal pain, constipation and diarrhea.   Genitourinary:  Negative for difficulty urinating and dysuria.   Musculoskeletal:  Positive for back pain.   Neurological:  Positive for weakness (depending on walks), numbness (leg and feet) and headaches. Negative for dizziness and light-headedness.   Psychiatric/Behavioral:  Positive for sleep disturbance. Negative for agitation and suicidal ideas. The patient is not nervous/anxious.      I have reviewed and confirmed the accuracy of the ROS as documented by the MA/LPN/RN NE Narvaez     Vitals:    06/13/23 0908   BP: 128/90   BP Location: Right arm   Patient Position: Sitting   Cuff Size: Adult   Pulse: 62   Resp: 18   Temp: 97.3 °F (36.3 °C)   TempSrc: Temporal   SpO2: 99%   Weight: 64.7 kg (142 lb 9.6 oz)   Height: 157.5 cm (62\")   PainSc:   6   PainLoc: Back     Objective   Physical Exam  Constitutional:       Appearance: Normal appearance.   HENT:      Head: Normocephalic.   Cardiovascular:      Rate and " Rhythm: Normal rate and regular rhythm.   Pulmonary:      Effort: Pulmonary effort is normal.      Breath sounds: Normal breath sounds.   Musculoskeletal:      Cervical back: Normal range of motion.      Lumbar back: Tenderness present. No spasms. Decreased range of motion. Positive left straight leg raise test. Negative right straight leg raise test.   Skin:     General: Skin is warm and dry.      Capillary Refill: Capillary refill takes less than 2 seconds.   Neurological:      General: No focal deficit present.      Mental Status: She is alert and oriented to person, place, and time.   Psychiatric:         Mood and Affect: Mood normal.         Behavior: Behavior normal.         Thought Content: Thought content normal.         Cognition and Memory: Cognition normal.     Assessment & Plan   Diagnoses and all orders for this visit:    1. Spinal stenosis of lumbar region with neurogenic claudication (Primary)  -     meloxicam (MOBIC) 15 MG tablet; Take 1 tablet by mouth Daily As Needed for Moderate Pain. Take with food.  Dispense: 60 tablet; Refill: 1    2. Lumbar radiculopathy  -     meloxicam (MOBIC) 15 MG tablet; Take 1 tablet by mouth Daily As Needed for Moderate Pain. Take with food.  Dispense: 60 tablet; Refill: 1    3. Sacroiliac joint pain    4. Trochanteric bursitis of left hip    5. Chronic pain syndrome    --- Left L5 & S1 TF LESI  ---  Indications for epidural injection:  Plan is to proceed with epidural at the appropriate level.  If the patient receives significant pain reduction and improvement in function and the plan will be to repeat the epidural when the pain worsens.  If a second epidural provides at least 6 weeks of sustained improvement that includes both pain reduction and improvement in function then an epidural injection could be repeated once again at the same level.  This is a mutual decision between the clinician and the patient that includes discussions including risks and benefits in  detail as well as alternative therapies.  Patient's questions were answered to their satisfaction and to their understanding.  ---   Discussed with the patient that sedation is optional for this procedure.  The sedation offered is called conscious sedation which is different from general anesthesia that is utilized in surgical procedures. The dosing of the sedation is determined by the physician and they will be monitored throughout the procedure. With conscious sedation it is possible to remember parts or all of the procedure, this is normal. They will need to have a  with them as driving is prohibited following conscious sedation.      NPO instructions for conscious sedation:  --- Do not eat 6 hours prior to the procedure.   --- Do not drink any dairy or citrus 4 hours prior to the procedure.   --- Do not drink anything, including clear liquids, 2 hours prior to procedure.      If the NPO instructions are not followed then the procedure may be performed without sedation or the procedure will need to be rescheduled.   --- Continue Meloxicam. Refill sent to pharmacy.   --- Follow-up for procedure     DEEPTI REPORT  As the clinician, I personally reviewed the DEEPTI from 6/13/23 while the patient was in the office today.    Dictated utilizing Dragon dictation.

## 2023-07-28 ENCOUNTER — TELEPHONE (OUTPATIENT)
Dept: ONCOLOGY | Facility: CLINIC | Age: 63
End: 2023-07-28
Payer: MEDICAID

## 2023-07-28 NOTE — TELEPHONE ENCOUNTER
Caller: Hatchett, Yolanda Lee    Relationship to patient: Self    Best call back number: [phone] MAY LEAVE     Chief complaint: R/S    Type of visit: LAB AND F/U1    Requested date: REQUEST SAME DAY BUT LATE AFTERNOON, IF NOT NEXT AVLIABLE LATE AFTERNOON    If rescheduling, when is the original appointment: 8-10    Additional notes:

## 2023-08-03 DIAGNOSIS — Z12.31 SCREENING MAMMOGRAM FOR BREAST CANCER: Primary | ICD-10-CM

## 2023-08-21 ENCOUNTER — OFFICE VISIT (OUTPATIENT)
Dept: PAIN MEDICINE | Facility: CLINIC | Age: 63
End: 2023-08-21
Payer: MEDICAID

## 2023-08-21 ENCOUNTER — PREP FOR SURGERY (OUTPATIENT)
Dept: SURGERY | Facility: SURGERY CENTER | Age: 63
End: 2023-08-21
Payer: MEDICAID

## 2023-08-21 VITALS
HEART RATE: 81 BPM | TEMPERATURE: 96 F | HEIGHT: 62 IN | OXYGEN SATURATION: 99 % | DIASTOLIC BLOOD PRESSURE: 70 MMHG | SYSTOLIC BLOOD PRESSURE: 130 MMHG | BODY MASS INDEX: 25.62 KG/M2 | RESPIRATION RATE: 12 BRPM | WEIGHT: 139.2 LBS

## 2023-08-21 DIAGNOSIS — M70.62 TROCHANTERIC BURSITIS OF LEFT HIP: ICD-10-CM

## 2023-08-21 DIAGNOSIS — M47.816 LUMBAR FACET ARTHROPATHY: Primary | ICD-10-CM

## 2023-08-21 DIAGNOSIS — M48.062 SPINAL STENOSIS OF LUMBAR REGION WITH NEUROGENIC CLAUDICATION: ICD-10-CM

## 2023-08-21 DIAGNOSIS — M54.16 LUMBAR RADICULOPATHY: Primary | ICD-10-CM

## 2023-08-21 DIAGNOSIS — M53.3 SACROILIAC JOINT PAIN: ICD-10-CM

## 2023-08-21 DIAGNOSIS — G89.4 CHRONIC PAIN SYNDROME: ICD-10-CM

## 2023-08-21 PROCEDURE — 1160F RVW MEDS BY RX/DR IN RCRD: CPT

## 2023-08-21 PROCEDURE — 1125F AMNT PAIN NOTED PAIN PRSNT: CPT

## 2023-08-21 PROCEDURE — 99214 OFFICE O/P EST MOD 30 MIN: CPT

## 2023-08-21 PROCEDURE — 1159F MED LIST DOCD IN RCRD: CPT

## 2023-08-21 NOTE — PROGRESS NOTES
CHIEF COMPLAINT  Back pain    Subjective   Yolanda Lee Hatchett is a 63 y.o. female  who presents to the office for follow-up of procedure.  She completed a Left L5 & S1 TF ADRIANA on 7/7/23 performed by Dr. Bauman for management of low back and left leg pain. Patient reports 80% relief  from the procedure x 3 days.    Today pain is 6/10VAS in severity. Pain is located in her low back and radiates into bilateral hips. When pain is severe, pain radiates down left lateral leg. Describes this pain as a nearly continuous aching and burning. Pain is worsened by prolonged standing or sitting, walking long distances, and increased physical activity. Pain improved with rest/reposition, heat/ice, and Meloxicam 15mg daily (reports minimal relief). Patient also self medicates with THC for pain relief.      Unable to tolerate Gabapentin - caused vertigo      Procedures:  7/7/23 - Left L5 & S1 TF ADRIANA - 80% relief x 3 days   5/15/23 - L5/S1 LESI - 100% relief x 3 weeks     Back Pain  This is a chronic problem. The current episode started more than 1 year ago. The problem occurs constantly. The problem has been gradually worsening since onset. The pain is present in the lumbar spine. The quality of the pain is described as aching and burning. The pain radiates to the left thigh (left lateral/posterior leg - pain radiates into left foot). The pain is at a severity of 8/10. The pain is moderate. The pain is The same all the time. The symptoms are aggravated by sitting, standing and position (walking long distances). Associated symptoms include chest pain, numbness (left leg) and weakness (left leg). Pertinent negatives include no abdominal pain, dysuria, fever or headaches.      PEG Assessment   What number best describes your pain on average in the past week?8  What number best describes how, during the past week, pain has interfered with your enjoyment of life?9  What number best describes how, during the past week, pain has interfered  with your general activity?  9    Review of Pertinent Medical Data ---  MRI LUMBAR SPINE WITH AND WITHOUT CONTRAST ON 03/24/2023     CLINICAL HISTORY: Patient has history of lung cancer and has back pain,  bilateral lower extremity numbness and weakness.     TECHNIQUE: Sagittal T1, proton density and fat-suppressed T2 and  postcontrast sagittal fat-suppressed T1-weighted images were obtained  lumbar spine. In addition thin cut axial T1-weighted images were  obtained angled through the interspaces from T11 to S1 axial T2 and  postcontrast axial T1-weighted images were obtained from T11 to S1.     This is correlated to prior post myelogram CT lumbar spine from [facility] on 02/12/2016.     FINDINGS: The distal thoracic cord and conus is normal in signal  intensity. The conus terminates at the level of the inferior body of L2  which is borderline but probably lower limits of normal.     At T12-L1 the disc space and facets are normal with no canal or  foraminal narrowing.     At L1-2 the disc space and facets are normal with no canal or foraminal  narrowing.     At L2 there is mild bilateral facet overgrowth, mild disc space  narrowing and degenerative endplate change, 2 mm retrolisthesis of L2 on  L3 and diffuse annular disc bulge and there is mild-to-moderate  narrowing of the thecal sac and there is mild bilateral foraminal  narrowing.     At L3-4 there is mild-to-moderate bilateral facet overgrowth. There is  disc space narrowing and degenerative endplate changes, 3 mm  retrolisthesis of L3 on L4, diffuse annular spurring and bulging disc  material. Prominent posterior epidural fat and a combination of all of  the above findings contributes to severe generalized narrowing of the  thecal sac diminished spinal fluid bathing the cauda equina at this  level and there is spurring bulging disc material extending into the  neural foramina with mild-to-moderate bilateral foraminal narrowing.     At L4-5  there is mild-to-moderate bilateral facet overgrowth. There is  severe disc space narrowing and there are degenerative endplate changes  diffuse posterior disc osteophyte complex effaces the ventral aspect of  thecal sac and there is prominent posterior epidural fat and there is  severe narrowing of the thecal sac and diminished spinal fluid bathing  the cauda equina at this level. There is some slight narrowing of the  lateral recesses posterior spurs abut the ventral aspect traversing L5  nerve roots. There is spurring into the inferior aspect of the neural  foramina bilaterally with mild-to-moderate right and there is moderate  left bony foraminal narrowing.     At L5-S1 there is mild bilateral facet overgrowth and there is severe  disc space narrowing and there are degenerative endplate changes, 5 mm  retrolisthesis of L5 with respect to S1 with uncovering posterior  inferior aspect of L5-S1 disc space and disc material bulging along the  posterior superior endplate of S1 eccentric right paracentrally where it  protrudes where there is some herniated disc material along the right  posterior superior endplate of S1. There is some narrowing of the thecal  sac. There is slight narrowing lateral recesses. There is spurring into  the foramina bilaterally and there is moderate bilateral bony foraminal  narrowing.     IMPRESSION:  1. The disc space and facets are normal with no canal or foraminal  narrowing at T12-L1 and L1-L2.  2. At L2-3 there is mild bilateral facet overgrowth, disc space  narrowing and degenerative endplate changes and a 3 mm retrolisthesis of  L2 with respect to L3 and diffuse annular disc bulge and posterior  epidural fat contribute to mild-to-moderate narrowing of the thecal sac  and there is mild bilateral foraminal narrowing.  3. At L3-4 there is mild-to-moderate bilateral facet overgrowth, disc  space narrowing and degenerative endplate change, 3 mm retrolisthesis of  L3 on L4 and there is a  diffuse posterior disc osteophyte complex and  prominent posterior epidural fat, the combination of which contributes  to severe narrowing of the thecal sac with diminished spinal fluid  bathing the cauda equina at this level. There is spurring bulging disc  material extending into the neural foramina resulting in  mild-to-moderate bilateral foraminal narrowing.  4. At L4-5 there is mild-to-moderate bilateral facet overgrowth and  there is severe disc space narrowing and degenerative endplate changes  and 2-3 mm retrolisthesis of L4 on L5 and prominent diffuse posterior  disc osteophyte complex and prominent posterior epidural fat and there  is severe narrowing of the thecal sac, diminished spinal fluid bathing  the cauda equina at this level, spurring into the foramina and there is  mild-to-moderate right and moderate left foraminal narrowing.  5. At L5-S1 there is mild bilateral facet overgrowth, disc space  narrowing and degenerative endplate changes 5 mm retrolisthesis of L5  with respect to S1 with uncovering of the posterior inferior aspect of  L5-S1 disc space and disc material diffusely bulging along the posterior  superior endplate of S1. There is eccentric herniated disc material  along the right paracentral superior body and endplate of S1 measuring 8  x 5 mm abuts the anteromedial aspect traversing right S1 nerve root but  does not have mass effect on it or displace the right S1 nerve root.  There is spurring into the foramina and there is moderate bilateral  foraminal narrowing.     This report was finalized on 3/24/2023 1:53 PM by Dr. Cody Mckeon M.D.     The following portions of the patient's history were reviewed and updated as appropriate: allergies, current medications, past family history, past medical history, past social history, past surgical history, and problem list.    Review of Systems   Constitutional:  Negative for activity change (less), fatigue and fever.   Respiratory:  Positive for  "cough. Negative for chest tightness.    Cardiovascular:  Positive for chest pain.   Gastrointestinal:  Negative for abdominal pain, constipation and diarrhea.   Genitourinary:  Negative for difficulty urinating and dysuria.   Musculoskeletal:  Positive for back pain.   Neurological:  Positive for weakness (left leg) and numbness (left leg). Negative for dizziness, light-headedness and headaches.   Psychiatric/Behavioral:  Positive for sleep disturbance. Negative for agitation and suicidal ideas. The patient is not nervous/anxious.      I have reviewed and confirmed the accuracy of the ROS as documented by the MA/LPN/RN NE Narvaez     Vitals:    08/21/23 1524   BP: 130/70   BP Location: Left arm   Patient Position: Sitting   Cuff Size: Adult   Pulse: 81   Resp: 12   Temp: 96 øF (35.6 øC)   TempSrc: Temporal   SpO2: 99%   Weight: 63.1 kg (139 lb 3.2 oz)   Height: 157.5 cm (62\")   PainSc:   8     Objective   Physical Exam  Constitutional:       Appearance: Normal appearance.   HENT:      Head: Normocephalic.   Cardiovascular:      Rate and Rhythm: Normal rate and regular rhythm.   Pulmonary:      Effort: Pulmonary effort is normal.      Breath sounds: Normal breath sounds.   Musculoskeletal:      Cervical back: Normal range of motion.      Lumbar back: Tenderness and bony tenderness present. No spasms. Decreased range of motion. Positive left straight leg raise test. Negative right straight leg raise test.      Comments: + lumbar facet loading/tenderness  - left SI joint tenderness  - left ENRICO's test   Skin:     General: Skin is warm and dry.      Capillary Refill: Capillary refill takes less than 2 seconds.   Neurological:      General: No focal deficit present.      Mental Status: She is alert and oriented to person, place, and time.   Psychiatric:         Mood and Affect: Mood normal.         Behavior: Behavior normal.         Thought Content: Thought content normal.         Cognition and Memory: " "Cognition normal.     Assessment & Plan   Diagnoses and all orders for this visit:    1. Lumbar radiculopathy (Primary)    2. Spinal stenosis of lumbar region with neurogenic claudication    3. Sacroiliac joint pain    4. Trochanteric bursitis of left hip    5. Chronic pain syndrome    --- Bilateral L4-S1 MBB x 2 with goal of RFA  -------  Education about Medial Branch Blockade and RF Therapy:    This medial branch blockade (MBB) suggested is intended for diagnostic purposes, with the intent of offering the patient Radiofrequency thermal rhizotomy (RF) if the MBB is diagnostically effective.  The diagnostic blockade is necessary to determine the likelihood that RF therapy could be efficacious in providing long term relief to the patient.    Medial branches are sensory nerve branches that connect to a facet joint and transmit sensations & pain signals from that joint.  Facet is a term for the type of joints found in the spine.  Medial branches are the nerves that go to a facet, and therefore are also sometimes called \"facet joint nerves\" (FJNs).      In a medial branch blockade procedure, xray fluoroscopy is used to verify the locations of the outside of the joint lines which are being targeted.  Under xray guidance, needles are placed to these areas.  Contrast dye is injected to confirm proper placement, with dye flowing over the joint area, and to ensure that the dye does not flow into unintended areas such as a vein.  When this is confirmed, local anesthetic is injected to block the medial branch at that joint level.      If MBBs are diagnostically successful in blocking pain, then the patient is most likely a great candidate for Radiofrequency of those facet joint nerves.  In the RF procedure, needles are placed to the joint lines in the same fashion, and after testing, the needle tips are heated to thermally treat the nerves, blocking the nerves by in essence damaging the nerves with the heat " treatment(non-pulsed).       Medically, a successful RF procedure should provide a patient with 50% pain relief or more for at least 6 months.  Clinical experience suggests that successful patients receive relief more in the range of 12 months on average.  We also discussed that a fortunate minority of patients receive therapeutic success from the MBB, and may not require RF ablation.  If a patient receives more than 8 weeks of relief from MBB, then occasional repeat MBB for therapeutic purposes is a very reasonable alternative therapy.  This course of therapy is consistent with our LCDs according to our CMS  in the area, and therefore other insurance providers should follow accordingly.  We will monitor our patients to screen for these therapeutic responders and will offer RF therapy only when necessary.      We discussed that MBB & RF are not without risks.  Guidelines regarding anticoagulant use & neuraxial procedures will be respected.  Patients that are ill or otherwise may be at risk for sepsis will not have their spines accessed by neuraxial injections of any type.  This patient will not be offered these therapies if there is an increased risk.   We discussed that there is a risk of postprocedural pain and also a risk of worsening of clinical picture with these procedures as with any neuraxial procedure.    -------  --- Offered referral to Neurosurgery - patient declined at this time.   --- Follow-up for procedure    Yolanda Lee Hatchett reports a pain score of 8.  Given her pain assessment as noted, treatment options were discussed and the following options were decided upon as a follow-up plan to address the patient's pain: patient scheduled for lumbar MBB x 2 with Dr. Bauman     Diagnostic Facet Joint Procedure:   MBB   The first diagnostic facet joint procedure is considered medically reasonable and necessary for the diagnosis and treatment of chronic pain for this patient due to the patient  meeting all of the following criteria:    1. Moderate to severe chronic neck or low back pain, predominantly axial, that causes functional deficit measured on pain or disability scale.  2. Pain present for minimum of 3 months with documented failure to respond to noninvasive conservative management (as tolerated)  3. Absence of untreated radiculopathy or neurogenic claudication (except for radiculopathy caused by facet joint synovial cyst)  4. There is no non-facet pathology per clinical assessment or radiology studies that could explain the source of the patient's pain, including but not limited to fracture, tumor, infection, or significant deformity.     DEEPTI REPORT  As the clinician, I personally reviewed the DEEPTI from 8/21/23 while the patient was in the office today.    Dictated utilizing Dragon dictation.

## 2023-09-07 ENCOUNTER — OFFICE VISIT (OUTPATIENT)
Dept: ONCOLOGY | Facility: CLINIC | Age: 63
End: 2023-09-07
Payer: MEDICAID

## 2023-09-07 ENCOUNTER — LAB (OUTPATIENT)
Dept: LAB | Facility: HOSPITAL | Age: 63
End: 2023-09-07
Payer: MEDICAID

## 2023-09-07 VITALS
RESPIRATION RATE: 16 BRPM | WEIGHT: 140.6 LBS | TEMPERATURE: 98.6 F | DIASTOLIC BLOOD PRESSURE: 92 MMHG | HEART RATE: 89 BPM | OXYGEN SATURATION: 98 % | BODY MASS INDEX: 25.88 KG/M2 | HEIGHT: 62 IN | SYSTOLIC BLOOD PRESSURE: 152 MMHG

## 2023-09-07 DIAGNOSIS — C34.11 MALIGNANT NEOPLASM OF UPPER LOBE OF RIGHT LUNG: ICD-10-CM

## 2023-09-07 DIAGNOSIS — C34.90 ADENOCARCINOMA OF LUNG, UNSPECIFIED LATERALITY: ICD-10-CM

## 2023-09-07 DIAGNOSIS — C34.90 ADENOCARCINOMA OF LUNG, UNSPECIFIED LATERALITY: Primary | ICD-10-CM

## 2023-09-07 LAB
ALBUMIN SERPL-MCNC: 4.2 G/DL (ref 3.5–5.2)
ALBUMIN/GLOB SERPL: 1.3 G/DL
ALP SERPL-CCNC: 86 U/L (ref 39–117)
ALT SERPL W P-5'-P-CCNC: 12 U/L (ref 1–33)
ANION GAP SERPL CALCULATED.3IONS-SCNC: 12.2 MMOL/L (ref 5–15)
AST SERPL-CCNC: 22 U/L (ref 1–32)
BASOPHILS # BLD AUTO: 0.03 10*3/MM3 (ref 0–0.2)
BASOPHILS NFR BLD AUTO: 0.4 % (ref 0–1.5)
BILIRUB SERPL-MCNC: 0.3 MG/DL (ref 0–1.2)
BUN SERPL-MCNC: 11 MG/DL (ref 8–23)
BUN/CREAT SERPL: 11.8 (ref 7–25)
CALCIUM SPEC-SCNC: 9.3 MG/DL (ref 8.6–10.5)
CHLORIDE SERPL-SCNC: 108 MMOL/L (ref 98–107)
CO2 SERPL-SCNC: 23.8 MMOL/L (ref 22–29)
CREAT SERPL-MCNC: 0.93 MG/DL (ref 0.6–1.1)
DEPRECATED RDW RBC AUTO: 42.3 FL (ref 37–54)
EGFRCR SERPLBLD CKD-EPI 2021: 69.6 ML/MIN/1.73
EOSINOPHIL # BLD AUTO: 0.28 10*3/MM3 (ref 0–0.4)
EOSINOPHIL NFR BLD AUTO: 4 % (ref 0.3–6.2)
ERYTHROCYTE [DISTWIDTH] IN BLOOD BY AUTOMATED COUNT: 13.8 % (ref 12.3–15.4)
GLOBULIN UR ELPH-MCNC: 3.3 GM/DL
GLUCOSE SERPL-MCNC: 96 MG/DL (ref 65–99)
HCT VFR BLD AUTO: 43.1 % (ref 34–46.6)
HGB BLD-MCNC: 14.1 G/DL (ref 12–15.9)
IMM GRANULOCYTES # BLD AUTO: 0.01 10*3/MM3 (ref 0–0.05)
IMM GRANULOCYTES NFR BLD AUTO: 0.1 % (ref 0–0.5)
LYMPHOCYTES # BLD AUTO: 3.3 10*3/MM3 (ref 0.7–3.1)
LYMPHOCYTES NFR BLD AUTO: 46.9 % (ref 19.6–45.3)
MCH RBC QN AUTO: 27.4 PG (ref 26.6–33)
MCHC RBC AUTO-ENTMCNC: 32.7 G/DL (ref 31.5–35.7)
MCV RBC AUTO: 83.9 FL (ref 79–97)
MONOCYTES # BLD AUTO: 0.34 10*3/MM3 (ref 0.1–0.9)
MONOCYTES NFR BLD AUTO: 4.8 % (ref 5–12)
NEUTROPHILS NFR BLD AUTO: 3.08 10*3/MM3 (ref 1.7–7)
NEUTROPHILS NFR BLD AUTO: 43.8 % (ref 42.7–76)
NRBC BLD AUTO-RTO: 0 /100 WBC (ref 0–0.2)
PLATELET # BLD AUTO: 269 10*3/MM3 (ref 140–450)
PMV BLD AUTO: 8.8 FL (ref 6–12)
POTASSIUM SERPL-SCNC: 3.5 MMOL/L (ref 3.5–5.2)
PROT SERPL-MCNC: 7.5 G/DL (ref 6–8.5)
RBC # BLD AUTO: 5.14 10*6/MM3 (ref 3.77–5.28)
SODIUM SERPL-SCNC: 144 MMOL/L (ref 136–145)
WBC NRBC COR # BLD: 7.04 10*3/MM3 (ref 3.4–10.8)

## 2023-09-07 PROCEDURE — 1125F AMNT PAIN NOTED PAIN PRSNT: CPT | Performed by: INTERNAL MEDICINE

## 2023-09-07 PROCEDURE — 99213 OFFICE O/P EST LOW 20 MIN: CPT | Performed by: INTERNAL MEDICINE

## 2023-09-07 PROCEDURE — 36415 COLL VENOUS BLD VENIPUNCTURE: CPT

## 2023-09-07 PROCEDURE — 85025 COMPLETE CBC W/AUTO DIFF WBC: CPT

## 2023-09-07 PROCEDURE — 80053 COMPREHEN METABOLIC PANEL: CPT

## 2023-09-07 NOTE — PROGRESS NOTES
Subjective .  Patient with increasing back pain, thyromegaly, studies planned.    REASONS FOR FOLLOWUP:    1. Nonsmall cell lung carcinoma, stage IIA (down staged from presumed stage IIIA). The patient received neoadjuvant chemotherapy with carboplatin/Alimta x2. Followup scans showed considerable response. The patient is status post right upper lobectomy and lymphadenectomy (discharged 03/07/2012). Per Thoracic Conference, additional two cycles of adjuvant carboplatin/Alimta was advised and initiated on 04/11/2012. She has since remained in remission.     2.  Chest x-ray February 2004, July 20 2020-left hilar nodule 10 mm reduced to 6 mm    3.  Low-dose CT scan without evidence of recurrent disease 5/17/2021    4.  Patient seen 5/9/2022 with bilateral hip pain, abdominal swelling    5.  Patient assessed by CT chest and pelvis at [facility] 5/24/2022, pulmonary nodule, bilateral adrenal nodules, follow-up scans in 3 months planned, smoking cessation planned    6.  Patient reassessed CT chest abdomen pelvis 11/1/2022-no interval change with metastatic disease in chest abdomen pelvis.  Patient proceeding to smoking cessation.    7.  Patient seen 2/23/2023, primary care assessment with ultrasound of thyroid, MRI lumbar spine.  Patient status post endoscopy with polyps removed.    8.  Patient seen 9/7/2023 with pain management continuing, restart of additional anti-inflammatory.  No evidence of metastatic disease            History of Present Illness    The patient is a 63 y.o. female with the above-mentioned history, who returns the office today for 3-month follow-up.  She remains in observation in regards to her malignancy.  She did undergo surveillance CT scan July 2019 with a left lower lobe nodule, 1.1 cm noted.  She then underwent PET scan 7/24/2019 with no metabolic activity identified.   The patient reports today she overall feels very well.  She does have some chronic shortness of breath on  exertion though recovers quickly with rest.  She has been exercising and changed her diet, therefore has lost some weight.  She continues to have a chronic cough with occasional productive clear sputum.  She is attempting to stop smoking, though continues to smoke 1 pack/day.  She was recently prescribed Chantix by pulmonology.  She denies any new pain.  The patient is next seen January 2, 2020.  Unfortunately she is no longer covered by insurance though she remains, understandably, concerned about a previously noted left lower lobe pulmonary nodule.  A follow-up scan was scheduled though she is not able to cover that currently financially.      Plans were made for reassessment and the patient is next seen February 12, 2020 with repeat chest x-ray showing again a 1 cm lung nodule projecting just lateral to the left hilum between the level of the posterior left eighth and ninth ribs.  Fortunately she has not further symptomatic at this point additionally.  A follow-up chest x-ray was requested and obtained July 20, 2020 fortunately demonstrating a reduction in the left hilar nodule from 10 mm now to 6 mm.  She continues to have ongoing back pain indicating that insurance coverage has lapsed.        We elected to obtain a follow-up chest x-ray the patient is seen March 1, 2021.  Fortunately she is feeling well and repeat chest x-ray February 22, 2021 shows pulmonary hyperexpansion.  The previously noted lung nodules not noted left lower lobe or other suspicious lesion.  We have discussed follow-up low-dose CT scanning.    The patient underwent low-dose CT scan 5/17/2021 showing stable fairly dense pulmonary nodule left lower lobe that was thought to be granulomatous.  There were no other findings that might be suspicious for malignancy.  Patient, fortunately is feeling fair except for chronic back pain.  This is being assessed by her primary care physician with additional films.    The patient is reassessed 11/7/2021.   Mammographic screening 10/11/2021 with a negative, chest x-ray was performed 11/6/2021 and is negative for new abnormalities.  Patient states that Dr. Wiley  MRI of the lumbar spine is reviewed result the patient's current low back pain    The patient is next seen, ever, 5/9/2022 indicating that she has been having increasing hip pain as well as abdominal swelling.  Her primary care had endeavor to have her undergo repeat scans that are not covered at [facility] and thus we will try to schedule as an outpatient at a [facility] facility.    Patient had her scans performed at [facility] CT abdomen pelvis 5/24/2022 demonstrating indeterminate 8 x 9 mm left lower lobe pulmonary nodule, indeterminate bilateral adrenal gland nodules measuring up to 1.4 cm.  There is no comparison studies done on these examinations.    The patient is seen 6/13/2022 discussed that she had previous scans 2015 showing a left adrenal adenoma but no abnormalities in the right adrenal gland.  Follow-up studies scheduled 3 months intervals [place].  Smoking cessation plan and pulmonary referral planned.    Patient seen by pulmonary medicine 6/17/2022-COPD with mild exacerbation noted, Chantix trial.    The patient is next assessed 11/9/2022 with CT chest abdomen pelvis reviewed showing no clear evidence of metastatic disease to the chest or pelvis, status post right upper lobectomy, 9 mm nodule superior segment left lower lobe without change, new onset metastatic disease in abdomen pelvis with 2 splenic cysts that are new, bilateral adrenal nodules without change.  The patient is seen formally 11/9/2022 fortunately feeling reasonably well though still having extremity pain that may be peripheral vascular disease and she is urged strongly to discontinue smoking altogether at this point.  She is proceeding through nicotine lozenges.  Fortunately her scans, as above, do not demonstrate progressive malignancy.  She does describe a  positive Cologuard exam however and has been urged to proceed to colonoscopy which is now being scheduled.    The patient is next assessed 2/23/2023 having issues with back pain leading to primary care to assess an MRI of the lumbar spine now pending as well as ultrasound of thyroid after further thyromegaly noted, normal TFTs.  Notably she proceeded through colonoscopy with 2 polyps removed 1/10/2023-tubular adenoma in ascending colon, hyperplastic polyp in the rectum.    Subsequent MRI testing 3/24/2023 demonstrated L2 with respect L3 diffuse annular disc bulge and posterior epidural fat contributing to moderate moderate narrowing of thecal sac, L3-L4 with mild to moderate bilateral facet overgrowth and diffuse posterior disc osteophyte complex.  Similar findings L4-5, L5 to/1 with moderate foraminal narrowing.  Patient felt to be candidate for epidural steroid injections and medial branch blockade now also anticipated.  There is, fortunately, no evidence of metastatic disease present though the patient is quite symptomatic per back pain.      Past Medical History:   Diagnosis Date    Asthma     Bleeding of blood vessel 01/05/2011    BRAIN    Carpal tunnel syndrome on right     COPD (chronic obstructive pulmonary disease)     MODERATE    DDD (degenerative disc disease), cervical 09/22/2014    MODERATE C6-C7, SEEING DR. EWING    Drug therapy     lung ca    Emphysema of lung     Fibroids     UTERINE X 2    Hx of radiation therapy     lung ca    Lung cancer     MI (myocardial infarction) 06/2009    Mitral insufficiency     Non-small cell carcinoma of lung     Stabe IIA    Ovarian cyst     RIGHT    Pain of right upper extremity 11/18/2015    Pelvic pain     Positive colorectal cancer screening using Cologuard test     Right shoulder pain     SOB (shortness of breath)     Thyroid nodule     Vertigo 05/13/2015    Weight loss        ONCOLOGIC HISTORY:  (History from previous dates can be found in the separate  document.)    Current Outpatient Medications on File Prior to Visit   Medication Sig Dispense Refill    albuterol sulfate HFA (Ventolin HFA) 108 (90 Base) MCG/ACT inhaler Inhale 2 puffs Every 4 (Four) Hours As Needed for Wheezing. 17 g 3    Anoro Ellipta 62.5-25 MCG/ACT aerosol powder  inhaler 1 puff Daily.      aspirin 81 MG EC tablet Take 1 tablet by mouth Daily.      meloxicam (MOBIC) 15 MG tablet Take 1 tablet by mouth Daily As Needed for Moderate Pain. Take with food. 60 tablet 1     No current facility-administered medications on file prior to visit.       ALLERGIES:     Allergies   Allergen Reactions    Codeine Irritability       Social History     Socioeconomic History    Marital status:     Years of education: College   Tobacco Use    Smoking status: Every Day     Packs/day: 0.50     Years: 50.00     Pack years: 25.00     Types: Cigarettes     Start date: 1972    Smokeless tobacco: Never    Tobacco comments:     11/28/22 - reports 3 cigs/day + 3 lozenges/day   Vaping Use    Vaping Use: Never used   Substance and Sexual Activity    Alcohol use: Yes     Comment: OCCASIONAL    Drug use: Yes     Types: Marijuana    Sexual activity: Not Currently     Partners: Male     Birth control/protection: Post-menopausal         Cancer-related family history includes Brain cancer in her father; Cancer (age of onset: 47) in her mother. There is no history of Breast cancer.      Review of Systems   Constitutional:  Negative for chills, fatigue and fever.   HENT:  Negative for mouth sores and sore throat.    Eyes:  Negative for visual disturbance.   Respiratory:  Negative for cough, chest tightness, shortness of breath (chronic unchanged) and wheezing.    Cardiovascular:  Negative for chest pain and leg swelling.   Gastrointestinal:  Negative for abdominal pain, constipation and vomiting.   Genitourinary:  Negative for dysuria and frequency.   Musculoskeletal:  Positive for back pain, joint swelling, myalgias and neck  "pain.   Neurological:  Positive for numbness. Negative for dizziness and weakness.   Hematological:  Does not bruise/bleed easily.   Psychiatric/Behavioral:  The patient is not nervous/anxious.    All other systems reviewed and are negative.Review of systems 09/18/2019  unchanged from previous office visit except as updated.       Objective      Vitals:    09/07/23 1622   BP: 152/92   Pulse: 89   Resp: 16   Temp: 98.6 °F (37 °C)   TempSrc: Temporal   SpO2: 98%   Weight: 63.8 kg (140 lb 9.6 oz)   Height: 157.5 cm (62.01\")   PainSc:   6   PainLoc: Generalized         9/7/2023     4:24 PM   Current Status   ECOG score 0       Physical Exam    GENERAL:  female alert and oriented.   SKIN: Warm, dry without rashes, purpura or petechiae.   HEAD: Normocephalic.   EYES: Pupils equal, round and reactive to light. EOMs intact. Conjunctivae normal.   EARS: Hearing intact.   NOSE: Septum midline. No excoriations or nasal discharge.   MOUTH: Tongue is well-papillated; no stomatitis or ulcers. Lips normal.   THROAT: Oropharynx without lesions or exudates.   NECK: Supple with good range of motion; progressive thyromegaly noted, no JVD or bruits.   LYMPHATICS: No cervical, supraclavicular adenopathy.   CHEST: Lungs clear to auscultation, prolonged expiratory phase noted bilaterally.   CARDIAC: Regular rate and rhythm without murmurs, rubs or gallops. Bowel sounds present.  ABDOMEN: Soft, nontender with no organomegaly or masses.   EXTREMITIES: No clubbing, cyanosis or edema.   NEUROLOGICAL: No focal neurological deficits. .      RECENT LABS:  Results from last 7 days   Lab Units 09/07/23  1617   WBC 10*3/mm3 7.04   NEUTROS ABS 10*3/mm3 3.08   HEMOGLOBIN g/dL 14.1   HEMATOCRIT % 43.1   PLATELETS 10*3/mm3 269      SCANNED - IMAGING (05/24/2022)      Assessment plan;        1. Nonsmall cell lung carcinoma, stage IIA (down staged from presumed stage IIIA).  Staging MRI of the brain negative. The patient received " neoadjuvant chemotherapy with carboplatin/Alimta x2. Followup scans showed considerable response. The patient is status post right upper lobectomy and lymphadenectomy (discharged 03/07/2012). Per Thoracic Conference, additional two cycles of adjuvant carboplatin/Alimta was advised and initiated on 04/11/2012. She has since remained in remission.    Low-dose screening CT of the chest July 2018 with a 1.1 left lower lobe nodule noted.  Follow-up PET scan the nodule was photopenic.  The patient has a follow-up review now in February 2020 after we have her apply for assistance with [facility].  A chest x-ray be requested in 5 weeks to see the physician in 6 weeks.  This proceeded with patient assessed February 12, 2020 with chest x-ray February 4  not significantly changed from previous.  At this point will reassess again at 6 months with MD, chest x-ray CBC and CMP.     This is reassessed September 30, 2020 with a left hilar nodule having dropped from 10 mm to 6 mm.  We will continue reassessment every 6 months.  The patient's follow-up chest x-ray February 22, 2021 is negative for abnormalities though low-dose CT scan is felt reasonable and be scheduled within the next 3 months.  She will be seen formally after the scan is done.  She agrees with this plan and follow-up.  The patient's follow-up low-dose CT chest done in follow-up 5/17/2021 was negative for recurrent disease.  There remains stable fairly dense pulmonary nodule left lower lobe thought to be a granuloma.  The patient had a follow-up chest x-ray 11/6/2021 without new abnormalities.  We discussed a repeat low-dose CT scan but she has an MRI possibly scheduled next several months of the lumbar spine.    Paced rhythm at 5/9/2022 with progressive symptoms of abdominal discomfort and swelling, her physical exam is not particularly abnormal but she is also having a little hip pain and has chronic issues with shortness of breath and cough as well as  smoking.    Patient underwent scans at [facility] with CT chest abdomen pelvis 5/24/2022 demonstrating indeterminate 8 x 9 mm left lower lobe pulmonary nodule, indeterminate bilateral adrenal gland nodules measuring up to 1.4 cm.  There is no comparison studies done on these examinations.    The patient is seen 6/13/2022 discussed that she had previous scans 2015 showing a left adrenal adenoma but no abnormalities in the right adrenal gland.  Follow-up studies scheduled 3 months intervals [place].  Smoking cessation plan and pulmonary referral planned.  Patient assessed by follow-up CT chest abdomen pelvis 10/12/2022 without evidence of recurrent disease.    Subsequent MRI testing 3/24/2023 demonstrated L2 with respect L3 diffuse annular disc bulge and posterior epidural fat contributing to moderate moderate narrowing of thecal sac, L3-L4 with mild to moderate bilateral facet overgrowth and diffuse posterior disc osteophyte complex.  Similar findings L4-5, L5 to/1 with moderate foraminal narrowing.  Patient felt to be candidate for epidural steroid injections and medial branch blockade now also anticipated.  There is, fortunately, no evidence of metastatic disease present though the patient is quite symptomatic per back pain.        2.  COPD.  Currently without symptoms of exacerbation.  She does see pulmonology and a follow-up will be scheduled.      3.  Smoking cessation.  Patient currently continuing with nicotine lozenges     4. Chronic pain. The patient is seen and managed by pain management.  Recent increase in back pain noted by primary care with subsequent MRI testing 3/24/2023 demonstrated L2 with respect L3 diffuse annular disc bulge and posterior epidural fat contributing to moderate moderate narrowing of thecal sac, L3-L4 with mild to moderate bilateral facet overgrowth and diffuse posterior disc osteophyte complex.  Similar findings L4-5, L5 to/1 with moderate foraminal narrowing.  Patient  felt to be candidate for epidural steroid injections and medial branch blockade now also anticipated.  There is, fortunately, no evidence of metastatic disease present though the patient is quite symptomatic per back pain.      5.  Recent positive findings for Cologuard, colonoscopy as above with polyps removed including ascending colon tubular adenoma and hyperplastic polyp in the rectum.    6.  Thyroid ultrasound 3/24/2023 thyroid nodules-1 year follow-up indicated.        Plan:    *Voltaren 50 mg p.o. 3 times daily E scribed to pharmacy as trial for pain control    *3 months follow-up MD CRESPO    *Pain management interventions now planned.

## 2023-09-08 ENCOUNTER — TRANSCRIBE ORDERS (OUTPATIENT)
Dept: SURGERY | Facility: SURGERY CENTER | Age: 63
End: 2023-09-08
Payer: MEDICAID

## 2023-09-08 DIAGNOSIS — Z41.9 SURGERY, ELECTIVE: Primary | ICD-10-CM

## 2023-09-08 PROBLEM — M47.816 LUMBAR FACET ARTHROPATHY: Status: ACTIVE | Noted: 2023-09-08

## 2023-09-15 ENCOUNTER — TRANSCRIBE ORDERS (OUTPATIENT)
Dept: SURGERY | Facility: SURGERY CENTER | Age: 63
End: 2023-09-15
Payer: MEDICAID

## 2023-09-15 DIAGNOSIS — Z41.9 SURGERY, ELECTIVE: Primary | ICD-10-CM

## 2023-09-29 ENCOUNTER — HOSPITAL ENCOUNTER (OUTPATIENT)
Facility: SURGERY CENTER | Age: 63
Setting detail: HOSPITAL OUTPATIENT SURGERY
Discharge: HOME | End: 2023-09-29
Attending: ANESTHESIOLOGY | Admitting: ANESTHESIOLOGY
Payer: MEDICAID

## 2023-09-29 ENCOUNTER — HOSPITAL ENCOUNTER (OUTPATIENT)
Dept: GENERAL RADIOLOGY | Facility: SURGERY CENTER | Age: 63
Setting detail: HOSPITAL OUTPATIENT SURGERY
End: 2023-09-29
Payer: MEDICAID

## 2023-09-29 VITALS
SYSTOLIC BLOOD PRESSURE: 165 MMHG | DIASTOLIC BLOOD PRESSURE: 94 MMHG | RESPIRATION RATE: 16 BRPM | HEART RATE: 69 BPM | TEMPERATURE: 98 F | OXYGEN SATURATION: 97 %

## 2023-09-29 DIAGNOSIS — Z41.9 SURGERY, ELECTIVE: ICD-10-CM

## 2023-09-29 DIAGNOSIS — M47.816 LUMBAR FACET ARTHROPATHY: ICD-10-CM

## 2023-09-29 PROCEDURE — 25010000002 BUPIVACAINE (PF) 0.25 % SOLUTION 10 ML VIAL: Performed by: ANESTHESIOLOGY

## 2023-09-29 PROCEDURE — 64494 INJ PARAVERT F JNT L/S 2 LEV: CPT | Performed by: ANESTHESIOLOGY

## 2023-09-29 PROCEDURE — 64493 INJ PARAVERT F JNT L/S 1 LEV: CPT | Performed by: ANESTHESIOLOGY

## 2023-09-29 PROCEDURE — 76000 FLUOROSCOPY <1 HR PHYS/QHP: CPT

## 2023-09-29 PROCEDURE — 77002 NEEDLE LOCALIZATION BY XRAY: CPT

## 2023-09-29 NOTE — OP NOTE
Lumbar Medial Branch Blockade at  Bilateral L4-S1  [facility]      PREOPERATIVE DIAGNOSIS:  Lumbar spondylosis without myelopathy    POSTOPERATIVE DIAGNOSIS:  Lumbar spondylosis without myelopathy    PROCEDURE:   Diagnostic Lumbar Medial Branch Nerve Blockades, with fluoroscopy:  at the L4, L5 transverse processes and the sacral alar groove)   86654-67 -- Lumbar Facet blocks, 1st Level  52483-94 -- Lumbar Facet blocks, 2nd  Level     PRE-PROCEDURE DISCUSSION WITH PATIENT:    Risks and complications were discussed with the patient prior to starting the procedure and informed consent was obtained.      SURGEON:  Jenny Bauman MD    REASON FOR PROCEDURE:    The patient complains of pain that seems to have a significant axial component and Painful area identified on exam under fluoroscopy    SEDATION:  No sedation was used for this procedure  ANESTHETIC:  0.25% bupivacaine  STEROID:  None  TOTAL VOLUME OF SOLUTION:  6ml    DESCRIPTON OF PROCEDURE:  After obtaining informed consent, IV access was not obtained in the preoperative area.   The patient was taken to the operating room.  The patient was placed in the prone position with a pillow under the abdomen. All pressure points were well padded.  EKG, blood pressure, and pulse oximeter were monitored.  The patient was monitored and sedated by the RN under my direction. The lumbosacral area was prepped with Chloraprep and draped in a sterile fashion.     AP fluoroscopic image was used to visualize the junction of the right S1 superior articular process with the sacral ala.  The skin and subcutaneous tissue over the area was anesthetized with 1% lidocaine.  A 22-gauge spinal needle was then advanced percutaneously through the anesthetized skin tract under fluoroscopic guidance in a coaxial view to contact periosteum.  After negative aspiration, a volume of 1 mL of the local anesthetic was injected without resistance.  The image was then optimized to  maximize visualization of the junctions of the L4, L5 superior articular processes with the transverse processes.  The skin and subcutaneous tissue over the areas was anesthetized with 1% lidocaine.  A 22-gauge spinal needle was then advanced percutaneously through the anesthetized skin tracts under fluoroscopic guidance in a coaxial view to contact periosteum at the target sites.  After negative aspiration, a volume of 1 mL of the local anesthetic  was injected without resistance at each of the target sites.      The same procedure was then performed on the contralateral side in the exact same fashion.        Onset of analgesia was noted.  Vital signs remained stable throughout.      ESTIMATED BLOOD LOSS:  <5 mL  SPECIMENS:  none    COMPLICATIONS:   No complications were noted.    TOLERANCE & DISCHARGE CONDITION:    The patient tolerated the procedure well.  The patient was transported to the recovery area without difficulties.  The patient was discharged to home under the care of family in stable and satisfactory condition.    Pre-procedure pain score: 0/10 at rest, 6/10 with activity  Post-procedure pain score: 0/10    PLAN OF CARE:  The patient was given our standard instruction sheet.  We discussed that Lumbar Medial Branch Blockade is a diagnostic procedure in consideration for radiofrequency ablation if two diagnostic procedures prove to be positive for significant benefit.  An alternative plan could be therapeutic lumbar branch blockades.  The patient is asked to keep an account of pain relief after the procedure today.  The patient will  Return to clinic 1-2 wks.  The patient will resume all medications as per the medication reconciliation sheet.

## 2023-09-29 NOTE — DISCHARGE INSTRUCTIONS
[facility] - Post-procedure Instructions          --  While there are no absolute restrictions, it is recommended that you do not perform strenuous activity today. In the morning, you may resume your level of activity as before your block.    --  If you have a band-aid at your injection site, please remove it later today. Observe the area for any redness, swelling, pus-like drainage, or a temperature over 101°. If any of these symptoms occur, please call your doctor at [phone]. If after office hours, leave a message and the on-call provider will return your call.    --  Ice may be applied to your injection site. It is recommended you avoid direct heat (heating pad; hot tub) for 1-2 days.    --  Call [facility] at [phone] if you experience persistent headache, persistent bleeding from the injection site, or severe pain not relieved by heat or oral medication.    --  Do not make important decisions today.    --  Due to the effects of the block and/or the I.V. Sedation, DO NOT drive or operate hazardous machinery for 12 hours.  Local anesthetics may cause numbness after procedure and precautions must be taken with regards to operating equipment as well as with walking, even if ambulating with assistance of another person or with an assistive device.    --  Do not drink alcohol for 12 hours.    -- You may return to work tomorrow, or as directed by your referring doctor.    --  Occasionally you may notice a slight increase in your pain after the procedure. This should start to improve within the next 24-48 hours. Radiofrequency ablation procedure pain may last 3-4 weeks.    --  It may take as long as 3-4 days before you notice a gradual improvement in your pain and/or other symptoms.    -- You may continue to take your prescribed pain medication as needed.    --  Some normal possible side effects of steroid use could include fluid retention, increased blood sugar, dull headache,  increased sweating, increased appetite, mood swings and flushing.    --  Diabetics are recommended to watch their blood glucose level closely for 24-48 hours after the injection.    --  Must stay in PACU for 20 min upon arrival and prove no leg weakness before being discharged.    --  IN THE EVENT OF A LIFE THREATENING EMERGENCY, (CHEST PAIN, BREATHING DIFFICULTIES, PARALYSIS…) YOU SHOULD GO TO YOUR NEAREST EMERGENCY ROOM.    --  You should be contacted by our office within 2-3 days to schedule follow up or next appointment date.  If not contacted within 7 days, please call the office at [phone]     If you have even 1 hour of relief, these injections are considered successful.

## 2023-09-29 NOTE — H&P
[facility]   HISTORY AND PHYSICAL    Patient Name: Yolanda Lee Hatchett  : 1960  MRN: [id number]  Primary Care Physician:  Jovany Wiley MD  Date of admission: 2023    Subjective   Subjective     Chief Complaint: Low back pain    History of Present Illness  Chronic axial low back pain    Review of Systems   Constitutional:  Negative for chills and fever.   Respiratory:  Negative for cough and shortness of breath.    Musculoskeletal:  Positive for back pain.      Personal History     Past Medical History:   Diagnosis Date    Asthma     Bleeding of blood vessel 2011    BRAIN    Carpal tunnel syndrome on right     COPD (chronic obstructive pulmonary disease)     MODERATE    DDD (degenerative disc disease), cervical 2014    MODERATE C6-C7, SEEING DR. EWING    Drug therapy     lung ca    Emphysema of lung     Fibroids     UTERINE X 2    Hx of radiation therapy     lung ca    Lung cancer     MI (myocardial infarction) 2009    Mitral insufficiency     Non-small cell carcinoma of lung     Stabe IIA    Ovarian cyst     RIGHT    Pain of right upper extremity 2015    Pelvic pain     Positive colorectal cancer screening using Cologuard test     Right shoulder pain     SOB (shortness of breath)     Thyroid nodule     Vertigo 2015    Weight loss        Past Surgical History:   Procedure Laterality Date    APPENDECTOMY      CATARACT EXTRACTION, BILATERAL  2019    COLONOSCOPY N/A 1/10/2023    Procedure: COLONOSCOPY TO CECUM AND TERMINAL ILEUM WITH COLD SNARE POLYPECTOMIES;  Surgeon: Maribell Sam MD;  Location: [facility] ENDOSCOPY;  Service: Gastroenterology;  Laterality: N/A;  PRE- POSITIVE COLOGUARD  POST- COLON POLYPS, HEMORRHOIDS    EPIDURAL Left 2023    Procedure: LEFT L5 & S1 TRANSFORAMINAL LUMBAR EPIDURAL STEROID INJECTION  CPT: 47361,50257;  Surgeon: Jenny Bauman MD;  Location: [facility] [place] MAIN OR;  Service: Pain Management;  Laterality: Left;    LAPAROSCOPIC TUBAL  LIGATION      LUMBAR EPIDURAL INJECTION N/A 5/15/2023    Procedure: LUMBAR EPIDURAL 1ST VISIT L5-S1 43603;  Surgeon: Jenny Bauman MD;  Location: [facility] [place] MAIN OR;  Service: Pain Management;  Laterality: N/A;    LUNG LOBECTOMY Right 2012    upper lobectomy and lymphadenectomy       Family History: family history includes Brain cancer in her father; Cancer (age of onset: 47) in her mother. Otherwise pertinent FHx was reviewed and not pertinent to current issue.    Social History:  reports that she has been smoking cigarettes. She started smoking about 51 years ago. She has a 25.00 pack-year smoking history. She has never used smokeless tobacco. She reports current alcohol use. She reports current drug use. Drug: Marijuana.    Home Medications:  Umeclidinium-Vilanterol, albuterol sulfate HFA, aspirin, diclofenac, and meloxicam    Allergies:  Allergies   Allergen Reactions    Codeine Irritability       Objective    Objective     Vitals:   Temp:  [97.4 °F (36.3 °C)] 97.4 °F (36.3 °C)  Heart Rate:  [64] 64  Resp:  [16] 16  BP: (160)/(95) 160/95    Physical Exam  Constitutional:       General: She is not in acute distress.  Pulmonary:      Effort: Pulmonary effort is normal. No respiratory distress.   Skin:     General: Skin is warm and dry.   Neurological:      Mental Status: She is alert.   Psychiatric:         Mood and Affect: Mood normal.         Thought Content: Thought content normal.       Result Review    Result Review:  I have personally reviewed the results from the time of this admission to 9/29/2023 11:00 EDT and agree with these findings:  []  Laboratory list / accordion  []  Microbiology  []  Radiology  []  EKG/Telemetry   []  Cardiology/Vascular   []  Pathology  []  Old records  []  Other:  Most notable findings include: No new       Assessment & Plan   Assessment / Plan     Brief Patient Summary:  Yolanda Lee Hatchett is a 63 y.o. female who has chronic axial low back pain.    Active Hospital  Problems:  Active Hospital Problems    Diagnosis     **Lumbar facet arthropathy      Plan: Lumbar medial branch blocks.      DVT prophylaxis:  No DVT prophylaxis order currently exists.    Jenny Bauman MD

## 2023-10-02 ENCOUNTER — OFFICE VISIT (OUTPATIENT)
Dept: PAIN MEDICINE | Facility: CLINIC | Age: 63
End: 2023-10-02
Payer: MEDICAID

## 2023-10-02 VITALS
TEMPERATURE: 96.8 F | WEIGHT: 141.6 LBS | DIASTOLIC BLOOD PRESSURE: 100 MMHG | SYSTOLIC BLOOD PRESSURE: 148 MMHG | HEART RATE: 82 BPM | OXYGEN SATURATION: 95 % | BODY MASS INDEX: 26.06 KG/M2 | HEIGHT: 62 IN

## 2023-10-02 DIAGNOSIS — M48.062 SPINAL STENOSIS OF LUMBAR REGION WITH NEUROGENIC CLAUDICATION: ICD-10-CM

## 2023-10-02 DIAGNOSIS — M54.16 LUMBAR RADICULOPATHY: Primary | ICD-10-CM

## 2023-10-02 DIAGNOSIS — G89.4 CHRONIC PAIN SYNDROME: ICD-10-CM

## 2023-10-02 DIAGNOSIS — M53.3 SACROILIAC JOINT PAIN: ICD-10-CM

## 2023-10-02 DIAGNOSIS — M70.62 TROCHANTERIC BURSITIS OF LEFT HIP: ICD-10-CM

## 2023-10-02 PROCEDURE — 1159F MED LIST DOCD IN RCRD: CPT

## 2023-10-02 PROCEDURE — 1125F AMNT PAIN NOTED PAIN PRSNT: CPT

## 2023-10-02 PROCEDURE — 1160F RVW MEDS BY RX/DR IN RCRD: CPT

## 2023-10-02 PROCEDURE — 99214 OFFICE O/P EST MOD 30 MIN: CPT

## 2023-10-02 NOTE — PROGRESS NOTES
CHIEF COMPLAINT  Back pain    Subjective   Yolanda Lee Hatchett is a 63 y.o. female  who presents to the office for follow-up of procedure.  She completed a Bilateral L4-S1 MBB on 9/25/23 performed by Dr. Bauman for management of back pain. Patient reports 80% relief from the procedure x 3 days. She reports that was she able to rest better and be more active following her procedure. Pain has been gradually worsening over the last 2 days.    Today pain is 6/10VAS in severity. Pain is located in her low back and radiates down left lateral/posterior thigh into foot. Describes this pain as a nearly continuous aching and burning. Pain is worsened by prolonged standing or sitting, walking long distances, and increased physical activity. Pain improved with rest/reposition, heat/ice, and Meloxicam 15mg daily. Patient also self medicates with THC for pain relief.      Unable to tolerate Gabapentin - caused vertigo      Procedures:  9/25/23 - Bilateral L4-S1 MBB - 80% relief x 3 days  5/15/23 - L5/S1 LESI - 100% relief x 3 weeks     Back Pain  This is a chronic problem. The current episode started more than 1 year ago. The problem occurs constantly. The problem has been waxing and waning since onset. The pain is present in the lumbar spine. The quality of the pain is described as aching and burning. The pain radiates to the left thigh (left lateral/posterior leg - pain radiates into left foot). The pain is at a severity of 6/10. The pain is moderate. The pain is The same all the time. The symptoms are aggravated by sitting, standing and position (walking long distances). Associated symptoms include numbness (left leg). Pertinent negatives include no abdominal pain, chest pain, dysuria, fever, headaches or weakness.      PEG Assessment   What number best describes your pain on average in the past week?6  What number best describes how, during the past week, pain has interfered with your enjoyment of life?10  What number best  describes how, during the past week, pain has interfered with your general activity?  6    Review of Pertinent Medical Data ---  MRI LUMBAR SPINE WITH AND WITHOUT CONTRAST ON 03/24/2023     CLINICAL HISTORY: Patient has history of lung cancer and has back pain,  bilateral lower extremity numbness and weakness.     TECHNIQUE: Sagittal T1, proton density and fat-suppressed T2 and  postcontrast sagittal fat-suppressed T1-weighted images were obtained  lumbar spine. In addition thin cut axial T1-weighted images were  obtained angled through the interspaces from T11 to S1 axial T2 and  postcontrast axial T1-weighted images were obtained from T11 to S1.     This is correlated to prior post myelogram CT lumbar spine from [facility] on 02/12/2016.     FINDINGS: The distal thoracic cord and conus is normal in signal  intensity. The conus terminates at the level of the inferior body of L2  which is borderline but probably lower limits of normal.     At T12-L1 the disc space and facets are normal with no canal or  foraminal narrowing.     At L1-2 the disc space and facets are normal with no canal or foraminal  narrowing.     At L2 there is mild bilateral facet overgrowth, mild disc space  narrowing and degenerative endplate change, 2 mm retrolisthesis of L2 on  L3 and diffuse annular disc bulge and there is mild-to-moderate  narrowing of the thecal sac and there is mild bilateral foraminal  narrowing.     At L3-4 there is mild-to-moderate bilateral facet overgrowth. There is  disc space narrowing and degenerative endplate changes, 3 mm  retrolisthesis of L3 on L4, diffuse annular spurring and bulging disc  material. Prominent posterior epidural fat and a combination of all of  the above findings contributes to severe generalized narrowing of the  thecal sac diminished spinal fluid bathing the cauda equina at this  level and there is spurring bulging disc material extending into the  neural foramina with  mild-to-moderate bilateral foraminal narrowing.     At L4-5 there is mild-to-moderate bilateral facet overgrowth. There is  severe disc space narrowing and there are degenerative endplate changes  diffuse posterior disc osteophyte complex effaces the ventral aspect of  thecal sac and there is prominent posterior epidural fat and there is  severe narrowing of the thecal sac and diminished spinal fluid bathing  the cauda equina at this level. There is some slight narrowing of the  lateral recesses posterior spurs abut the ventral aspect traversing L5  nerve roots. There is spurring into the inferior aspect of the neural  foramina bilaterally with mild-to-moderate right and there is moderate  left bony foraminal narrowing.     At L5-S1 there is mild bilateral facet overgrowth and there is severe  disc space narrowing and there are degenerative endplate changes, 5 mm  retrolisthesis of L5 with respect to S1 with uncovering posterior  inferior aspect of L5-S1 disc space and disc material bulging along the  posterior superior endplate of S1 eccentric right paracentrally where it  protrudes where there is some herniated disc material along the right  posterior superior endplate of S1. There is some narrowing of the thecal  sac. There is slight narrowing lateral recesses. There is spurring into  the foramina bilaterally and there is moderate bilateral bony foraminal  narrowing.     IMPRESSION:  1. The disc space and facets are normal with no canal or foraminal  narrowing at T12-L1 and L1-L2.  2. At L2-3 there is mild bilateral facet overgrowth, disc space  narrowing and degenerative endplate changes and a 3 mm retrolisthesis of  L2 with respect to L3 and diffuse annular disc bulge and posterior  epidural fat contribute to mild-to-moderate narrowing of the thecal sac  and there is mild bilateral foraminal narrowing.  3. At L3-4 there is mild-to-moderate bilateral facet overgrowth, disc  space narrowing and degenerative  endplate change, 3 mm retrolisthesis of  L3 on L4 and there is a diffuse posterior disc osteophyte complex and  prominent posterior epidural fat, the combination of which contributes  to severe narrowing of the thecal sac with diminished spinal fluid  bathing the cauda equina at this level. There is spurring bulging disc  material extending into the neural foramina resulting in  mild-to-moderate bilateral foraminal narrowing.  4. At L4-5 there is mild-to-moderate bilateral facet overgrowth and  there is severe disc space narrowing and degenerative endplate changes  and 2-3 mm retrolisthesis of L4 on L5 and prominent diffuse posterior  disc osteophyte complex and prominent posterior epidural fat and there  is severe narrowing of the thecal sac, diminished spinal fluid bathing  the cauda equina at this level, spurring into the foramina and there is  mild-to-moderate right and moderate left foraminal narrowing.  5. At L5-S1 there is mild bilateral facet overgrowth, disc space  narrowing and degenerative endplate changes 5 mm retrolisthesis of L5  with respect to S1 with uncovering of the posterior inferior aspect of  L5-S1 disc space and disc material diffusely bulging along the posterior  superior endplate of S1. There is eccentric herniated disc material  along the right paracentral superior body and endplate of S1 measuring 8  x 5 mm abuts the anteromedial aspect traversing right S1 nerve root but  does not have mass effect on it or displace the right S1 nerve root.  There is spurring into the foramina and there is moderate bilateral  foraminal narrowing.     This report was finalized on 3/24/2023 1:53 PM by Dr. Cody Mckeon M.D.    The following portions of the patient's history were reviewed and updated as appropriate: allergies, current medications, past family history, past medical history, past social history, past surgical history, and problem list.    Review of Systems   Constitutional:  Negative for chills,  "fatigue and fever.   Respiratory:  Negative for cough, chest tightness and shortness of breath.    Cardiovascular:  Negative for chest pain.   Gastrointestinal:  Negative for abdominal pain, constipation and diarrhea.   Genitourinary:  Negative for difficulty urinating and dysuria.   Musculoskeletal:  Positive for back pain.   Neurological:  Positive for numbness (left leg). Negative for dizziness, weakness, light-headedness and headaches.   Psychiatric/Behavioral:  Negative for sleep disturbance.      I have reviewed and confirmed the accuracy of the ROS as documented by the MA/LPN/RN EN Narvaez     Vitals:    10/02/23 1511   BP: 148/100   BP Location: Left arm   Patient Position: Sitting   Pulse: 82   Temp: 96.8 °F (36 °C)   TempSrc: Temporal   SpO2: 95%   Weight: 64.2 kg (141 lb 9.6 oz)   Height: 157.5 cm (62.01\")   PainSc:   6   PainLoc: Back     Objective   Physical Exam  Constitutional:       Appearance: Normal appearance.   HENT:      Head: Normocephalic.   Cardiovascular:      Rate and Rhythm: Normal rate and regular rhythm.   Pulmonary:      Effort: Pulmonary effort is normal.      Breath sounds: Normal breath sounds.   Musculoskeletal:      Cervical back: Normal range of motion.      Lumbar back: Tenderness and bony tenderness present. No spasms. Decreased range of motion. Positive left straight leg raise test. Negative right straight leg raise test.      Comments: + lumbar facet loading/tenderness  - left SI joint tenderness  - left ENRICO's test   Skin:     General: Skin is warm and dry.      Capillary Refill: Capillary refill takes less than 2 seconds.   Neurological:      General: No focal deficit present.      Mental Status: She is alert and oriented to person, place, and time.   Psychiatric:         Mood and Affect: Mood normal.         Behavior: Behavior normal.         Thought Content: Thought content normal.         Cognition and Memory: Cognition normal.     Assessment & Plan " "  Diagnoses and all orders for this visit:    1. Lumbar radiculopathy (Primary)    2. Spinal stenosis of lumbar region with neurogenic claudication    3. Sacroiliac joint pain    4. Trochanteric bursitis of left hip    5. Chronic pain syndrome    --- Proceed with Bilateral L4-S1 MBB - scheduled on 10/13/23 with Dr. Bauman  -------  Education about Medial Branch Blockade and RF Therapy:    This medial branch blockade (MBB) suggested is intended for diagnostic purposes, with the intent of offering the patient Radiofrequency thermal rhizotomy (RF) if the MBB is diagnostically effective.  The diagnostic blockade is necessary to determine the likelihood that RF therapy could be efficacious in providing long term relief to the patient.    Medial branches are sensory nerve branches that connect to a facet joint and transmit sensations & pain signals from that joint.  Facet is a term for the type of joints found in the spine.  Medial branches are the nerves that go to a facet, and therefore are also sometimes called \"facet joint nerves\" (FJNs).      In a medial branch blockade procedure, xray fluoroscopy is used to verify the locations of the outside of the joint lines which are being targeted.  Under xray guidance, needles are placed to these areas.  Contrast dye is injected to confirm proper placement, with dye flowing over the joint area, and to ensure that the dye does not flow into unintended areas such as a vein.  When this is confirmed, local anesthetic is injected to block the medial branch at that joint level.      If MBBs are diagnostically successful in blocking pain, then the patient is most likely a great candidate for Radiofrequency of those facet joint nerves.  In the RF procedure, needles are placed to the joint lines in the same fashion, and after testing, the needle tips are heated to thermally treat the nerves, blocking the nerves by in essence damaging the nerves with the heat treatment(non-pulsed).     "   Medically, a successful RF procedure should provide a patient with 50% pain relief or more for at least 6 months.  Clinical experience suggests that successful patients receive relief more in the range of 12 months on average.  We also discussed that a fortunate minority of patients receive therapeutic success from the MBB, and may not require RF ablation.  If a patient receives more than 8 weeks of relief from MBB, then occasional repeat MBB for therapeutic purposes is a very reasonable alternative therapy.  This course of therapy is consistent with our LCDs according to our CMS  in the area, and therefore other insurance providers should follow accordingly.  We will monitor our patients to screen for these therapeutic responders and will offer RF therapy only when necessary.      We discussed that MBB & RF are not without risks.  Guidelines regarding anticoagulant use & neuraxial procedures will be respected.  Patients that are ill or otherwise may be at risk for sepsis will not have their spines accessed by neuraxial injections of any type.  This patient will not be offered these therapies if there is an increased risk.   We discussed that there is a risk of postprocedural pain and also a risk of worsening of clinical picture with these procedures as with any neuraxial procedure.    -------  --- Follow-up for procedure - scheduled on 10/20/23    Yolanda Lee Hatchett reports a pain score of 6.  Given her pain assessment as noted, treatment options were discussed and the following options were decided upon as a follow-up plan to address the patient's pain: continuation of current treatment plan for pain and proceed with repeat Lumbar MBB.     Diagnostic Facet Joint Procedure:   MBB   The confirmatory diagnostic facet joint procedure is considered medically reasonable and necessary for the diagnosis and treatment of chronic pain for this patient due to the patient meeting all of the following  criteria:    1. Moderate to severe chronic neck or low back pain, predominantly axial, that causes functional deficit measured on pain or disability scale.  2. Pain present for minimum of 3 months with documented failure to respond to noninvasive conservative management (as tolerated)  3. Absence of untreated radiculopathy or neurogenic claudication (except for radiculopathy caused by facet joint synovial cyst)  4. There is no non-facet pathology per clinical assessment or radiology studies that could explain the source of the patient’s pain, including but not limited to fracture, tumor, infection, or significant deformity.    The patient has also shown a consistent positive response of at least 80% relief of primary (index) pain (with the duration of relief being consistent with the agent used).    DEEPTI REPORT  As the clinician, I personally reviewed the DEEPTI from 10/2/23 while the patient was in the office today.    Dictated utilizing Dragon dictation.

## 2023-11-16 ENCOUNTER — TELEPHONE (OUTPATIENT)
Dept: ONCOLOGY | Facility: CLINIC | Age: 63
End: 2023-11-16
Payer: MEDICAID

## 2023-11-16 NOTE — TELEPHONE ENCOUNTER
[facility] FOR [facility]     Left voicemail for patient to call back and provide insurance information. Please send me an [employer] message when they call back. Thank you

## 2023-11-16 NOTE — TELEPHONE ENCOUNTER
PATIENT CALLED BACK AND SAID SHE IS WITHOUT INSURANCE AND WON'T HAVE INSURANCE UNTIL THE 1ST OF THE YEAR AND NEEDS TO CANCEL APPTS FOR NOW.

## 2024-01-05 ENCOUNTER — OFFICE VISIT (OUTPATIENT)
Dept: FAMILY MEDICINE CLINIC | Facility: CLINIC | Age: 64
End: 2024-01-05
Payer: COMMERCIAL

## 2024-01-05 VITALS
SYSTOLIC BLOOD PRESSURE: 150 MMHG | BODY MASS INDEX: 25.03 KG/M2 | TEMPERATURE: 97.6 F | WEIGHT: 136 LBS | DIASTOLIC BLOOD PRESSURE: 80 MMHG | HEIGHT: 62 IN

## 2024-01-05 DIAGNOSIS — C34.92 ADENOCARCINOMA OF LEFT LUNG: ICD-10-CM

## 2024-01-05 DIAGNOSIS — R05.9 COUGH, UNSPECIFIED TYPE: Primary | ICD-10-CM

## 2024-01-05 DIAGNOSIS — G89.29 CHRONIC BILATERAL LOW BACK PAIN WITHOUT SCIATICA: ICD-10-CM

## 2024-01-05 DIAGNOSIS — R07.89 OTHER CHEST PAIN: ICD-10-CM

## 2024-01-05 DIAGNOSIS — M54.50 CHRONIC BILATERAL LOW BACK PAIN WITHOUT SCIATICA: ICD-10-CM

## 2024-01-05 DIAGNOSIS — M79.644 FINGER PAIN, RIGHT: ICD-10-CM

## 2024-01-05 LAB
ALBUMIN SERPL-MCNC: 4.5 G/DL (ref 3.5–5.2)
ALBUMIN/GLOB SERPL: 1.5 G/DL
ALP SERPL-CCNC: 83 U/L (ref 39–117)
ALT SERPL-CCNC: 26 U/L (ref 1–33)
AST SERPL-CCNC: 21 U/L (ref 1–32)
BASOPHILS # BLD AUTO: 0.02 10*3/MM3 (ref 0–0.2)
BASOPHILS NFR BLD AUTO: 0.4 % (ref 0–1.5)
BILIRUB SERPL-MCNC: 0.4 MG/DL (ref 0–1.2)
BUN SERPL-MCNC: 7 MG/DL (ref 8–23)
BUN/CREAT SERPL: 9.5 (ref 7–25)
CALCIUM SERPL-MCNC: 9.8 MG/DL (ref 8.6–10.5)
CHLORIDE SERPL-SCNC: 108 MMOL/L (ref 98–107)
CO2 SERPL-SCNC: 27.9 MMOL/L (ref 22–29)
CREAT SERPL-MCNC: 0.74 MG/DL (ref 0.57–1)
EGFRCR SERPLBLD CKD-EPI 2021: 91.6 ML/MIN/1.73
EOSINOPHIL # BLD AUTO: 0.14 10*3/MM3 (ref 0–0.4)
EOSINOPHIL NFR BLD AUTO: 3 % (ref 0.3–6.2)
ERYTHROCYTE [DISTWIDTH] IN BLOOD BY AUTOMATED COUNT: 13 % (ref 12.3–15.4)
EXPIRATION DATE: NORMAL
FLUAV AG UPPER RESP QL IA.RAPID: NOT DETECTED
FLUBV AG UPPER RESP QL IA.RAPID: NOT DETECTED
GLOBULIN SER CALC-MCNC: 3 GM/DL
GLUCOSE SERPL-MCNC: 86 MG/DL (ref 65–99)
HCT VFR BLD AUTO: 43.8 % (ref 34–46.6)
HGB BLD-MCNC: 14.5 G/DL (ref 12–15.9)
IMM GRANULOCYTES # BLD AUTO: 0 10*3/MM3 (ref 0–0.05)
IMM GRANULOCYTES NFR BLD AUTO: 0 % (ref 0–0.5)
INTERNAL CONTROL: NORMAL
LYMPHOCYTES # BLD AUTO: 2.11 10*3/MM3 (ref 0.7–3.1)
LYMPHOCYTES NFR BLD AUTO: 45.5 % (ref 19.6–45.3)
Lab: NORMAL
MCH RBC QN AUTO: 27.5 PG (ref 26.6–33)
MCHC RBC AUTO-ENTMCNC: 33.1 G/DL (ref 31.5–35.7)
MCV RBC AUTO: 83 FL (ref 79–97)
MONOCYTES # BLD AUTO: 0.49 10*3/MM3 (ref 0.1–0.9)
MONOCYTES NFR BLD AUTO: 10.6 % (ref 5–12)
NEUTROPHILS # BLD AUTO: 1.88 10*3/MM3 (ref 1.7–7)
NEUTROPHILS NFR BLD AUTO: 40.5 % (ref 42.7–76)
NRBC BLD AUTO-RTO: 0 /100 WBC (ref 0–0.2)
PLATELET # BLD AUTO: 273 10*3/MM3 (ref 140–450)
POTASSIUM SERPL-SCNC: 3.8 MMOL/L (ref 3.5–5.2)
PROT SERPL-MCNC: 7.5 G/DL (ref 6–8.5)
RBC # BLD AUTO: 5.28 10*6/MM3 (ref 3.77–5.28)
SARS-COV-2 AG UPPER RESP QL IA.RAPID: NOT DETECTED
SODIUM SERPL-SCNC: 146 MMOL/L (ref 136–145)
WBC # BLD AUTO: 4.64 10*3/MM3 (ref 3.4–10.8)

## 2024-01-05 PROCEDURE — 87428 SARSCOV & INF VIR A&B AG IA: CPT | Performed by: INTERNAL MEDICINE

## 2024-01-05 RX ORDER — FEXOFENADINE HCL 180 MG/1
180 TABLET ORAL DAILY
Qty: 30 TABLET | Refills: 2 | Status: SHIPPED | OUTPATIENT
Start: 2024-01-05

## 2024-01-05 NOTE — PROGRESS NOTES
01/05/2024    Assessment & Plan   This 63-year-old patient presents today after having been lost to follow-up.  Patient relates that she has had a runny nose but her tonsils are swollen.  She relates she has had a productive cough since taking Mucinex starting about 5 days ago.  She also relates that she had her right ring finger swells and goes down increases in size goes down during the day.  She denies any trauma she relates that she recently had to have a ring cut off because of the swelling this is the only finger that involves that being the right ring finger.  She also has questions regarding RSV.  She relates that she had some chest pain that reoccurred about 2 weeks or so ago she was concerned about that.  She relates she has had no more current since then it was sharp in nature and resolved after few seconds.  She relates she had a previous MI back in 2009.  I have explored her extensive records and see no evidence of any MI I did see that she presented to the emergency room in March 2009 but this was not a state that necessitated admission.  The chest pain is not with exertion.  There is no nausea or vomiting associated with it and it is substernal she related.    Her COVID-19 test was negative.  She has no pain with percussion over the maxillary sinuses.  Tympanic membrane was visualized good light reflex was seen there was mild amount of cerumen appreciated.    Right upper extremity was normal.  During the exam the patient also related that her thumb on the right side also increases in size and decreases during the day.  Note is made she has a past medical history of severe back pain and is currently enduring pain management.  She also has a prior history of non-small cell carcinoma of the lung and is being followed by oncology.  She is scheduled for reevaluation within the next few days.    Patient's EKG was unremarkable.  There was no evidence of any ischemia.      Diagnoses and all orders for this  visit:    1. Cough, unspecified type (Primary)  -     POCT SARS-CoV-2 + Flu Antigen DEVI    2. Adenocarcinoma of left lung    3. Chronic bilateral low back pain without sciatica    4. Other chest pain    Plan:  1.)  Allegra 180 mg 1 tab p.o. daily  2.)  Tylenol extra strength 1 tab p.o. Q8  3.)  Referral to hand surgeon for evaluation of right middle finger pain and swelling.  4.)  Comprehensive metabolic profile, CBC,  6.)  Follow-up in the next week or so to deal with other questions regarding ongoing care.  BMI is within normal parameters. No other follow-up for BMI required.           CC: URI (runny nose, throat hurts, tonsils are swollen, prod cough since starting Mucinex.  Symptoms started 5 days ago.) and Upper Extremity Issue (Right ring finger swelling.  Had to get a ring cut off because of the swelling.----no other issues)  .        HPI  URI     Upper Extremity Issue       Subjective   Yolanda Lee Hatchett is a 63 y.o. female.      The following portions of the patient's history were reviewed and updated as appropriate: allergies, current medications, past family history, past medical history, past social history, past surgical history, and problem list.    Problem List  Patient Active Problem List   Diagnosis    Lung cancer, upper lobe    COPD (chronic obstructive pulmonary disease)    Pulmonary nodule, left    Long-term current use of drug therapy for attention deficit hyperactivity disorder (ADHD)    Abnormal ECG    Adenocarcinoma of lung    Airway hyperreactivity    Arthritis    Chest pain    Cough    Degeneration of lumbosacral intervertebral disc    Herniation of intervertebral disc    Low back pain    Neck pain    Tobacco use disorder, moderate, dependence    Abdominal distension    Positive colorectal cancer screening using Cologuard test    Spinal stenosis of lumbar region with neurogenic claudication    Lumbar radiculopathy    Lumbar facet arthropathy       Past Medical History  Past Medical  History:   Diagnosis Date    Asthma     Bleeding of blood vessel 01/05/2011    BRAIN    Carpal tunnel syndrome on right     COPD (chronic obstructive pulmonary disease)     MODERATE    DDD (degenerative disc disease), cervical 09/22/2014    MODERATE C6-C7, SEEING DR. EWING    Drug therapy     lung ca    Emphysema of lung     Fibroids     UTERINE X 2    Hx of radiation therapy     lung ca    Lung cancer     MI (myocardial infarction) 06/2009    Mitral insufficiency     Non-small cell carcinoma of lung     Stabe IIA    Ovarian cyst     RIGHT    Pain of right upper extremity 11/18/2015    Pelvic pain     Positive colorectal cancer screening using Cologuard test     Right shoulder pain     SOB (shortness of breath)     Thyroid nodule     Vertigo 05/13/2015    Weight loss        Surgical History  Past Surgical History:   Procedure Laterality Date    APPENDECTOMY      CATARACT EXTRACTION, BILATERAL  03/2019    COLONOSCOPY N/A 1/10/2023    Procedure: COLONOSCOPY TO CECUM AND TERMINAL ILEUM WITH COLD SNARE POLYPECTOMIES;  Surgeon: Maribell Sam MD;  Location: [facility] ENDOSCOPY;  Service: Gastroenterology;  Laterality: N/A;  PRE- POSITIVE COLOGUARD  POST- COLON POLYPS, HEMORRHOIDS    EPIDURAL Left 7/7/2023    Procedure: LEFT L5 & S1 TRANSFORAMINAL LUMBAR EPIDURAL STEROID INJECTION  CPT: 62982,91151;  Surgeon: Jenny Bauman MD;  Location: [facility];  Service: Pain Management;  Laterality: Left;    LAPAROSCOPIC TUBAL LIGATION      LUMBAR EPIDURAL INJECTION N/A 5/15/2023    Procedure: LUMBAR EPIDURAL 1ST VISIT L5-S1 21712;  Surgeon: Jenny Bauman MD;  Location: [facility];  Service: Pain Management;  Laterality: N/A;    LUNG LOBECTOMY Right 2012    upper lobectomy and lymphadenectomy    MEDIAL BRANCH BLOCK Bilateral 9/29/2023    Procedure: BILATERAL L4-S1 LUMBAR MEDIAL BRANCH BLOCK CPT: 10959, 57448;  Surgeon: Jenny Bauman MD;  Location: [facility];  Service: Pain Management;  Laterality: Bilateral;        Family History  Family History   Problem Relation Age of Onset    Cancer Mother 47        Brain/lungs    Brain cancer Father     Breast cancer Neg Hx     Malig Hyperthermia Neg Hx        Social History  Social History    Tobacco Use      Smoking status: Every Day        Packs/day: 0.50        Years: 50.00        Additional pack years: 0.00        Total pack years: 25.00        Types: Cigarettes        Start date: 1972      Smokeless tobacco: Never      Tobacco comments: 11/28/22 - reports 3 cigs/day + 3 lozenges/day       Is the Patient a current tobacco user? No    Allergies  Allergies   Allergen Reactions    Codeine Irritability       Current Medications    Current Outpatient Medications:     albuterol sulfate HFA (Ventolin HFA) 108 (90 Base) MCG/ACT inhaler, Inhale 2 puffs Every 4 (Four) Hours As Needed for Wheezing., Disp: 17 g, Rfl: 3    Anoro Ellipta 62.5-25 MCG/ACT aerosol powder  inhaler, 1 puff As Needed., Disp: , Rfl:      Review of System  Review of Systems  I have reviewed and confirmed the accuracy of the ROS as documented by the MA/LPN/RN Jovany Wiley MD    Vitals:    01/05/24 0718   BP: 150/80   Temp: 97.6 °F (36.4 °C)     Body mass index is 24.87 kg/m².    Objective     Physical Exam  Physical Exam  Constitutional:       Appearance: Normal appearance.   HENT:      Head: Normocephalic and atraumatic.      Nose: Nose normal.   Eyes:      Extraocular Movements: Extraocular movements intact.      Pupils: Pupils are equal, round, and reactive to light.   Cardiovascular:      Rate and Rhythm: Normal rate and regular rhythm.   Pulmonary:      Effort: Pulmonary effort is normal.      Breath sounds: Normal breath sounds.   Musculoskeletal:      Cervical back: Normal range of motion and neck supple.   Neurological:      Mental Status: She is alert.         Jovany Wiley MD  01/05/2024

## 2024-01-14 NOTE — PROGRESS NOTES
Subjective .  Patient with increasing back pain  REASONS FOR FOLLOWUP:    1. Nonsmall cell lung carcinoma, stage IIA (down staged from presumed stage IIIA). The patient received neoadjuvant chemotherapy with carboplatin/Alimta x2. Followup scans showed considerable response. The patient is status post right upper lobectomy and lymphadenectomy (discharged 03/07/2012). Per Thoracic Conference, additional two cycles of adjuvant carboplatin/Alimta was advised and initiated on 04/11/2012. She has since remained in remission.     2.  Chest x-ray February 2004, July 20 2020-left hilar nodule 10 mm reduced to 6 mm    3.  Low-dose CT scan without evidence of recurrent disease 5/17/2021    4.  Patient seen 5/9/2022 with bilateral hip pain, abdominal swelling    5.  Patient assessed by CT chest and pelvis at [facility] 5/24/2022, pulmonary nodule, bilateral adrenal nodules, follow-up scans in 3 months planned, smoking cessation planned    6.  Patient reassessed CT chest abdomen pelvis 11/1/2022-no interval change with metastatic disease in chest abdomen pelvis.  Patient proceeding to smoking cessation.    7.  Patient seen 2/23/2023, primary care assessment with ultrasound of thyroid, MRI lumbar spine.  Patient status post endoscopy with polyps removed.    8.  Patient seen 9/7/2023 with pain management continuing, restart of additional anti-inflammatory.  No evidence of metastatic disease    9.  Insurance reinstated, rereferral back to pain management 1/15/2024            History of Present Illness    The patient is a 63 y.o. female with the above-mentioned history, who returns the office today for 3-month follow-up.  She remains in observation in regards to her malignancy.  She did undergo surveillance CT scan July 2019 with a left lower lobe nodule, 1.1 cm noted.  She then underwent PET scan 7/24/2019 with no metabolic activity identified.   The patient reports today she overall feels very well.  She does have  some chronic shortness of breath on exertion though recovers quickly with rest.  She has been exercising and changed her diet, therefore has lost some weight.  She continues to have a chronic cough with occasional productive clear sputum.  She is attempting to stop smoking, though continues to smoke 1 pack/day.  She was recently prescribed Chantix by pulmonology.  She denies any new pain.  The patient is next seen January 2, 2020.  Unfortunately she is no longer covered by insurance though she remains, understandably, concerned about a previously noted left lower lobe pulmonary nodule.  A follow-up scan was scheduled though she is not able to cover that currently financially.      Plans were made for reassessment and the patient is next seen February 12, 2020 with repeat chest x-ray showing again a 1 cm lung nodule projecting just lateral to the left hilum between the level of the posterior left eighth and ninth ribs.  Fortunately she has not further symptomatic at this point additionally.  A follow-up chest x-ray was requested and obtained July 20, 2020 fortunately demonstrating a reduction in the left hilar nodule from 10 mm now to 6 mm.  She continues to have ongoing back pain indicating that insurance coverage has lapsed.        We elected to obtain a follow-up chest x-ray the patient is seen March 1, 2021.  Fortunately she is feeling well and repeat chest x-ray February 22, 2021 shows pulmonary hyperexpansion.  The previously noted lung nodules not noted left lower lobe or other suspicious lesion.  We have discussed follow-up low-dose CT scanning.    The patient underwent low-dose CT scan 5/17/2021 showing stable fairly dense pulmonary nodule left lower lobe that was thought to be granulomatous.  There were no other findings that might be suspicious for malignancy.  Patient, fortunately is feeling fair except for chronic back pain.  This is being assessed by her primary care physician with additional  films.    The patient is reassessed 11/7/2021.  Mammographic screening 10/11/2021 with a negative, chest x-ray was performed 11/6/2021 and is negative for new abnormalities.  Patient states that Dr. Wiley  MRI of the lumbar spine is reviewed result the patient's current low back pain    The patient is next seen, ever, 5/9/2022 indicating that she has been having increasing hip pain as well as abdominal swelling.  Her primary care had endeavor to have her undergo repeat scans that are not covered at [facility] and thus we will try to schedule as an outpatient at a [facility] facility.    Patient had her scans performed at [facility] CT abdomen pelvis 5/24/2022 demonstrating indeterminate 8 x 9 mm left lower lobe pulmonary nodule, indeterminate bilateral adrenal gland nodules measuring up to 1.4 cm.  There is no comparison studies done on these examinations.    The patient is seen 6/13/2022 discussed that she had previous scans 2015 showing a left adrenal adenoma but no abnormalities in the right adrenal gland.  Follow-up studies scheduled 3 months intervals [place].  Smoking cessation plan and pulmonary referral planned.    Patient seen by pulmonary medicine 6/17/2022-COPD with mild exacerbation noted, Chantix trial.    The patient is next assessed 11/9/2022 with CT chest abdomen pelvis reviewed showing no clear evidence of metastatic disease to the chest or pelvis, status post right upper lobectomy, 9 mm nodule superior segment left lower lobe without change, new onset metastatic disease in abdomen pelvis with 2 splenic cysts that are new, bilateral adrenal nodules without change.  The patient is seen formally 11/9/2022 fortunately feeling reasonably well though still having extremity pain that may be peripheral vascular disease and she is urged strongly to discontinue smoking altogether at this point.  She is proceeding through nicotine lozenges.  Fortunately her scans, as above, do not demonstrate  progressive malignancy.  She does describe a positive Cologuard exam however and has been urged to proceed to colonoscopy which is now being scheduled.    The patient is next assessed 2/23/2023 having issues with back pain leading to primary care to assess an MRI of the lumbar spine now pending as well as ultrasound of thyroid after further thyromegaly noted, normal TFTs.  Notably she proceeded through colonoscopy with 2 polyps removed 1/10/2023-tubular adenoma in ascending colon, hyperplastic polyp in the rectum.    Subsequent MRI testing 3/24/2023 demonstrated L2 with respect L3 diffuse annular disc bulge and posterior epidural fat contributing to moderate moderate narrowing of thecal sac, L3-L4 with mild to moderate bilateral facet overgrowth and diffuse posterior disc osteophyte complex.  Similar findings L4-5, L5 to/1 with moderate foraminal narrowing.  Patient felt to be candidate for epidural steroid injections and medial branch blockade now also anticipated.  There is, fortunately, no evidence of metastatic disease present though the patient is quite symptomatic per back pain.    The patient did undergo therapy through pain management to modest improvement, loss of insurance just prior to her last trial.  Her insurance has now been reinstated when she is seen 1/15/2024 and her back pain persists.  We have discussed repeat pain management assessment at this point.          Past Medical History:   Diagnosis Date    Asthma     Bleeding of blood vessel 01/05/2011    BRAIN    Carpal tunnel syndrome on right     COPD (chronic obstructive pulmonary disease)     MODERATE    DDD (degenerative disc disease), cervical 09/22/2014    MODERATE C6-C7, SEEING DR. EWING    Drug therapy     lung ca    Emphysema of lung     Fibroids     UTERINE X 2    Hx of radiation therapy     lung ca    Lung cancer     MI (myocardial infarction) 06/2009    Mitral insufficiency     Non-small cell carcinoma of lung     Stabe IIA    Ovarian  cyst     RIGHT    Pain of right upper extremity 11/18/2015    Pelvic pain     Positive colorectal cancer screening using Cologuard test     Right shoulder pain     SOB (shortness of breath)     Thyroid nodule     Vertigo 05/13/2015    Weight loss        ONCOLOGIC HISTORY:  (History from previous dates can be found in the separate document.)    Current Outpatient Medications on File Prior to Visit   Medication Sig Dispense Refill    albuterol sulfate HFA (Ventolin HFA) 108 (90 Base) MCG/ACT inhaler Inhale 2 puffs Every 4 (Four) Hours As Needed for Wheezing. 17 g 3    Anoro Ellipta 62.5-25 MCG/ACT aerosol powder  inhaler 1 puff As Needed.      fexofenadine (Allegra Allergy) 180 MG tablet Take 1 tablet by mouth Daily. 30 tablet 2     No current facility-administered medications on file prior to visit.       ALLERGIES:     Allergies   Allergen Reactions    Codeine Irritability       Social History     Socioeconomic History    Marital status:     Years of education: College   Tobacco Use    Smoking status: Every Day     Packs/day: 0.50     Years: 50.00     Additional pack years: 0.00     Total pack years: 25.00     Types: Cigarettes     Start date: 1972    Smokeless tobacco: Never    Tobacco comments:     11/28/22 - reports 3 cigs/day + 3 lozenges/day   Vaping Use    Vaping Use: Never used   Substance and Sexual Activity    Alcohol use: Yes     Comment: OCCASIONAL    Drug use: Yes     Types: Marijuana    Sexual activity: Not Currently     Partners: Male     Birth control/protection: Post-menopausal         Cancer-related family history includes Brain cancer in her father; Cancer (age of onset: 47) in her mother. There is no history of Breast cancer.      Review of Systems   Constitutional:  Negative for chills, fatigue and fever.   HENT:  Negative for mouth sores and sore throat.    Eyes:  Negative for visual disturbance.   Respiratory:  Negative for cough, chest tightness, shortness of breath (chronic unchanged)  "and wheezing.    Cardiovascular:  Negative for chest pain and leg swelling.   Gastrointestinal:  Negative for abdominal pain, constipation and vomiting.   Genitourinary:  Negative for dysuria and frequency.   Musculoskeletal:  Positive for back pain, joint swelling, myalgias and neck pain.   Neurological:  Positive for numbness. Negative for dizziness and weakness.   Hematological:  Does not bruise/bleed easily.   Psychiatric/Behavioral:  The patient is not nervous/anxious.    All other systems reviewed and are negative.  Review of systems 09/18/2019  unchanged from previous office visit except as updated.       Objective      Vitals:    01/15/24 1458   BP: 168/85   Pulse: 69   Resp: 16   Temp: 98.2 °F (36.8 °C)   TempSrc: Temporal   SpO2: 98%   Weight: 63.8 kg (140 lb 11.2 oz)   Height: 157.5 cm (62.01\")   PainSc:   6   PainLoc: Back           1/15/2024     3:01 PM   Current Status   ECOG score 0       Physical Exam    GENERAL:  female alert and oriented.   SKIN: Warm, dry without rashes, purpura or petechiae.   HEAD: Normocephalic.   EYES: Pupils equal, round and reactive to light. EOMs intact. Conjunctivae normal.   EARS: Hearing intact.   NOSE: Septum midline. No excoriations or nasal discharge.   MOUTH: Tongue is well-papillated; no stomatitis or ulcers. Lips normal.   THROAT: Oropharynx without lesions or exudates.   NECK: Supple with good range of motion; progressive thyromegaly noted, no JVD or bruits.   LYMPHATICS: No cervical, supraclavicular adenopathy.   CHEST: Lungs clear to auscultation, prolonged expiratory phase noted bilaterally.   CARDIAC: Regular rate and rhythm without murmurs, rubs or gallops. Bowel sounds present.  ABDOMEN: Soft, nontender with no organomegaly or masses.   EXTREMITIES: No clubbing, cyanosis or edema.   NEUROLOGICAL: No focal neurological deficits. .      RECENT LABS:  Results from last 7 days   Lab Units 01/15/24  1449   WBC 10*3/mm3 7.81   NEUTROS ABS 10*3/mm3 " 4.00   HEMOGLOBIN g/dL 13.4   HEMATOCRIT % 39.2   PLATELETS 10*3/mm3 270        SCANNED - IMAGING (05/24/2022)      Assessment plan;        1. Nonsmall cell lung carcinoma, stage IIA (down staged from presumed stage IIIA).  Staging MRI of the brain negative. The patient received neoadjuvant chemotherapy with carboplatin/Alimta x2. Followup scans showed considerable response. The patient is status post right upper lobectomy and lymphadenectomy (discharged 03/07/2012). Per Thoracic Conference, additional two cycles of adjuvant carboplatin/Alimta was advised and initiated on 04/11/2012. She has since remained in remission.    Low-dose screening CT of the chest July 2018 with a 1.1 left lower lobe nodule noted.  Follow-up PET scan the nodule was photopenic.  The patient has a follow-up review now in February 2020 after we have [facility] [place]  A chest x-ray be requested in 5 weeks to see the physician in 6 weeks.  This proceeded with patient assessed February 12, 2020 with chest x-ray February 4  not significantly changed from previous.  At this point will reassess again at 6 months with MD, chest x-ray CBC and CMP.     This is reassessed September 30, 2020 with a left hilar nodule having dropped from 10 mm to 6 mm.  We will continue reassessment every 6 months.  The patient's follow-up chest x-ray February 22, 2021 is negative for abnormalities though low-dose CT scan is felt reasonable and be scheduled within the next 3 months.  She will be seen formally after the scan is done.  She agrees with this plan and follow-up.  The patient's follow-up low-dose CT chest done in follow-up 5/17/2021 was negative for recurrent disease.  There remains stable fairly dense pulmonary nodule left lower lobe thought to be a granuloma.  The patient had a follow-up chest x-ray 11/6/2021 without new abnormalities.  We discussed a repeat low-dose CT scan but she has an MRI possibly scheduled next several  months of the lumbar spine.    Paced rhythm at 5/9/2022 with progressive symptoms of abdominal discomfort and swelling, her physical exam is not particularly abnormal but she is also having a little hip pain and has chronic issues with shortness of breath and cough as well as smoking.    Patient underwent scans at [facility] with CT chest abdomen pelvis 5/24/2022 demonstrating indeterminate 8 x 9 mm left lower lobe pulmonary nodule, indeterminate bilateral adrenal gland nodules measuring up to 1.4 cm.  There is no comparison studies done on these examinations.    The patient is seen 6/13/2022 discussed that she had previous scans 2015 showing a left adrenal adenoma but no abnormalities in the right adrenal gland.  Follow-up studies scheduled 3 months intervals [place].  Smoking cessation plan and pulmonary referral planned.  Patient assessed by follow-up CT chest abdomen pelvis 10/12/2022 without evidence of recurrent disease.    Subsequent MRI testing 3/24/2023 demonstrated L2 with respect L3 diffuse annular disc bulge and posterior epidural fat contributing to moderate moderate narrowing of thecal sac, L3-L4 with mild to moderate bilateral facet overgrowth and diffuse posterior disc osteophyte complex.  Similar findings L4-5, L5 to/1 with moderate foraminal narrowing.  Patient felt to be candidate for epidural steroid injections and medial branch blockade now also anticipated.  There is, fortunately, no evidence of metastatic disease present though the patient is quite symptomatic per back pain.        2.  COPD.  Currently without symptoms of exacerbation.  She does see pulmonology and a follow-up will be scheduled.      3.  Smoking cessation.  Patient currently continuing with nicotine lozenges     4. Chronic pain. The patient is seen and managed by pain management.  Recent increase in back pain noted by primary care with subsequent MRI testing 3/24/2023 demonstrated L2 with respect L3 diffuse  annular disc bulge and posterior epidural fat contributing to moderate moderate narrowing of thecal sac, L3-L4 with mild to moderate bilateral facet overgrowth and diffuse posterior disc osteophyte complex.  Similar findings L4-5, L5 to/1 with moderate foraminal narrowing.  Patient felt to be candidate for epidural steroid injections and medial branch blockade now also anticipated.  There is, fortunately, no evidence of metastatic disease present though the patient is quite symptomatic per back pain.  The patient did undergo therapy through pain management to modest improvement, loss of insurance just prior to her last trial.  Her insurance has now been reinstated when she is seen 1/15/2024 and her back pain persists.  We have discussed repeat pain management assessment at this point.        5.  Recent positive findings for Cologuard, colonoscopy as above with polyps removed including ascending colon tubular adenoma and hyperplastic polyp in the rectum.    6.  Thyroid ultrasound 3/24/2023 thyroid nodules-1 year follow-up indicated.        Plan:    *Refer back to pain management    *6-week follow-up MD,    *Consider at that point potential repeat thyroid ultrasound

## 2024-01-15 ENCOUNTER — OFFICE VISIT (OUTPATIENT)
Dept: ONCOLOGY | Facility: CLINIC | Age: 64
End: 2024-01-15
Payer: COMMERCIAL

## 2024-01-15 ENCOUNTER — LAB (OUTPATIENT)
Dept: LAB | Facility: HOSPITAL | Age: 64
End: 2024-01-15
Payer: COMMERCIAL

## 2024-01-15 VITALS
DIASTOLIC BLOOD PRESSURE: 85 MMHG | BODY MASS INDEX: 25.89 KG/M2 | SYSTOLIC BLOOD PRESSURE: 168 MMHG | HEART RATE: 69 BPM | TEMPERATURE: 98.2 F | HEIGHT: 62 IN | RESPIRATION RATE: 16 BRPM | WEIGHT: 140.7 LBS | OXYGEN SATURATION: 98 %

## 2024-01-15 DIAGNOSIS — M48.062 SPINAL STENOSIS OF LUMBAR REGION WITH NEUROGENIC CLAUDICATION: Primary | ICD-10-CM

## 2024-01-15 DIAGNOSIS — C34.92 ADENOCARCINOMA OF LEFT LUNG: ICD-10-CM

## 2024-01-15 DIAGNOSIS — C34.90 ADENOCARCINOMA OF LUNG, UNSPECIFIED LATERALITY: ICD-10-CM

## 2024-01-15 LAB
BASOPHILS # BLD AUTO: 0.06 10*3/MM3 (ref 0–0.2)
BASOPHILS NFR BLD AUTO: 0.8 % (ref 0–1.5)
DEPRECATED RDW RBC AUTO: 40.6 FL (ref 37–54)
EOSINOPHIL # BLD AUTO: 0.2 10*3/MM3 (ref 0–0.4)
EOSINOPHIL NFR BLD AUTO: 2.6 % (ref 0.3–6.2)
ERYTHROCYTE [DISTWIDTH] IN BLOOD BY AUTOMATED COUNT: 13.7 % (ref 12.3–15.4)
HCT VFR BLD AUTO: 39.2 % (ref 34–46.6)
HGB BLD-MCNC: 13.4 G/DL (ref 12–15.9)
IMM GRANULOCYTES # BLD AUTO: 0.06 10*3/MM3 (ref 0–0.05)
IMM GRANULOCYTES NFR BLD AUTO: 0.8 % (ref 0–0.5)
LYMPHOCYTES # BLD AUTO: 2.98 10*3/MM3 (ref 0.7–3.1)
LYMPHOCYTES NFR BLD AUTO: 38.2 % (ref 19.6–45.3)
MCH RBC QN AUTO: 28 PG (ref 26.6–33)
MCHC RBC AUTO-ENTMCNC: 34.2 G/DL (ref 31.5–35.7)
MCV RBC AUTO: 82 FL (ref 79–97)
MONOCYTES # BLD AUTO: 0.51 10*3/MM3 (ref 0.1–0.9)
MONOCYTES NFR BLD AUTO: 6.5 % (ref 5–12)
NEUTROPHILS NFR BLD AUTO: 4 10*3/MM3 (ref 1.7–7)
NEUTROPHILS NFR BLD AUTO: 51.1 % (ref 42.7–76)
NRBC BLD AUTO-RTO: 0 /100 WBC (ref 0–0.2)
PLATELET # BLD AUTO: 270 10*3/MM3 (ref 140–450)
PMV BLD AUTO: 10.1 FL (ref 6–12)
RBC # BLD AUTO: 4.78 10*6/MM3 (ref 3.77–5.28)
WBC NRBC COR # BLD AUTO: 7.81 10*3/MM3 (ref 3.4–10.8)

## 2024-01-15 PROCEDURE — 99213 OFFICE O/P EST LOW 20 MIN: CPT | Performed by: INTERNAL MEDICINE

## 2024-01-15 PROCEDURE — 85025 COMPLETE CBC W/AUTO DIFF WBC: CPT

## 2024-01-15 PROCEDURE — 36415 COLL VENOUS BLD VENIPUNCTURE: CPT

## 2024-01-17 ENCOUNTER — OFFICE VISIT (OUTPATIENT)
Dept: OBSTETRICS AND GYNECOLOGY | Age: 64
End: 2024-01-17
Payer: COMMERCIAL

## 2024-01-17 VITALS
DIASTOLIC BLOOD PRESSURE: 76 MMHG | SYSTOLIC BLOOD PRESSURE: 136 MMHG | HEIGHT: 62 IN | WEIGHT: 137 LBS | BODY MASS INDEX: 25.21 KG/M2

## 2024-01-17 DIAGNOSIS — Z12.31 BREAST CANCER SCREENING BY MAMMOGRAM: ICD-10-CM

## 2024-01-17 DIAGNOSIS — R10.2 PELVIC PAIN: Primary | ICD-10-CM

## 2024-01-17 NOTE — PROGRESS NOTES
"Subjective     Chief Complaint   Patient presents with    Pelvic Pain     C/o pelvic pain, ultrasound done today.        Yolanda Lee Hatchett is a 63 y.o.  whose LMP is No LMP recorded (lmp unknown). Patient is postmenopausal. presents with left lower quadrant pain    Onset a few years ago  Has had colonoscopy that was normal  Denies changes to her bladder or bowel issues    Pain is not always present  Sitting in a tub helps  Doesn't notice anything that makes it worse   Does try ibuprofen periodically    No Additional Complaints Reported    The following portions of the patient's history were reviewed and updated as appropriate:no additional history reviewed, vital signs, allergies, current medications, past medical history, past social history, past surgical history, and problem list      Review of Systems   Genitourinary:positive for pelvic pain     Objective      /76   Ht 157.5 cm (62\")   Wt 62.1 kg (137 lb)   LMP  (LMP Unknown)   BMI 25.06 kg/m²     Physical Exam    General:   alert, comfortable, and no distress   Heart: Not performed today   Lungs: Not performed today.   Breast: Not performed today   Neck: Not performed today   Abdomen: Not performed today   CVA: Not performed today   Pelvis: Not performed today   Extremities: Not performed today   Neurologic: negative   Psychiatric: Normal affect, judgement, and mood       Lab Review   Labs: No data reviewed    Imaging   Ultrasound - Pelvic Vaginal  1.  Uterus: Small/atrophic , with uterine volume of 34 ml, and with uterine dimensions 5 x 5 x 2 cm     2.  Endometrium: Normal postmenopausal thickness, 3 mm , and small calcification seen near LEWIS     3.  Myometrium: Normal homogenous texture      4.  Ovaries             Left: Not visualized              Right: Normal/unremarkable , with ovarian volume: .69 ml , and Atrophic   Assessment & Plan     ASSESSMENT  1. Pelvic pain    2. Breast cancer screening by mammogram          PLAN  1.   Orders " Placed This Encounter   Procedures    Mammo Screening Digital Tomosynthesis Bilateral With CAD       2. No obvious findings that would account or the LLQ pain. It has been persistent for a few years so possibly GI related. However, she has a personal h/o lung cancer so full imaging would be beneficial. She will d/w her PCP    Follow up: AMY Gerard  1/17/2024

## 2024-01-23 ENCOUNTER — OFFICE VISIT (OUTPATIENT)
Dept: PAIN MEDICINE | Facility: CLINIC | Age: 64
End: 2024-01-23
Payer: COMMERCIAL

## 2024-01-23 ENCOUNTER — PREP FOR SURGERY (OUTPATIENT)
Dept: SURGERY | Facility: SURGERY CENTER | Age: 64
End: 2024-01-23
Payer: COMMERCIAL

## 2024-01-23 VITALS
DIASTOLIC BLOOD PRESSURE: 74 MMHG | WEIGHT: 140.6 LBS | HEIGHT: 62 IN | OXYGEN SATURATION: 95 % | HEART RATE: 78 BPM | BODY MASS INDEX: 25.88 KG/M2 | TEMPERATURE: 97.5 F | SYSTOLIC BLOOD PRESSURE: 130 MMHG

## 2024-01-23 DIAGNOSIS — G89.29 CHRONIC BILATERAL LOW BACK PAIN WITHOUT SCIATICA: ICD-10-CM

## 2024-01-23 DIAGNOSIS — M53.3 SACROILIAC JOINT PAIN: ICD-10-CM

## 2024-01-23 DIAGNOSIS — M54.50 CHRONIC BILATERAL LOW BACK PAIN WITHOUT SCIATICA: ICD-10-CM

## 2024-01-23 DIAGNOSIS — M47.816 LUMBAR FACET ARTHROPATHY: ICD-10-CM

## 2024-01-23 DIAGNOSIS — M47.816 LUMBAR FACET ARTHROPATHY: Primary | ICD-10-CM

## 2024-01-23 DIAGNOSIS — M48.062 SPINAL STENOSIS OF LUMBAR REGION WITH NEUROGENIC CLAUDICATION: Primary | ICD-10-CM

## 2024-01-23 PROCEDURE — 99214 OFFICE O/P EST MOD 30 MIN: CPT

## 2024-01-23 RX ORDER — CELECOXIB 200 MG/1
200 CAPSULE ORAL DAILY
Qty: 30 CAPSULE | Refills: 0 | Status: SHIPPED | OUTPATIENT
Start: 2024-01-23

## 2024-01-23 RX ORDER — DIAZEPAM 5 MG/1
10 TABLET ORAL ONCE
OUTPATIENT
Start: 2024-01-23

## 2024-01-23 NOTE — PROGRESS NOTES
CHIEF COMPLAINT  Back pain    Subjective   Yolanda Lee Hatchett is a 63 y.o. female  who presents for follow-up.  She has a history of low back pain. She reports that her pain has worsened since her last office visit.    Today pain is 7/10VAS in severity. Pain is located in her low back and radiates down left lateral/posterior thigh into foot. Describes this pain as a nearly continuous aching and burning. Pain is worsened by prolonged standing or sitting, walking long distances, and increased physical activity. Pain improved with rest/reposition, and heat/ice. Patient also self medicates with THC for pain relief. She was prescribed Meloxicam in the past but reports minimal relief.      Unable to tolerate Gabapentin - caused vertigo     History of nonsmall cell lung carcinoma - currently in remission      Procedures:  9/25/23 - Bilateral L4-S1 MBB - 80% relief x 3 days  5/15/23 - L5/S1 LESI - 100% relief x 3 weeks     Back Pain  This is a chronic problem. The current episode started more than 1 year ago. The problem occurs constantly. The problem has been worse since onset. The pain is present in the lumbar spine. The quality of the pain is described as aching and burning. The pain radiates to the left thigh (left lateral/posterior leg - pain radiates into left foot). The pain is at a severity of 7/10. The pain is moderate. The pain is The same all the time. The symptoms are aggravated by sitting, standing and position (walking long distances). Associated symptoms include numbness (tingling in left leg). Pertinent negatives include no abdominal pain, chest pain, dysuria, fever, headaches or weakness.      PEG Assessment   What number best describes your pain on average in the past week?7  What number best describes how, during the past week, pain has interfered with your enjoyment of life?10  What number best describes how, during the past week, pain has interfered with your general activity?  10    Review of  Pertinent Medical Data ---  MRI LUMBAR SPINE WITH AND WITHOUT CONTRAST ON 03/24/2023     CLINICAL HISTORY: Patient has history of lung cancer and has back pain,  bilateral lower extremity numbness and weakness.     TECHNIQUE: Sagittal T1, proton density and fat-suppressed T2 and  postcontrast sagittal fat-suppressed T1-weighted images were obtained  lumbar spine. In addition thin cut axial T1-weighted images were  obtained angled through the interspaces from T11 to S1 axial T2 and  postcontrast axial T1-weighted images were obtained from T11 to S1.     This is correlated to prior post myelogram CT lumbar spine from [facility] on 02/12/2016.     FINDINGS: The distal thoracic cord and conus is normal in signal  intensity. The conus terminates at the level of the inferior body of L2  which is borderline but probably lower limits of normal.     At T12-L1 the disc space and facets are normal with no canal or  foraminal narrowing.     At L1-2 the disc space and facets are normal with no canal or foraminal  narrowing.     At L2 there is mild bilateral facet overgrowth, mild disc space  narrowing and degenerative endplate change, 2 mm retrolisthesis of L2 on  L3 and diffuse annular disc bulge and there is mild-to-moderate  narrowing of the thecal sac and there is mild bilateral foraminal  narrowing.     At L3-4 there is mild-to-moderate bilateral facet overgrowth. There is  disc space narrowing and degenerative endplate changes, 3 mm  retrolisthesis of L3 on L4, diffuse annular spurring and bulging disc  material. Prominent posterior epidural fat and a combination of all of  the above findings contributes to severe generalized narrowing of the  thecal sac diminished spinal fluid bathing the cauda equina at this  level and there is spurring bulging disc material extending into the  neural foramina with mild-to-moderate bilateral foraminal narrowing.     At L4-5 there is mild-to-moderate bilateral facet  overgrowth. There is  severe disc space narrowing and there are degenerative endplate changes  diffuse posterior disc osteophyte complex effaces the ventral aspect of  thecal sac and there is prominent posterior epidural fat and there is  severe narrowing of the thecal sac and diminished spinal fluid bathing  the cauda equina at this level. There is some slight narrowing of the  lateral recesses posterior spurs abut the ventral aspect traversing L5  nerve roots. There is spurring into the inferior aspect of the neural  foramina bilaterally with mild-to-moderate right and there is moderate  left bony foraminal narrowing.     At L5-S1 there is mild bilateral facet overgrowth and there is severe  disc space narrowing and there are degenerative endplate changes, 5 mm  retrolisthesis of L5 with respect to S1 with uncovering posterior  inferior aspect of L5-S1 disc space and disc material bulging along the  posterior superior endplate of S1 eccentric right paracentrally where it  protrudes where there is some herniated disc material along the right  posterior superior endplate of S1. There is some narrowing of the thecal  sac. There is slight narrowing lateral recesses. There is spurring into  the foramina bilaterally and there is moderate bilateral bony foraminal  narrowing.     IMPRESSION:  1. The disc space and facets are normal with no canal or foraminal  narrowing at T12-L1 and L1-L2.  2. At L2-3 there is mild bilateral facet overgrowth, disc space  narrowing and degenerative endplate changes and a 3 mm retrolisthesis of  L2 with respect to L3 and diffuse annular disc bulge and posterior  epidural fat contribute to mild-to-moderate narrowing of the thecal sac  and there is mild bilateral foraminal narrowing.  3. At L3-4 there is mild-to-moderate bilateral facet overgrowth, disc  space narrowing and degenerative endplate change, 3 mm retrolisthesis of  L3 on L4 and there is a diffuse posterior disc osteophyte complex  and  prominent posterior epidural fat, the combination of which contributes  to severe narrowing of the thecal sac with diminished spinal fluid  bathing the cauda equina at this level. There is spurring bulging disc  material extending into the neural foramina resulting in  mild-to-moderate bilateral foraminal narrowing.  4. At L4-5 there is mild-to-moderate bilateral facet overgrowth and  there is severe disc space narrowing and degenerative endplate changes  and 2-3 mm retrolisthesis of L4 on L5 and prominent diffuse posterior  disc osteophyte complex and prominent posterior epidural fat and there  is severe narrowing of the thecal sac, diminished spinal fluid bathing  the cauda equina at this level, spurring into the foramina and there is  mild-to-moderate right and moderate left foraminal narrowing.  5. At L5-S1 there is mild bilateral facet overgrowth, disc space  narrowing and degenerative endplate changes 5 mm retrolisthesis of L5  with respect to S1 with uncovering of the posterior inferior aspect of  L5-S1 disc space and disc material diffusely bulging along the posterior  superior endplate of S1. There is eccentric herniated disc material  along the right paracentral superior body and endplate of S1 measuring 8  x 5 mm abuts the anteromedial aspect traversing right S1 nerve root but  does not have mass effect on it or displace the right S1 nerve root.  There is spurring into the foramina and there is moderate bilateral  foraminal narrowing.     This report was finalized on 3/24/2023 1:53 PM by Dr. Cody Mckeon M.D.     The following portions of the patient's history were reviewed and updated as appropriate: allergies, current medications, past family history, past medical history, past social history, past surgical history, and problem list.    Review of Systems   Constitutional:  Negative for chills, fatigue and fever.   Respiratory:  Negative for cough and shortness of breath.    Cardiovascular:   "Negative for chest pain.   Gastrointestinal:  Negative for abdominal pain, constipation and diarrhea.   Genitourinary:  Negative for difficulty urinating and dysuria.   Musculoskeletal:  Positive for back pain.   Neurological:  Positive for numbness (tingling in left leg). Negative for dizziness, weakness, light-headedness and headaches.   Psychiatric/Behavioral:  Negative for sleep disturbance.      I have reviewed and confirmed the accuracy of the ROS as documented by the MA/LPN/RN NE Narvaez    Vitals:    01/23/24 1514   BP: 130/74   BP Location: Left arm   Patient Position: Sitting   Pulse: 78   Temp: 97.5 °F (36.4 °C)   TempSrc: Temporal   SpO2: 95%   Weight: 63.8 kg (140 lb 9.6 oz)   Height: 157.5 cm (62\")   PainSc:   7   PainLoc: Back     Objective   Physical Exam  Constitutional:       Appearance: Normal appearance.   HENT:      Head: Normocephalic.   Cardiovascular:      Rate and Rhythm: Normal rate and regular rhythm.   Pulmonary:      Effort: Pulmonary effort is normal.      Breath sounds: Normal breath sounds.   Musculoskeletal:      Cervical back: Normal range of motion.      Lumbar back: Tenderness and bony tenderness present. No spasms. Decreased range of motion. Positive left straight leg raise test. Negative right straight leg raise test.      Comments: + lumbar facet loading/tenderness  - left SI joint tenderness  - left ENRICO's test   Skin:     General: Skin is warm and dry.      Capillary Refill: Capillary refill takes less than 2 seconds.   Neurological:      General: No focal deficit present.      Mental Status: She is alert and oriented to person, place, and time.   Psychiatric:         Mood and Affect: Mood normal.         Behavior: Behavior normal.         Thought Content: Thought content normal.         Cognition and Memory: Cognition normal.       Assessment & Plan   Diagnoses and all orders for this visit:    1. Spinal stenosis of lumbar region with neurogenic claudication " "(Primary)    2. Chronic bilateral low back pain without sciatica  -     celecoxib (CeleBREX) 200 MG capsule; Take 1 capsule by mouth Daily.  Dispense: 30 capsule; Refill: 0    3. Sacroiliac joint pain    4. Lumbar facet arthropathy      Yolanda Lee Hatchett reports a pain score of 7.  Given her pain assessment as noted, treatment options were discussed and the following options were decided upon as a follow-up plan to address the patient's pain: continuation of current treatment plan for pain and prescription for non-opiod analgesics.    --- Repeat Bilateral L4-S1 MBB - procedure was previously scheduled but had to be canceled due to insurance change.  -------  Education about Medial Branch Blockade and RF Therapy:  This medial branch blockade (MBB) suggested is intended for diagnostic purposes, with the intent of offering the patient Radiofrequency thermal rhizotomy (RF) if the MBB is diagnostically effective.  The diagnostic blockade is necessary to determine the likelihood that RF therapy could be efficacious in providing long term relief to the patient.    Medial branches are sensory nerve branches that connect to a facet joint and transmit sensations & pain signals from that joint.  Facet is a term for the type of joints found in the spine.  Medial branches are the nerves that go to a facet, and therefore are also sometimes called \"facet joint nerves\" (FJNs).      In a medial branch blockade procedure, xray fluoroscopy is used to verify the locations of the outside of the joint lines which are being targeted.  Under xray guidance, needles are placed to these areas.  Contrast dye is injected to confirm proper placement, with dye flowing over the joint area, and to ensure that the dye does not flow into unintended areas such as a vein.  When this is confirmed, local anesthetic is injected to block the medial branch at that joint level.      If MBBs are diagnostically successful in blocking pain, then the patient " is most likely a great candidate for Radiofrequency of those facet joint nerves.  In the RF procedure, needles are placed to the joint lines in the same fashion, and after testing, the needle tips are heated to thermally treat the nerves, blocking the nerves by in essence damaging the nerves with the heat treatment(non-pulsed).       Medically, a successful RF procedure should provide a patient with 50% pain relief or more for at least 6 months.  Clinical experience suggests that successful patients receive relief more in the range of 12 months on average.  We also discussed that a fortunate minority of patients receive therapeutic success from the MBB, and may not require RF ablation.  If a patient receives more than 8 weeks of relief from MBB, then occasional repeat MBB for therapeutic purposes is a very reasonable alternative therapy.  This course of therapy is consistent with our LCDs according to our CMS  in the area, and therefore other insurance providers should follow accordingly.  We will monitor our patients to screen for these therapeutic responders and will offer RF therapy only when necessary.      We discussed that MBB & RF are not without risks.  Guidelines regarding anticoagulant use & neuraxial procedures will be respected.  Patients that are ill or otherwise may be at risk for sepsis will not have their spines accessed by neuraxial injections of any type.  This patient will not be offered these therapies if there is an increased risk.   We discussed that there is a risk of postprocedural pain and also a risk of worsening of clinical picture with these procedures as with any neuraxial procedure.    -------  Discussed with the patient that sedation is optional for this procedure.  The sedation offered is called conscious sedation which is different from general anesthesia that is utilized in surgical procedures. The dosing of the sedation is determined by the physician and they will be  monitored throughout the procedure. With conscious sedation it is possible to remember parts or all of the procedure, this is normal. They will need to have a  with them as driving is prohibited following conscious sedation.      NPO instructions for conscious sedation:  --- Do not eat 6 hours prior to the procedure.   --- Do not drink any dairy or citrus 4 hours prior to the procedure.   --- Do not drink anything, including clear liquids, 2 hours prior to procedure.      If the NPO instructions are not followed then the procedure may be performed without sedation or the procedure will need to be rescheduled.   --- Trial Celebrex 200mg daily. Discussed medication with the patient.  Included in this discussion was the potential for side effects and adverse events.  Patient verbalized understanding and wished to proceed.  Prescription will be sent to pharmacy.   --- Follow-up for procedure    Diagnostic Facet Joint Procedure:   PHIL   The confirmatory diagnostic facet joint procedure is considered medically reasonable and necessary for the diagnosis and treatment of chronic pain for this patient due to the patient meeting all of the following criteria:    1. Moderate to severe chronic neck or low back pain, predominantly axial, that causes functional deficit measured on pain or disability scale.  2. Pain present for minimum of 3 months with documented failure to respond to noninvasive conservative management (as tolerated)  3. Absence of untreated radiculopathy or neurogenic claudication (except for radiculopathy caused by facet joint synovial cyst)  4. There is no non-facet pathology per clinical assessment or radiology studies that could explain the source of the patient’s pain, including but not limited to fracture, tumor, infection, or significant deformity.    The patient has also shown a consistent positive response of at least 80% relief of primary (index) pain (with the duration of relief being consistent  with the agent used).    Yolanda Lee Hatchett  reports that she has been smoking cigarettes. She started smoking about 52 years ago. She has a 25.00 pack-year smoking history. She has been exposed to tobacco smoke. She has never used smokeless tobacco.. I have educated her on the risk of diseases from using tobacco products such as cancer, COPD, and heart disease.     I advised her to quit and she is not willing to quit.    I spent 3  minutes counseling the patient.     DEEPTI REPORT  As the clinician, I personally reviewed the DEEPTI from 1/23/24 while the patient was in the office today.    Dictated utilizing Dragon dictation.

## 2024-01-26 ENCOUNTER — TRANSCRIBE ORDERS (OUTPATIENT)
Dept: SURGERY | Facility: SURGERY CENTER | Age: 64
End: 2024-01-26
Payer: COMMERCIAL

## 2024-01-26 DIAGNOSIS — Z41.9 SURGERY, ELECTIVE: Primary | ICD-10-CM

## 2024-02-26 ENCOUNTER — OFFICE VISIT (OUTPATIENT)
Dept: ONCOLOGY | Facility: CLINIC | Age: 64
End: 2024-02-26
Payer: COMMERCIAL

## 2024-02-26 ENCOUNTER — LAB (OUTPATIENT)
Dept: LAB | Facility: HOSPITAL | Age: 64
End: 2024-02-26
Payer: COMMERCIAL

## 2024-02-26 VITALS
DIASTOLIC BLOOD PRESSURE: 79 MMHG | OXYGEN SATURATION: 99 % | TEMPERATURE: 98.6 F | RESPIRATION RATE: 16 BRPM | WEIGHT: 133.4 LBS | HEART RATE: 71 BPM | HEIGHT: 62 IN | BODY MASS INDEX: 24.55 KG/M2 | SYSTOLIC BLOOD PRESSURE: 155 MMHG

## 2024-02-26 DIAGNOSIS — C34.92 ADENOCARCINOMA OF LEFT LUNG: Primary | ICD-10-CM

## 2024-02-26 DIAGNOSIS — M48.062 SPINAL STENOSIS OF LUMBAR REGION WITH NEUROGENIC CLAUDICATION: ICD-10-CM

## 2024-02-26 DIAGNOSIS — C34.92 ADENOCARCINOMA OF LEFT LUNG: ICD-10-CM

## 2024-02-26 LAB
BASOPHILS # BLD AUTO: 0.08 10*3/MM3 (ref 0–0.2)
BASOPHILS NFR BLD AUTO: 1.1 % (ref 0–1.5)
DEPRECATED RDW RBC AUTO: 37.9 FL (ref 37–54)
EOSINOPHIL # BLD AUTO: 0.27 10*3/MM3 (ref 0–0.4)
EOSINOPHIL NFR BLD AUTO: 3.6 % (ref 0.3–6.2)
ERYTHROCYTE [DISTWIDTH] IN BLOOD BY AUTOMATED COUNT: 13.3 % (ref 12.3–15.4)
HCT VFR BLD AUTO: 40 % (ref 34–46.6)
HGB BLD-MCNC: 14.2 G/DL (ref 12–15.9)
IMM GRANULOCYTES # BLD AUTO: 0.22 10*3/MM3 (ref 0–0.05)
IMM GRANULOCYTES NFR BLD AUTO: 2.9 % (ref 0–0.5)
LYMPHOCYTES # BLD AUTO: 3.29 10*3/MM3 (ref 0.7–3.1)
LYMPHOCYTES NFR BLD AUTO: 43.6 % (ref 19.6–45.3)
MCH RBC QN AUTO: 28.4 PG (ref 26.6–33)
MCHC RBC AUTO-ENTMCNC: 35.5 G/DL (ref 31.5–35.7)
MCV RBC AUTO: 80 FL (ref 79–97)
MONOCYTES # BLD AUTO: 0.45 10*3/MM3 (ref 0.1–0.9)
MONOCYTES NFR BLD AUTO: 6 % (ref 5–12)
NEUTROPHILS NFR BLD AUTO: 3.24 10*3/MM3 (ref 1.7–7)
NEUTROPHILS NFR BLD AUTO: 42.8 % (ref 42.7–76)
NRBC BLD AUTO-RTO: 2.1 /100 WBC (ref 0–0.2)
PLATELET # BLD AUTO: 303 10*3/MM3 (ref 140–450)
PMV BLD AUTO: 9.8 FL (ref 6–12)
RBC # BLD AUTO: 5 10*6/MM3 (ref 3.77–5.28)
WBC NRBC COR # BLD AUTO: 7.55 10*3/MM3 (ref 3.4–10.8)

## 2024-02-26 PROCEDURE — 99213 OFFICE O/P EST LOW 20 MIN: CPT | Performed by: INTERNAL MEDICINE

## 2024-02-26 PROCEDURE — 36415 COLL VENOUS BLD VENIPUNCTURE: CPT

## 2024-02-26 PROCEDURE — 85025 COMPLETE CBC W/AUTO DIFF WBC: CPT

## 2024-02-26 NOTE — PROGRESS NOTES
SUBJECTIVE:   CC: Akiko Francisco is an 23 year old woman who presents for preventive health visit.     Patient has been advised of split billing requirements and indicates understanding: Yes  Healthy Habits:     Getting at least 3 servings of Calcium per day:  Yes    Bi-annual eye exam:  Yes    Dental care twice a year:  Yes    Sleep apnea or symptoms of sleep apnea:  None    Diet:  Other    Frequency of exercise:  6-7 days/week    Duration of exercise:  Greater than 60 minutes    Taking medications regularly:  Yes    Medication side effects:  None    PHQ-2 Total Score: 0    Additional concerns today:  Yes    Graduated college in May  Working at [employer]  Boyfriend  Sister had leukemia in childhood        Today's PHQ-2 Score:   PHQ-2 ( 1999 [employer]) 3/17/2022   Q1: Little interest or pleasure in doing things 0   Q2: Feeling down, depressed or hopeless 0   PHQ-2 Score 0   PHQ-2 Total Score (12-17 Years)- Positive if 3 or more points; Administer PHQ-A if positive -   Q1: Little interest or pleasure in doing things Not at all   Q2: Feeling down, depressed or hopeless Not at all   PHQ-2 Score 0     Abuse: Current or Past (Physical, Sexual or Emotional) - No  Do you feel safe in your environment? Yes      Social History     Tobacco Use     Smoking status: Never Smoker     Smokeless tobacco: Never Used     Tobacco comment: non smoking home   Substance Use Topics     Alcohol use: No       Alcohol Use 3/17/2022   Prescreen: >3 drinks/day or >7 drinks/week? Not Applicable   Prescreen: >3 drinks/day or >7 drinks/week? -     Reviewed orders with patient.  Reviewed health maintenance and updated orders accordingly - Yes    Breast Cancer Screening:      History of abnormal Pap smear: NO - age 21-29 PAP every 3 years recommended     Reviewed and updated as needed this visit by clinical staff   Tobacco  Allergies    Med Hx  Surg Hx  Fam Hx  Soc Hx      Reviewed and updated as needed this visit by Provider              Subjective .  Patient with increasing back pain  REASONS FOR FOLLOWUP:    1. Nonsmall cell lung carcinoma, stage IIA (down staged from presumed stage IIIA). The patient received neoadjuvant chemotherapy with carboplatin/Alimta x2. Followup scans showed considerable response. The patient is status post right upper lobectomy and lymphadenectomy (discharged 03/07/2012). Per Thoracic Conference, additional two cycles of adjuvant carboplatin/Alimta was advised and initiated on 04/11/2012. She has since remained in remission.     2.  Chest x-ray February 2004, July 20 2020-left hilar nodule 10 mm reduced to 6 mm    3.  Low-dose CT scan without evidence of recurrent disease 5/17/2021    4.  Patient seen 5/9/2022 with bilateral hip pain, abdominal swelling    5.  Patient assessed by CT chest and pelvis at [facility] 5/24/2022, pulmonary nodule, bilateral adrenal nodules, follow-up scans in 3 months planned, smoking cessation planned    6.  Patient reassessed CT chest abdomen pelvis 11/1/2022-no interval change with metastatic disease in chest abdomen pelvis.  Patient proceeding to smoking cessation.    7.  Patient seen 2/23/2023, primary care assessment with ultrasound of thyroid, MRI lumbar spine.  Patient status post endoscopy with polyps removed.    8.  Patient seen 9/7/2023 with pain management continuing, restart of additional anti-inflammatory.  No evidence of metastatic disease    9.  Insurance reinstated, rereferral back to pain management 1/15/2024    10.  Plans for annual branch block and RFA therapy 3/6/2024            History of Present Illness    The patient is a 63 y.o. female with the above-mentioned history, who returns the office today for 3-month follow-up.  She remains in observation in regards to her malignancy.  She did undergo surveillance CT scan July 2019 with a left lower lobe nodule, 1.1 cm noted.  She then underwent PET scan 7/24/2019 with no metabolic activity identified.   The  "      Review of Systems   Constitutional: Negative for chills and fever.   HENT: Negative for congestion, ear pain, hearing loss and sore throat.    Eyes: Negative for pain and visual disturbance.   Respiratory: Negative for cough and shortness of breath.    Cardiovascular: Negative for chest pain, palpitations and peripheral edema.   Gastrointestinal: Negative for abdominal pain, constipation, diarrhea, heartburn, hematochezia and nausea.   Breasts:  Negative for tenderness, breast mass and discharge.   Genitourinary: Negative for dysuria, frequency, genital sores, hematuria, pelvic pain, urgency, vaginal bleeding and vaginal discharge.   Musculoskeletal: Negative for arthralgias, joint swelling and myalgias.   Skin: Negative for rash.   Neurological: Negative for dizziness, weakness, headaches and paresthesias.   Psychiatric/Behavioral: Negative for mood changes. The patient is not nervous/anxious.       OBJECTIVE:   /86   Pulse 83   Temp 97.8  F (36.6  C) (Tympanic)   Resp 16   Ht 1.702 m (5' 7\")   Wt 83.5 kg (184 lb)   SpO2 98%   BMI 28.82 kg/m    Physical Exam  GENERAL: healthy, alert and no distress  EYES: Eyes grossly normal to inspection, and conjunctivae and sclerae normal  HENT: ear canals and TM's normal, nose and mouth without ulcers or lesions  NECK: no adenopathy, no asymmetry, masses, or scars   RESP: lungs clear to auscultation - no rales, rhonchi or wheezes  CV: regular rate and rhythm, normal S1 S2, no S3 or S4, no murmur, click or rub, no peripheral edema and peripheral pulses strong  ABDOMEN: soft, nontender   (female): normal female external genitalia, normal urethral meatus, vaginal mucosa pink, moist, well rugated, and normal cervix/adnexa/uterus without masses or discharge  MS: no gross musculoskeletal defects noted, no edema  SKIN: no suspicious lesions or rashes  NEURO: Normal strength and tone, mentation intact and speech normal  PSYCH: mentation appears normal, affect " patient reports today she overall feels very well.  She does have some chronic shortness of breath on exertion though recovers quickly with rest.  She has been exercising and changed her diet, therefore has lost some weight.  She continues to have a chronic cough with occasional productive clear sputum.  She is attempting to stop smoking, though continues to smoke 1 pack/day.  She was recently prescribed Chantix by pulmonology.  She denies any new pain.  The patient is next seen January 2, 2020.  Unfortunately she is no longer covered by insurance though she remains, understandably, concerned about a previously noted left lower lobe pulmonary nodule.  A follow-up scan was scheduled though she is not able to cover that currently financially.      Plans were made for reassessment and the patient is next seen February 12, 2020 with repeat chest x-ray showing again a 1 cm lung nodule projecting just lateral to the left hilum between the level of the posterior left eighth and ninth ribs.  Fortunately she has not further symptomatic at this point additionally.  A follow-up chest x-ray was requested and obtained July 20, 2020 fortunately demonstrating a reduction in the left hilar nodule from 10 mm now to 6 mm.  She continues to have ongoing back pain indicating that insurance coverage has lapsed.        We elected to obtain a follow-up chest x-ray the patient is seen March 1, 2021.  Fortunately she is feeling well and repeat chest x-ray February 22, 2021 shows pulmonary hyperexpansion.  The previously noted lung nodules not noted left lower lobe or other suspicious lesion.  We have discussed follow-up low-dose CT scanning.    The patient underwent low-dose CT scan 5/17/2021 showing stable fairly dense pulmonary nodule left lower lobe that was thought to be granulomatous.  There were no other findings that might be suspicious for malignancy.  Patient, fortunately is feeling fair except for chronic back pain.  This is being  "normal/bright    ASSESSMENT/PLAN:       ICD-10-CM    1. Routine general medical examination at a health care facility  Z00.00    2. Cervical cancer screening  Z12.4 Pap Screen only - recommended age 21 - 24 years   3. Menorrhagia with irregular cycle  N92.1 norgestimate-ethinyl estradiol (SPRINTEC 28) 0.25-35 MG-MCG tablet     1. Tdap and 3rd HPV vaccines today.  3. Periods good on OCP.     COUNSELING:  Reviewed preventive health counseling, as reflected in patient instructions    Estimated body mass index is 28.82 kg/m  as calculated from the following:    Height as of this encounter: 1.702 m (5' 7\").    Weight as of this encounter: 83.5 kg (184 lb).        She reports that she has never smoked. She has never used smokeless tobacco.      Counseling Resources:  ATP IV Guidelines  Pooled Cohorts Equation Calculator  Breast Cancer Risk Calculator  BRCA-Related Cancer Risk Assessment: FHS-7 Tool  FRAX Risk Assessment  ICSI Preventive Guidelines  Dietary Guidelines for Americans, 2010  USDA's MyPlate  ASA Prophylaxis  Lung CA Screening    Liza Mccabe MD  [facility] JUD[facility]  " assessed by her primary care physician with additional films.    The patient is reassessed 11/7/2021.  Mammographic screening 10/11/2021 with a negative, chest x-ray was performed 11/6/2021 and is negative for new abnormalities.  Patient states that Dr. Wiley  MRI of the lumbar spine is reviewed result the patient's current low back pain    The patient is next seen, ever, 5/9/2022 indicating that she has been having increasing hip pain as well as abdominal swelling.  Her primary care had endeavor to have her undergo repeat scans that are not covered at [facility] and thus we will try to schedule as an outpatient at a [facility] facility.    Patient had her scans performed at [facility] CT abdomen pelvis 5/24/2022 demonstrating indeterminate 8 x 9 mm left lower lobe pulmonary nodule, indeterminate bilateral adrenal gland nodules measuring up to 1.4 cm.  There is no comparison studies done on these examinations.    The patient is seen 6/13/2022 discussed that she had previous scans 2015 showing a left adrenal adenoma but no abnormalities in the right adrenal gland.  Follow-up studies scheduled 3 months intervals [place].  Smoking cessation plan and pulmonary referral planned.    Patient seen by pulmonary medicine 6/17/2022-COPD with mild exacerbation noted, Chantix trial.    The patient is next assessed 11/9/2022 with CT chest abdomen pelvis reviewed showing no clear evidence of metastatic disease to the chest or pelvis, status post right upper lobectomy, 9 mm nodule superior segment left lower lobe without change, new onset metastatic disease in abdomen pelvis with 2 splenic cysts that are new, bilateral adrenal nodules without change.  The patient is seen formally 11/9/2022 fortunately feeling reasonably well though still having extremity pain that may be peripheral vascular disease and she is urged strongly to discontinue smoking altogether at this point.  She is proceeding through nicotine lozenges.   Fortunately her scans, as above, do not demonstrate progressive malignancy.  She does describe a positive Cologuard exam however and has been urged to proceed to colonoscopy which is now being scheduled.    The patient is next assessed 2/23/2023 having issues with back pain leading to primary care to assess an MRI of the lumbar spine now pending as well as ultrasound of thyroid after further thyromegaly noted, normal TFTs.  Notably she proceeded through colonoscopy with 2 polyps removed 1/10/2023-tubular adenoma in ascending colon, hyperplastic polyp in the rectum.    Subsequent MRI testing 3/24/2023 demonstrated L2 with respect L3 diffuse annular disc bulge and posterior epidural fat contributing to moderate moderate narrowing of thecal sac, L3-L4 with mild to moderate bilateral facet overgrowth and diffuse posterior disc osteophyte complex.  Similar findings L4-5, L5 to/1 with moderate foraminal narrowing.  Patient felt to be candidate for epidural steroid injections and medial branch blockade now also anticipated.  There is, fortunately, no evidence of metastatic disease present though the patient is quite symptomatic per back pain.    The patient did undergo therapy through pain management to modest improvement, loss of insurance just prior to her last trial.  Her insurance has now been reinstated when she is seen 1/15/2024 and her back pain persists.  We have discussed repeat pain management assessment at this point.      Patient assessed 2/26/2024 with plans for annual branch block and RFA therapy 3/6/2024    Past Medical History:   Diagnosis Date    Asthma     Bleeding of blood vessel 01/05/2011    BRAIN    Carpal tunnel syndrome on right     COPD (chronic obstructive pulmonary disease)     MODERATE    DDD (degenerative disc disease), cervical 09/22/2014    MODERATE C6-C7, SEEING DR. EWING    Drug therapy     lung ca    Emphysema of lung     Fibroids     UTERINE X 2    Hx of radiation therapy     lung ca     Lung cancer     MI (myocardial infarction) 06/2009    Mitral insufficiency     Non-small cell carcinoma of lung     Stabe IIA    Ovarian cyst     RIGHT    Pain of right upper extremity 11/18/2015    Pelvic pain     Positive colorectal cancer screening using Cologuard test     Right shoulder pain     SOB (shortness of breath)     Thyroid nodule     Vertigo 05/13/2015    Weight loss        ONCOLOGIC HISTORY:  (History from previous dates can be found in the separate document.)    Current Outpatient Medications on File Prior to Visit   Medication Sig Dispense Refill    albuterol sulfate HFA (Ventolin HFA) 108 (90 Base) MCG/ACT inhaler Inhale 2 puffs Every 4 (Four) Hours As Needed for Wheezing. 17 g 3    Anoro Ellipta 62.5-25 MCG/ACT aerosol powder  inhaler 1 puff As Needed.      celecoxib (CeleBREX) 200 MG capsule Take 1 capsule by mouth Daily. 30 capsule 0    fexofenadine (Allegra Allergy) 180 MG tablet Take 1 tablet by mouth Daily. 30 tablet 2     No current facility-administered medications on file prior to visit.       ALLERGIES:     Allergies   Allergen Reactions    Codeine Irritability       Social History     Socioeconomic History    Marital status:     Years of education: College   Tobacco Use    Smoking status: Every Day     Packs/day: 0.50     Years: 50.00     Additional pack years: 0.00     Total pack years: 25.00     Types: Cigarettes     Start date: 1972     Passive exposure: Current    Smokeless tobacco: Never    Tobacco comments:     11/28/22 - reports 3 cigs/day + 3 lozenges/day   Vaping Use    Vaping Use: Never used   Substance and Sexual Activity    Alcohol use: Yes     Comment: OCCASIONAL    Drug use: Yes     Types: Marijuana    Sexual activity: Not Currently     Partners: Male     Birth control/protection: Post-menopausal         Cancer-related family history includes Brain cancer in her father; Cancer (age of onset: 47) in her mother. There is no history of Breast cancer.      Review of  "Systems   Constitutional:  Negative for chills, fatigue and fever.   HENT:  Negative for mouth sores and sore throat.    Eyes:  Negative for visual disturbance.   Respiratory:  Negative for cough, chest tightness, shortness of breath (chronic unchanged) and wheezing.    Cardiovascular:  Negative for chest pain and leg swelling.   Gastrointestinal:  Negative for abdominal pain, constipation and vomiting.   Genitourinary:  Negative for dysuria and frequency.   Musculoskeletal:  Positive for back pain, joint swelling, myalgias and neck pain.   Neurological:  Positive for numbness. Negative for dizziness and weakness.   Hematological:  Does not bruise/bleed easily.   Psychiatric/Behavioral:  The patient is not nervous/anxious.    All other systems reviewed and are negative.  Review of systems 09/18/2019  unchanged from previous office visit except as updated.       Objective      Vitals:    02/26/24 1600   BP: 155/79   Pulse: 71   Resp: 16   Temp: 98.6 °F (37 °C)   TempSrc: Temporal   SpO2: 99%   Weight: 60.5 kg (133 lb 6.4 oz)   Height: 157.5 cm (62.01\")   PainSc:   4   PainLoc: Back             2/26/2024     4:02 PM   Current Status   ECOG score 0       Physical Exam    GENERAL:  female alert and oriented.   SKIN: Warm, dry without rashes, purpura or petechiae.   HEAD: Normocephalic.   EYES: Pupils equal, round and reactive to light. EOMs intact. Conjunctivae normal.   EARS: Hearing intact.   NOSE: Septum midline. No excoriations or nasal discharge.   MOUTH: Tongue is well-papillated; no stomatitis or ulcers. Lips normal.   THROAT: Oropharynx without lesions or exudates.   NECK: Supple with good range of motion; progressive thyromegaly noted, no JVD or bruits.   LYMPHATICS: No cervical, supraclavicular adenopathy.   CHEST: Lungs clear to auscultation, prolonged expiratory phase noted bilaterally.   CARDIAC: Regular rate and rhythm without murmurs, rubs or gallops. Bowel sounds present.  ABDOMEN: Soft, " nontender with no organomegaly or masses.   EXTREMITIES: No clubbing, cyanosis or edema.   NEUROLOGICAL: No focal neurological deficits. .      RECENT LABS:  Results from last 7 days   Lab Units 02/26/24  1546   WBC 10*3/mm3 7.55   NEUTROS ABS 10*3/mm3 3.24   HEMOGLOBIN g/dL 14.2   HEMATOCRIT % 40.0   PLATELETS 10*3/mm3 303          SCANNED - IMAGING (05/24/2022)      Assessment plan;        1. Nonsmall cell lung carcinoma, stage IIA (down staged from presumed stage IIIA).  Staging MRI of the brain negative. The patient received neoadjuvant chemotherapy with carboplatin/Alimta x2. Followup scans showed considerable response. The patient is status post right upper lobectomy and lymphadenectomy (discharged 03/07/2012). Per Thoracic Conference, additional two cycles of adjuvant carboplatin/Alimta was advised and initiated on 04/11/2012. She has since remained in remission.    Low-dose screening CT of the chest July 2018 with a 1.1 left lower lobe nodule noted.  Follow-up PET scan the nodule was photopenic.  The patient has a follow-up review now in February 2020 after we have her apply for assistance with [facility] [place].  A chest x-ray be requested in 5 weeks to see the physician in 6 weeks.  This proceeded with patient assessed February 12, 2020 with chest x-ray February 4  not significantly changed from previous.  At this point will reassess again at 6 months with MD, chest x-ray CBC and CMP.     This is reassessed September 30, 2020 with a left hilar nodule having dropped from 10 mm to 6 mm.  We will continue reassessment every 6 months.  The patient's follow-up chest x-ray February 22, 2021 is negative for abnormalities though low-dose CT scan is felt reasonable and be scheduled within the next 3 months.  She will be seen formally after the scan is done.  She agrees with this plan and follow-up.  The patient's follow-up low-dose CT chest done in follow-up 5/17/2021 was negative for recurrent disease.  There  remains stable fairly dense pulmonary nodule left lower lobe thought to be a granuloma.  The patient had a follow-up chest x-ray 11/6/2021 without new abnormalities.  We discussed a repeat low-dose CT scan but she has an MRI possibly scheduled next several months of the lumbar spine.    Paced rhythm at 5/9/2022 with progressive symptoms of abdominal discomfort and swelling, her physical exam is not particularly abnormal but she is also having a little hip pain and has chronic issues with shortness of breath and cough as well as smoking.    Patient underwent [facility] at [facility] with CT chest abdomen pelvis 5/24/2022 demonstrating indeterminate 8 x 9 mm left lower lobe pulmonary nodule, indeterminate bilateral adrenal gland nodules measuring up to 1.4 cm.  There is no comparison studies done on these examinations.    The patient is seen 6/13/2022 discussed that she had previous scans 2015 showing a left adrenal adenoma but no abnormalities in the right adrenal gland.  Follow-up studies scheduled 3 months intervals [place].  Smoking cessation plan and pulmonary referral planned.  Patient assessed by follow-up CT chest abdomen pelvis 10/12/2022 without evidence of recurrent disease.    Subsequent MRI testing 3/24/2023 demonstrated L2 with respect L3 diffuse annular disc bulge and posterior epidural fat contributing to moderate moderate narrowing of thecal sac, L3-L4 with mild to moderate bilateral facet overgrowth and diffuse posterior disc osteophyte complex.  Similar findings L4-5, L5 to/1 with moderate foraminal narrowing.  Patient felt to be candidate for epidural steroid injections and medial branch blockade now also anticipated.  There is, fortunately, no evidence of metastatic disease present though the patient is quite symptomatic per back pain.    Patient is a 2/6/2024 follow-up anticipated 3 months.        2.  COPD.  Currently without symptoms of exacerbation.  She does see pulmonology and a  follow-up will be scheduled.      3.  Smoking cessation.  Patient currently continuing with nicotine lozenges     4. Chronic pain. The patient is seen and managed by pain management.  Recent increase in back pain noted by primary care with subsequent MRI testing 3/24/2023 demonstrated L2 with respect L3 diffuse annular disc bulge and posterior epidural fat contributing to moderate moderate narrowing of thecal sac, L3-L4 with mild to moderate bilateral facet overgrowth and diffuse posterior disc osteophyte complex.  Similar findings L4-5, L5 to/1 with moderate foraminal narrowing.  Patient felt to be candidate for epidural steroid injections and medial branch blockade now also anticipated.  There is, fortunately, no evidence of metastatic disease present though the patient is quite symptomatic per back pain.  The patient did undergo therapy through pain management to modest improvement, loss of insurance just prior to her last trial.  Her insurance has now been reinstated when she is seen 1/15/2024 and her back pain persists.  We have discussed repeat pain management assessment at this point.  Plans for annual branch block and RFA therapy 3/6/2024        5.  Recent positive findings for Cologuard, colonoscopy as above with polyps removed including ascending colon tubular adenoma and hyperplastic polyp in the rectum.    6.  Thyroid ultrasound 3/24/2023 thyroid nodules-1 year follow-up indicated.

## 2024-02-27 ENCOUNTER — TELEPHONE (OUTPATIENT)
Dept: FAMILY MEDICINE CLINIC | Facility: CLINIC | Age: 64
End: 2024-02-27
Payer: COMMERCIAL

## 2024-02-27 NOTE — TELEPHONE ENCOUNTER
Hub to relay:    See if patient has seen hand surgery. We are follow up on patient referral. Please let us know if patient wants to be seen by a specialist or no. Thanks

## 2024-02-28 NOTE — TELEPHONE ENCOUNTER
Name: Hatchett, Yolanda Lee    Relationship: Self    Best Callback Number: [phone]    HUB PROVIDED THE RELAY MESSAGE FROM THE OFFICE   PATIENT HAS FURTHER QUESTIONS AND WOULD LIKE A CALL BACK AT THE FOLLOWING PHONE NUMBER [phone]    ADDITIONAL INFORMATION:   PATIENT DOES WANT REFERRAL. PLEASE CALL HER BACK AT 9 AM OR 1:30 PM HER BREAK TIMES

## 2024-03-05 NOTE — DISCHARGE INSTRUCTIONS
[facility] [place] [facility] - Post-procedure Instructions          --  While there are no absolute restrictions, it is recommended that you do not perform strenuous activity today. In the morning, you may resume your level of activity as before your block.    --  If you have a band-aid at your injection site, please remove it later today. Observe the area for any redness, swelling, pus-like drainage, or a temperature over 101°. If any of these symptoms occur, please call your doctor at [phone]. If after office hours, leave a message and the on-call provider will return your call.    --  Ice may be applied to your injection site. It is recommended you avoid direct heat (heating pad; hot tub) for 1-2 days.    --  Call [facility] [place] [facility] at [phone] if you experience persistent headache, persistent bleeding from the injection site, or severe pain not relieved by heat or oral medication.    --  Do not make important decisions today.    --  Due to the effects of the block and/or the I.V. Sedation, DO NOT drive or operate hazardous machinery for 12 hours.  Local anesthetics may cause numbness after procedure and precautions must be taken with regards to operating equipment as well as with walking, even if ambulating with assistance of another person or with an assistive device.    --  Do not drink alcohol for 12 hours.    -- You may return to work tomorrow, or as directed by your referring doctor.    --  Occasionally you may notice a slight increase in your pain after the procedure. This should start to improve within the next 24-48 hours. Radiofrequency ablation procedure pain may last 3-4 weeks.    --  It may take as long as 3-4 days before you notice a gradual improvement in your pain and/or other symptoms.    -- You may continue to take your prescribed pain medication as needed.    --  Some normal possible side effects of steroid use could include fluid retention, increased blood sugar, dull headache,  increased sweating, increased appetite, mood swings and flushing.    --  Diabetics are recommended to watch their blood glucose level closely for 24-48 hours after the injection.    --  Must stay in PACU for 20 min upon arrival and prove no leg weakness before being discharged.    --  IN THE EVENT OF A LIFE THREATENING EMERGENCY, (CHEST PAIN, BREATHING DIFFICULTIES, PARALYSIS…) YOU SHOULD GO TO YOUR NEAREST EMERGENCY ROOM.    --  You should be contacted by our office within 2-3 days to schedule follow up or next appointment date.  If not contacted within 7 days, please call the office at [phone]     If you have even 1 hour of relief, these injections are considered successful.

## 2024-03-06 ENCOUNTER — HOSPITAL ENCOUNTER (OUTPATIENT)
Dept: GENERAL RADIOLOGY | Facility: SURGERY CENTER | Age: 64
Setting detail: HOSPITAL OUTPATIENT SURGERY
End: 2024-03-06
Payer: COMMERCIAL

## 2024-03-06 ENCOUNTER — HOSPITAL ENCOUNTER (OUTPATIENT)
Facility: SURGERY CENTER | Age: 64
Setting detail: HOSPITAL OUTPATIENT SURGERY
Discharge: HOME | End: 2024-03-06
Attending: ANESTHESIOLOGY | Admitting: ANESTHESIOLOGY
Payer: COMMERCIAL

## 2024-03-06 VITALS
OXYGEN SATURATION: 98 % | BODY MASS INDEX: 24.48 KG/M2 | HEART RATE: 84 BPM | DIASTOLIC BLOOD PRESSURE: 86 MMHG | TEMPERATURE: 97.5 F | SYSTOLIC BLOOD PRESSURE: 136 MMHG | RESPIRATION RATE: 16 BRPM | WEIGHT: 133 LBS | HEIGHT: 62 IN

## 2024-03-06 DIAGNOSIS — M47.816 LUMBAR FACET ARTHROPATHY: ICD-10-CM

## 2024-03-06 DIAGNOSIS — G89.29 CHRONIC BILATERAL LOW BACK PAIN WITHOUT SCIATICA: ICD-10-CM

## 2024-03-06 DIAGNOSIS — M54.50 CHRONIC BILATERAL LOW BACK PAIN WITHOUT SCIATICA: ICD-10-CM

## 2024-03-06 DIAGNOSIS — Z41.9 SURGERY, ELECTIVE: ICD-10-CM

## 2024-03-06 PROCEDURE — 25010000002 BUPIVACAINE (PF) 0.25 % SOLUTION 10 ML VIAL: Performed by: ANESTHESIOLOGY

## 2024-03-06 PROCEDURE — 77002 NEEDLE LOCALIZATION BY XRAY: CPT

## 2024-03-06 PROCEDURE — 64493 INJ PARAVERT F JNT L/S 1 LEV: CPT | Performed by: ANESTHESIOLOGY

## 2024-03-06 PROCEDURE — 76000 FLUOROSCOPY <1 HR PHYS/QHP: CPT

## 2024-03-06 PROCEDURE — 64494 INJ PARAVERT F JNT L/S 2 LEV: CPT | Performed by: ANESTHESIOLOGY

## 2024-03-06 RX ORDER — DIAZEPAM 5 MG/1
10 TABLET ORAL ONCE
Status: COMPLETED | OUTPATIENT
Start: 2024-03-06 | End: 2024-03-06

## 2024-03-06 RX ADMIN — DIAZEPAM 10 MG: 5 TABLET ORAL at 13:16

## 2024-03-06 NOTE — OP NOTE
Lumbar Medial Branch Blockade at  Bilateral L4-S1  [facility]      PREOPERATIVE DIAGNOSIS:  Lumbar spondylosis without myelopathy    POSTOPERATIVE DIAGNOSIS:  Lumbar spondylosis without myelopathy    PROCEDURE:   Diagnostic Lumbar Medial Branch Nerve Blockades, with fluoroscopy:  at the L4, L5 transverse processes and the sacral alar groove)   96808-79 -- Lumbar Facet blocks, 1st Level  28949-59 -- Lumbar Facet blocks, 2nd  Level     PRE-PROCEDURE DISCUSSION WITH PATIENT:    Risks and complications were discussed with the patient prior to starting the procedure and informed consent was obtained.      SURGEON:  Jenny Bauman MD    REASON FOR PROCEDURE:    The patient complains of pain that seems to have a significant axial component and Previous diagnostic positivity of a Lumbar Medial Branch Blockade at the same levels    SEDATION:  Anxiolysis was used for this procedure which included PO Valium 10mg  ANESTHETIC:  0.25% bupivacaine  STEROID:  None  TOTAL VOLUME OF SOLUTION:  6ml    DESCRIPTON OF PROCEDURE:  After obtaining informed consent, IV access was not obtained in the preoperative area.   The patient was taken to the operating room.  The patient was placed in the prone position with a pillow under the abdomen. All pressure points were well padded.  EKG, blood pressure, and pulse oximeter were monitored.  The patient was monitored and sedated by the RN under my direction. The lumbosacral area was prepped with Chloraprep and draped in a sterile fashion.     AP fluoroscopic image was used to visualize the junction of the right S1 superior articular process with the sacral ala.  The skin and subcutaneous tissue over the area was anesthetized with 1% lidocaine.  A 22-gauge spinal needle was then advanced percutaneously through the anesthetized skin tract under fluoroscopic guidance in a coaxial view to contact periosteum.  After negative aspiration, a volume of 1 mL of the local anesthetic was  injected without resistance.  The image was then optimized to maximize visualization of the junctions of the L4, L5 superior articular processes with the transverse processes.  The skin and subcutaneous tissue over the areas was anesthetized with 1% lidocaine.  A 22-gauge spinal needle was then advanced percutaneously through the anesthetized skin tracts under fluoroscopic guidance in a coaxial view to contact periosteum at the target sites.  After negative aspiration, a volume of 1 mL of the local anesthetic  was injected without resistance at each of the target sites.      The same procedure was then performed on the contralateral side in the exact same fashion.        Onset of analgesia was noted.  Vital signs remained stable throughout.      ESTIMATED BLOOD LOSS:  <5 mL  SPECIMENS:  none    COMPLICATIONS:   No complications were noted.    TOLERANCE & DISCHARGE CONDITION:    The patient tolerated the procedure well.  The patient was transported to the recovery area without difficulties.  The patient was discharged to home under the care of family in stable and satisfactory condition.    Pre-procedure pain score: 0/10 at rest, 8/10 with activity  Post-procedure pain score: 0/10    PLAN OF CARE:  The patient was given our standard instruction sheet.  We discussed that Lumbar Medial Branch Blockade is a diagnostic procedure in consideration for radiofrequency ablation if two diagnostic procedures prove to be positive for significant benefit.  An alternative plan could be therapeutic lumbar branch blockades.  The patient is asked to keep an account of pain relief after the procedure today.  The patient will  Return to clinic 1-2 wks.  The patient will resume all medications as per the medication reconciliation sheet.

## 2024-03-06 NOTE — H&P
[facility]   HISTORY AND PHYSICAL    Patient Name: Yolanda Lee Hatchett  : 1960  MRN: [id number]  Primary Care Physician:  Jovany Wiley MD  Date of admission: 3/6/2024    Subjective   Subjective     Chief Complaint: Low back pain    History of Present Illness  Chronic, axial low back pain.       Review of Systems   Constitutional:  Negative for chills and fever.   Respiratory:  Negative for cough and shortness of breath.    Musculoskeletal:  Positive for back pain.        Personal History     Past Medical History:   Diagnosis Date    Asthma     Bleeding of blood vessel 2011    BRAIN    Carpal tunnel syndrome on right     COPD (chronic obstructive pulmonary disease)     MODERATE    DDD (degenerative disc disease), cervical 2014    MODERATE C6-C7, SEEING DR. EWING    Drug therapy     lung ca    Emphysema of lung     Fibroids     UTERINE X 2    Hx of radiation therapy     lung ca    Lung cancer     MI (myocardial infarction) 2009    Mitral insufficiency     Non-small cell carcinoma of lung     Stabe IIA    Ovarian cyst     RIGHT    Pain of right upper extremity 2015    Pelvic pain     Positive colorectal cancer screening using Cologuard test     Right shoulder pain     SOB (shortness of breath)     Thyroid nodule     Vertigo 2015    Weight loss        Past Surgical History:   Procedure Laterality Date    APPENDECTOMY      CATARACT EXTRACTION, BILATERAL  2019    COLONOSCOPY N/A 1/10/2023    Procedure: COLONOSCOPY TO CECUM AND TERMINAL ILEUM WITH COLD SNARE POLYPECTOMIES;  Surgeon: Maribell Sam MD;  Location[facility];  Service: Gastroenterology;  Laterality: N/A;  PRE- POSITIVE COLOGUARD  POST- COLON POLYPS, HEMORRHOIDS    EPIDURAL Left 2023    Procedure: LEFT L5 & S1 TRANSFORAMINAL LUMBAR EPIDURAL STEROID INJECTION  CPT: 47375,63357;  Surgeon: Jenny Bauman MD;  Location: [facility] MA[facility] OR;  Service: Pain Management;  Laterality: Left;    LAPAROSCOPIC  TUBAL LIGATION      LUMBAR EPIDURAL INJECTION N/A 5/15/2023    Procedure: LUMBAR EPIDURAL 1ST VISIT L5-S1 37140;  Surgeon: Jenny Bauman MD;  Location: [facility];  Service: Pain Management;  Laterality: N/A;    LUNG LOBECTOMY Right 2012    upper lobectomy and lymphadenectomy    MEDIAL BRANCH BLOCK Bilateral 9/29/2023    Procedure: BILATERAL L4-S1 LUMBAR MEDIAL BRANCH BLOCK CPT: 81125, 97984;  Surgeon: Jenny Bauman MD;  Location: [facility];  Service: Pain Management;  Laterality: Bilateral;       Family History: family history includes Brain cancer in her father; Cancer (age of onset: 47) in her mother. Otherwise pertinent FHx was reviewed and not pertinent to current issue.    Social History:  reports that she has been smoking cigarettes. She started smoking about 52 years ago. She has a 26.1 pack-year smoking history. She has been exposed to tobacco smoke. She has never used smokeless tobacco. She reports current alcohol use. She reports current drug use. Drug: Marijuana.    Home Medications:  Umeclidinium-Vilanterol, albuterol sulfate HFA, and celecoxib    Allergies:  Allergies   Allergen Reactions    Codeine Irritability       Objective    Objective     Vitals:   Temp:  [97.3 °F (36.3 °C)] 97.3 °F (36.3 °C)  Heart Rate:  [75] 75  Resp:  [18] 18  BP: (121)/(80) 121/80    Physical Exam  Constitutional:       General: She is not in acute distress.  Pulmonary:      Effort: Pulmonary effort is normal. No respiratory distress.   Skin:     General: Skin is warm and dry.   Neurological:      Mental Status: She is alert.   Psychiatric:         Mood and Affect: Mood normal.         Thought Content: Thought content normal.         Result Review    Result Review:  I have personally reviewed the results from the time of this admission to 3/6/2024 13:21 EST and agree with these findings:  []  Laboratory list / accordion  []  Microbiology  []  Radiology  []  EKG/Telemetry   []  Cardiology/Vascular   []   Pathology  []  Old records  []  Other:  Most notable findings include: No new       Assessment & Plan   Assessment / Plan     Brief Patient Summary:  Yolanda Lee Hatchett is a 63 y.o. female who has chronic low back pain    Active Hospital Problems:  Active Hospital Problems    Diagnosis     Lumbar facet arthropathy     Low back pain      Plan: Lumbar Medial branch blocks #2      DVT prophylaxis:  No DVT prophylaxis order currently exists.      Jenny Bauman MD

## 2024-03-12 ENCOUNTER — HOSPITAL ENCOUNTER (OUTPATIENT)
Facility: HOSPITAL | Age: 64
Discharge: HOME | End: 2024-03-12
Admitting: PHYSICIAN ASSISTANT
Payer: COMMERCIAL

## 2024-03-12 PROCEDURE — 77063 BREAST TOMOSYNTHESIS BI: CPT

## 2024-03-12 PROCEDURE — 77067 SCR MAMMO BI INCL CAD: CPT

## 2024-03-13 NOTE — PROGRESS NOTES
Subjective .  Currently undergoing physical therapy, pain management    REASONS FOR FOLLOWUP:    1. Nonsmall cell lung carcinoma, stage IIA (down staged from presumed stage IIIA). The patient received neoadjuvant chemotherapy with carboplatin/Alimta x2. Followup scans showed considerable response.   2. The patient is status post right upper lobectomy and lymphadenectomy (discharged 03/07/2012).   3. Per Thoracic Conference, additional two cycles of adjuvant carboplatin/Alimta was advised and initiated on 04/11/2012.   4. Patient seen 5/1 and then again 5/21/2012, side effects slowly resolving per carboplatin/Alimta chemotherapy.   5. ? Increasing claudication symptoms left lower extremity, vascular assessment planned.   6. Evaluation per vascular surgery negative, except for bilateral distal carotid stenosis, chest x-ray on 8/27/2012 without new abnormality.   7. Triage visit for evaluation of nail fungus.   8. Patient seen on 2/25/13 with recent chest x-ray from January 2013 with no new findings, evidence of sinopulmonary infection; short course of Augmentin initiated.   9. Patient seen on 7/15 with weight loss noted (?), followup scans to be scheduled. Port maintenance continued.   10. Followup scans were negative for evidence of recurrent disease, weight loss improving. Port to be removed thereafter.   11. The patient was seen on 01/08/2013 with increasing right lower extremity pain, brief interval to prolonged, followup scans scheduled in advance.   12. Follow-up repeat CT scan of chest, abdomen, and pelvis without evidence of recurrent disease, except for enlarging right ovarian/adrenal cystic nodule, ? ovarian cyst, further weight loss noted, plans for repeat thyroid function test analysis, OB/GYN assessment, and initiate nutritional assessment.   13. The patient was seen 05/06/14 with further weight loss (?), assessments to date negative, pulmonary assessment of presumptive COPD.   14. The patient was  Increased microalbumin in urine improving continue losartan  Hemoglobin A1c 7.8 1800-calorie ADA diet strict blood sugar control repeat A1c 3 months  Basic metabolic profile shows chronic kidney disease stage IIIa follow-up with nephrology 08/26/2014, moderate COPD, chronic obstructive pulmonary disease recognized. Long-term steroid treatment plan, orthopedic assessment for right shoulder planned.   15. The patient is status post orthopedic assessment and injection therapy?   16. The patient was seen on 05/13/2015 - recent diagnosis of vertigo, responsive to meclizine, every 6 month followup planned.   17. Patient seen 11/18/2015 with increasing right upper extremity pain, ?, swelling. Follow up CT of the chest planned.    18. Patient assesse d by repeat CT chest, abdomen, and pelvis found to be negative for evidence of recurrent disease,? additional low back assessment ongoing.   19. Patient reviewed November 04, 2016 stable, undergoing therapy and pain management  20. Patient reviewed April 21, 2017, chest x-ray without new abnormality, smoking cessation ongoing-down to 1 pack per week    HISTORY OF PRESENT ILLNESS:  The patient is a 57 y.o. year old female who is here for follow-up with the above-mentioned history.    History of Present Illness       This 57-year-old  female with a history of non-small cell lung carcino ma, having completed both neoadjuvant and then adjuvant chemotherapy by May 2012. She has returned back for routine assessment and Mediport flushes and fortunately has done well up to this point. She did unfortunately recently have evidence of respirator y infection, which has found very difficult to clear and having increasing sinus symptoms. The record does indicate an assessment on 1/14/13 with Dr. Amaro. He found no evidence of recurrent disease. This included a chest x-ray, PA and lateral done on 1 / 14/13 showing good aeration of bilateral lung fields with no infiltrates, effusion, masses or nodules seen. Again, as the patient returns on 2/25/13 she has been having a respiratory infection that was treated thereafter. She was asked to followup and i s now seen on 7/15/13. She had repeat chest x-ray in May 2013  without abnormality.   When seen 07/15/13 she indicated she lost additional weight since last seen and she has been slowly losing weight. She states she feels well with no additional issues, but wonders why she is losing weight and of course is of some concern.   We had followup scans performed 08/06/13 that fortunately show no evidence of neck lymphadenopathy though shotty nodes remain; in the chest there are no suspicious pulmonary opacities or nodules, no pericardial effusion, and resolution of the previously seen pleural thickening in the lateral aspect of the right middle lobe. No change in node in the right hilum with the node measuring 1.3 x 0.8, no mediastinal lymphadenopathy. In the a bdomen there were no suspicious liver lesions. There continue to be two small uterine fibroids, unchanged, and a slight interval increase in a 2.9 cm right ovarian cyst, previously 2.5 cm. The patient has had no clear abnormalities seen on scans. She s tates she is now currently working on a job that unfortunately is not well ventilated, where there is considerable dust. She has had sinus symptoms as a result of this.   The patient thereafter did quite well in fact returned to different job which she is currently is still pursuing. She is active, however, doing quite a bit of labor. She did Tommy shopping and had pain in the right lower extremity with a degree of numbness also recognized. Upon rest this did improve, but it is bothering her as to why it ever developed and also the fact that she has been losing weight without any effort. Fortunately this has not been extreme, but is also concerning.   The patient thereafter was asked to undergo repeat studies and underwent repeat CT scan of chest, abdo men, and pelvis on 01/29/2014. These are fortunately without any evidence of recurrent disease. There is a noncalcified 3 mm, stable pulmonary nodule, stable pleural thickening, and retraction of right apex, stable 13 x 8 mm  right hilar lymph node, findin g s per abdomen and pelvis with a stable 12 mm left adrenal nodule thought to be adenomatous, right renal cortical cyst, retrocrural lymph node that is approximately the same as it was previous, right ovarian/adnexal cystic nodule that is increased in size f rom 2.9 to 3.6. The patient does have some pelvic pain on occasion that may be related to the right ovarian cystic nodule. Otherwise, she feels reasonably good, though she is not sleeping particularly well, only 3 or 4 hours per night. Her appetite is goo d, i.e., stable, 2 meals a day. She is working routinely at a physical job and though once again notes further weight loss since last being.   The patient thereafter was asked return for followup. When seen 5/6 she states that her job has become a bit more physically taxing and she has lost additional weight since last seen, and I do find that her weight is 107.8 pounds compared to 111.6 on 2/5. She is not having pain but has noted increasing shortness of breath with moderate exertion. Her appetite remains good overall. She has evidently not seen OB/GYN at this point.   The patient was referred to pulmonary medicine for their assessment. It was felt she had moderate COPD, had her start ProAir and Symbicort and advised to quit smoking. The patient has been t rying to do the latter with the use of any cigarettes and has made some progress. She has lost, however, a moderate degree of additional weight and has been having some right shoulder pain seen upon repetitive use.   The patient went on to see orthopedics, particularly Dr. Healy. His notes from 09/22/2014 are consistent with moderate degenerative disk disease at C6-C7 with loss of intervertebral height with some osteophytic osteophyte formation. He felt she had a potential radiculopathy as well as right carpal tunnel syndrome. She had improved somewhat on steroids. Additional considerations were cervical traction and/or  additional diagnostic injections.   The patient thereafter states that as she is tapered off steroids used to stimulate her performance status - her pain has unfortunately become worse, but not to the degree previously that she had been experiencing. She indicates that she is considering going back to Dr. Healy for the injections that he had been previously offering.   The patient was asked to be seen again at 6 month followup, seen 05/13/2015. She relates a recent episode of apparent vertigo that was responsive to meclizine. It has not recurred with this medication.   The patient thereafter returns now for review 11/18/2015. 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 She is beginning to have some pain and swelling of her right upper extremity and wonders “exactly what this means” . The swelling increases during the workday into the night and then reduces by the a.m. but “never back to normal”. She also continues to use meclizine for vertigo with this medication helping her particularly at night.   The patient was asked to undergo general reevaluation via CT scanning. This was performed 12/01/2015, demonstrated no evidence of metastatic disease in chest, abdomen or pelvis. As the patient is seen it is further noted that she has no obvious bony abnormalities. She states that through her primary care she is being assessed wi th a lumbar spine MRI since she developed further back pain on top of already noted neck pain. This could well be work related and she plans to see him orthopedist Dr. Healy in the next several weeks as well. He had seen her last fall for neck pain.   The patient's been seeing orthopedics including Dr. Camara.  She is evidently not a surgical candidate and has been referred for pain management with a point of this week already planned.  She states incidentally that her dizziness has improved with the current meclizine  doses     The patient is now reviewed back November 4.  In the interval she's been seen by pain management and also physical therapy and undergoes treatment on a regular basis.  This is been mostly successful.  Her pain, however, is in part related to off includes quite a bit of labor.   Patient's now seen back in April 21, 2017.  She is, fortunately, doing well overall pain control general performance status.  She states, happily, that she is  and doing well.  She is moved to smoking cessation and is down to 1 pack per week.     Past Medical History:   Diagnosis Date   • Asthma    • Bleeding of blood vessel 01/05/2011    BRAIN   • Carpal tunnel syndrome on right    • COPD (chronic obstructive pulmonary disease)     MODERATE   • DDD (degenerative disc disease), cervical 09/22/2014    MODERATE C6-C7, SEEING DR. EWING   • Fibroids     UTERINE X 2   • Hypertension    • MI (myocardial infarction) 06/2009   • Non-small cell carcinoma of lung     Stabe IIA   • Ovarian cyst     RIGHT   • Pain of right upper extremity 11/18/2015   • Pelvic pain    • Right shoulder pain    • SOB (shortness of breath)    • Thyroid nodule    • Vertigo 05/13/2015   • Weight loss        ONCOLOGIC HISTORY:  (History from previous dates can be found in the separate document.)    Current Outpatient Prescriptions on File Prior to Visit   Medication Sig Dispense Refill   • gabapentin (NEURONTIN) 100 MG capsule Take 100 mg by mouth Daily.     • HYDROcodone-acetaminophen (NORCO) 7.5-325 MG per tablet Take 1 tablet by mouth As Needed for moderate pain (4-6).     • meclizine (ANTIVERT) 25 MG tablet Take 1 tablet by mouth Every 8 (Eight) Hours As Needed for dizziness. 60 tablet 1   • [DISCONTINUED] nicotine (NICODERM CQ) 21 MG/24HR patch Place 1 patch on the skin Daily. 30 patch 2     No current facility-administered medications on file prior to visit.        ALLERGIES:     Allergies   Allergen Reactions   • No Known Drug Allergy        Social  "History     Social History   • Marital status:      Spouse name: N/A   • Number of children: N/A   • Years of education: College     Occupational History   •       Social History Main Topics   • Smoking status: Current Every Day Smoker     Packs/day: 1.00     Years: 20.00   • Smokeless tobacco: Current User      Comment: DECREASED ABOUT TO 1/3 PPD ON 4-26-12   • Alcohol use Yes      Comment: OCCASIONAL   • Drug use: Yes   • Sexual activity: Not on file     Other Topics Concern   • Not on file     Social History Narrative         Cancer-related family history includes Brain cancer in her father; Cancer (age of onset: 47) in her mother.     Review of Systems  A comprehensive 14 point review of systems was performed and was negative except as mentioned.    Objective      Vitals:    04/21/17 1103   BP: 102/64   Pulse: 66   Resp: 20   Temp: 98.1 °F (36.7 °C)   TempSrc: Oral   SpO2: 100%   Weight: 127 lb 3.2 oz (57.7 kg)   Height: 63.7\" (161.8 cm)   PainSc: 7  Comment: Back,neck, and shoulder pain   PainLoc: Generalized     Current Status 4/21/2017   ECOG score 0       Physical Exam    GENERAL: Thin appearing  female alert and oriented.   SKIN: Warm, dry without rashes, purpura or petechiae.   HEAD: Normocephalic.   EYES: Pupils equal, round and reactive to light. EOMs intact. Conjunctivae normal.   EARS: Hearing intact.   NOSE: Septum midline. No excoriations or nasal discharge.   MOUTH: Tongue is well-papillated; no stomatitis or ulcers. Lips normal.   THROAT: Oropharynx without lesions or exudates.   NECK: Supple with good range of motion; no thyromegaly or masses, no JVD or bruits.   LYMPHATICS: No cervical, supraclavicular, axillary or inguinal adenopathy.   CHEST: Lungs clear to percussion and auscultation, prolonged expiratory phase noted bilaterally.   CARDIAC: Regular rate and rhythm without murmurs, rubs or gallops.   ABDOMEN: Soft, nontender with no organomegaly or masses. "   EXTREMITIES: No clubbing, cyanosis or edema.   NEUROLOGICAL: No focal neurological deficits.     RECENT LABS:  Hematology WBC   Date Value Ref Range Status   04/21/2017 8.80 4.00 - 10.00 10*3/mm3 Final     RBC   Date Value Ref Range Status   04/21/2017 4.72 3.90 - 5.00 10*6/mm3 Final     Hemoglobin   Date Value Ref Range Status   04/21/2017 13.3 11.5 - 14.9 g/dL Final     Hematocrit   Date Value Ref Range Status   04/21/2017 38.6 34.0 - 45.0 % Final     Platelets   Date Value Ref Range Status   04/21/2017 283 150 - 375 10*3/mm3 Final        Assessment/Plan          A 57-year-old female with history of nonsmall cell lung carcinoma, stag e IIIA versus stage IIIB. She underwent neoadjuvant chemotherapy with excellent response, dropping to a pathologic stage II after surgical resection. She took additional courses of adjuvant carboplatin and Alimta in May of 2012. One year later, she had a d egree of weight loss that did not appear to be related to disease progression. This stabilized, though she had a degree of dizziness and subsequent scans of the brain were negative, ? inner ear issues. This did overall improve. Her weight loss, however, c ontinued and we have had her to undergo repeat scans to be certain whether this was not related to recurrent disease. We did not find recurrence of malignancy either on physical exam or scan. Metabolic studies when last seen included normal thyroid fun c tion, normal serum chemistries, normal renal function, normal LFTs, CEA 5.2. At this point it is uncertain why she has weight loss. She had subsequent pulmonary assessment, however, consistent with chronic obstructive pulmonary disease and when see 08/2 6 /2014, a trial of low-dose steroids were initiated, which were helpful. Orthopedically, she has been reviewed as above through Dr. Healy and she does continue to see him periodically. Now when reviewed on 05/13/2015, she has recently had vertigo. In fa ct, she describes on  "re-exam today, orthostasis. She has adjusted medications to the point that she \"takes hardly anything.\"   We will plan for her to hydrate vigorously on a regular basis, particularly as the temperature continues warmer. We will refill her meclizine as a trial over the next 10-14 days. If she is still having further orthostatic symptoms, she will notify me and we will discuss her case with Cardiology.   Otherwise we will plan to see her in 6 months. Again, she had been reviewed by chest x-ray last visit, 05/13/2015, showing no new abnormalities. Now with increasing pain, however, we request a CT of the chest for more definitive assessment. This will be d one in the next 2-3 weeks, seeing her just after it is completed. She will continue her current medications for pain as needed.   The patient underwent repeat CT of chest and abdomen fortunately showing no evidence of recurrent disease. As she is seen bac k today she is fact feels better per her chest but is having low back pain possibly related to her chronic and repetitive motion in lifting boxes at work.  The patient reviewed her orthopedics and incidentally undergone a chest x-ray also available from late March.  This is negative for any evidence recurrence at least in the chest and additional exams do not show evidence of bony metastasis.  Ms. agreed that she be seen by pain management and reviewed here in approximately 6 months.     As she is now seen November 4 she is improving with pain management and physical therapy.  We'll continue to see her every 6 months.  We had a longer discussion today about smoking cessation which she absolutely must do and is willing to try.  Plan NicoDerm patch trial which will be e- scribed to her pharmacy as well as continued meclizine for when necessary use.    Patient is next reviewed April 21.  She has continued smoking cessation is down to 1 pack per week hopes to discontinue altogether within the next month.  She has " recently  and is very happy about this.  We have discussed continued NicoDerm patch, use of zoster vaccine (I feel the patient's a  candidate), follow-up assessments with primary care and ophthalmology.  We plan to see her on yearly basis with repeat CBC, CMP and chest x-ray                    Cc:  Kelton Gerard MD

## 2024-03-14 ENCOUNTER — OFFICE VISIT (OUTPATIENT)
Dept: PAIN MEDICINE | Facility: CLINIC | Age: 64
End: 2024-03-14
Payer: COMMERCIAL

## 2024-03-14 ENCOUNTER — PREP FOR SURGERY (OUTPATIENT)
Dept: SURGERY | Facility: SURGERY CENTER | Age: 64
End: 2024-03-14
Payer: COMMERCIAL

## 2024-03-14 VITALS
HEIGHT: 62 IN | BODY MASS INDEX: 24.07 KG/M2 | HEART RATE: 69 BPM | OXYGEN SATURATION: 98 % | TEMPERATURE: 97.3 F | SYSTOLIC BLOOD PRESSURE: 159 MMHG | WEIGHT: 130.8 LBS | RESPIRATION RATE: 18 BRPM | DIASTOLIC BLOOD PRESSURE: 88 MMHG

## 2024-03-14 DIAGNOSIS — M54.50 CHRONIC BILATERAL LOW BACK PAIN WITHOUT SCIATICA: Primary | ICD-10-CM

## 2024-03-14 DIAGNOSIS — M54.16 LUMBAR RADICULOPATHY: ICD-10-CM

## 2024-03-14 DIAGNOSIS — M48.062 SPINAL STENOSIS OF LUMBAR REGION WITH NEUROGENIC CLAUDICATION: Primary | ICD-10-CM

## 2024-03-14 DIAGNOSIS — M47.816 LUMBAR FACET ARTHROPATHY: ICD-10-CM

## 2024-03-14 DIAGNOSIS — M54.50 CHRONIC BILATERAL LOW BACK PAIN WITHOUT SCIATICA: ICD-10-CM

## 2024-03-14 DIAGNOSIS — M53.3 SACROILIAC JOINT PAIN: ICD-10-CM

## 2024-03-14 DIAGNOSIS — G89.29 CHRONIC BILATERAL LOW BACK PAIN WITHOUT SCIATICA: Primary | ICD-10-CM

## 2024-03-14 DIAGNOSIS — G89.29 CHRONIC BILATERAL LOW BACK PAIN WITHOUT SCIATICA: ICD-10-CM

## 2024-03-14 PROCEDURE — 99214 OFFICE O/P EST MOD 30 MIN: CPT

## 2024-03-14 RX ORDER — CELECOXIB 200 MG/1
200 CAPSULE ORAL DAILY
Qty: 30 CAPSULE | Refills: 1 | Status: SHIPPED | OUTPATIENT
Start: 2024-03-14

## 2024-03-14 RX ORDER — SODIUM CHLORIDE 0.9 % (FLUSH) 0.9 %
10 SYRINGE (ML) INJECTION EVERY 12 HOURS SCHEDULED
OUTPATIENT
Start: 2024-03-14

## 2024-03-14 RX ORDER — SODIUM CHLORIDE 0.9 % (FLUSH) 0.9 %
10 SYRINGE (ML) INJECTION AS NEEDED
OUTPATIENT
Start: 2024-03-14

## 2024-03-14 NOTE — PROGRESS NOTES
CHIEF COMPLAINT  Back pain    Subjective   Yolanda Lee Hatchett is a 64 y.o. female  who presents to the office for follow-up of procedure.  She completed a Bilateral L4-S1 MBB on  3/6/24 performed by Dr. Bauman for management of back pain. Patient reports 100% relief from the procedure for 3 days. She reports that she was able to be more active following the procedure without experiencing increased pain.     Today pain is 8/10VAS in severity. Pain is located in her low back and radiates down left lateral/posterior thigh into foot. Describes this pain as a nearly continuous aching and burning. Pain is worsened by prolonged standing or sitting, walking long distances, and increased physical activity. Pain improved with rest/reposition, and heat/ice. Patient also self medicates with THC for pain relief. She was prescribed Celebrex 200mg at last office visit and reports that this has been helpful to her pain.     Unable to tolerate Gabapentin - caused vertigo, minimal relief with Meloxicam     History of nonsmall cell lung carcinoma - currently in remission      Procedures:  3/6/24 - Bilateral L4-S1 MBB - 100% relief x 3 days  9/25/23 - Bilateral L4-S1 MBB - 80% relief x 3 days  5/15/23 - L5/S1 LESI - 100% relief x 3 weeks     Back Pain  This is a chronic problem. The current episode started more than 1 year ago. The problem occurs constantly. The problem has been worse since onset. The pain is present in the lumbar spine. The quality of the pain is described as aching and burning. The pain radiates to the left thigh (left lateral/posterior leg - pain radiates into left foot). The pain is at a severity of 8/10. The pain is moderate. The pain is The same all the time. The symptoms are aggravated by sitting, standing and position (walking long distances). Pertinent negatives include no abdominal pain, chest pain, dysuria, fever, headaches, numbness or weakness.     PEG Assessment   What number best describes your pain on  average in the past week?7  What number best describes how, during the past week, pain has interfered with your enjoyment of life?6  What number best describes how, during the past week, pain has interfered with your general activity?  8    Review of Pertinent Medical Data ---  MRI LUMBAR SPINE WITH AND WITHOUT CONTRAST ON 03/24/2023     CLINICAL HISTORY: Patient has history of lung cancer and has back pain,  bilateral lower extremity numbness and weakness.     TECHNIQUE: Sagittal T1, proton density and fat-suppressed T2 and  postcontrast sagittal fat-suppressed T1-weighted images were obtained  lumbar spine. In addition thin cut axial T1-weighted images were  obtained angled through the interspaces from T11 to S1 axial T2 and  postcontrast axial T1-weighted images were obtained from T11 to S1.     This is correlated to prior post myelogram CT lumbar spine from [facility] on 02/12/2016.     FINDINGS: The distal thoracic cord and conus is normal in signal  intensity. The conus terminates at the level of the inferior body of L2  which is borderline but probably lower limits of normal.     At T12-L1 the disc space and facets are normal with no canal or  foraminal narrowing.     At L1-2 the disc space and facets are normal with no canal or foraminal  narrowing.     At L2 there is mild bilateral facet overgrowth, mild disc space  narrowing and degenerative endplate change, 2 mm retrolisthesis of L2 on  L3 and diffuse annular disc bulge and there is mild-to-moderate  narrowing of the thecal sac and there is mild bilateral foraminal  narrowing.     At L3-4 there is mild-to-moderate bilateral facet overgrowth. There is  disc space narrowing and degenerative endplate changes, 3 mm  retrolisthesis of L3 on L4, diffuse annular spurring and bulging disc  material. Prominent posterior epidural fat and a combination of all of  the above findings contributes to severe generalized narrowing of the  thecal sac  diminished spinal fluid bathing the cauda equina at this  level and there is spurring bulging disc material extending into the  neural foramina with mild-to-moderate bilateral foraminal narrowing.     At L4-5 there is mild-to-moderate bilateral facet overgrowth. There is  severe disc space narrowing and there are degenerative endplate changes  diffuse posterior disc osteophyte complex effaces the ventral aspect of  thecal sac and there is prominent posterior epidural fat and there is  severe narrowing of the thecal sac and diminished spinal fluid bathing  the cauda equina at this level. There is some slight narrowing of the  lateral recesses posterior spurs abut the ventral aspect traversing L5  nerve roots. There is spurring into the inferior aspect of the neural  foramina bilaterally with mild-to-moderate right and there is moderate  left bony foraminal narrowing.     At L5-S1 there is mild bilateral facet overgrowth and there is severe  disc space narrowing and there are degenerative endplate changes, 5 mm  retrolisthesis of L5 with respect to S1 with uncovering posterior  inferior aspect of L5-S1 disc space and disc material bulging along the  posterior superior endplate of S1 eccentric right paracentrally where it  protrudes where there is some herniated disc material along the right  posterior superior endplate of S1. There is some narrowing of the thecal  sac. There is slight narrowing lateral recesses. There is spurring into  the foramina bilaterally and there is moderate bilateral bony foraminal  narrowing.     IMPRESSION:  1. The disc space and facets are normal with no canal or foraminal  narrowing at T12-L1 and L1-L2.  2. At L2-3 there is mild bilateral facet overgrowth, disc space  narrowing and degenerative endplate changes and a 3 mm retrolisthesis of  L2 with respect to L3 and diffuse annular disc bulge and posterior  epidural fat contribute to mild-to-moderate narrowing of the thecal sac  and there  is mild bilateral foraminal narrowing.  3. At L3-4 there is mild-to-moderate bilateral facet overgrowth, disc  space narrowing and degenerative endplate change, 3 mm retrolisthesis of  L3 on L4 and there is a diffuse posterior disc osteophyte complex and  prominent posterior epidural fat, the combination of which contributes  to severe narrowing of the thecal sac with diminished spinal fluid  bathing the cauda equina at this level. There is spurring bulging disc  material extending into the neural foramina resulting in  mild-to-moderate bilateral foraminal narrowing.  4. At L4-5 there is mild-to-moderate bilateral facet overgrowth and  there is severe disc space narrowing and degenerative endplate changes  and 2-3 mm retrolisthesis of L4 on L5 and prominent diffuse posterior  disc osteophyte complex and prominent posterior epidural fat and there  is severe narrowing of the thecal sac, diminished spinal fluid bathing  the cauda equina at this level, spurring into the foramina and there is  mild-to-moderate right and moderate left foraminal narrowing.  5. At L5-S1 there is mild bilateral facet overgrowth, disc space  narrowing and degenerative endplate changes 5 mm retrolisthesis of L5  with respect to S1 with uncovering of the posterior inferior aspect of  L5-S1 disc space and disc material diffusely bulging along the posterior  superior endplate of S1. There is eccentric herniated disc material  along the right paracentral superior body and endplate of S1 measuring 8  x 5 mm abuts the anteromedial aspect traversing right S1 nerve root but  does not have mass effect on it or displace the right S1 nerve root.  There is spurring into the foramina and there is moderate bilateral  foraminal narrowing.     This report was finalized on 3/24/2023 1:53 PM by Dr. Cody Mckeon M.D.    The following portions of the patient's history were reviewed and updated as appropriate: allergies, current medications, past family history,  "past medical history, past social history, past surgical history, and problem list.    Review of Systems   Constitutional:  Negative for fever.   Cardiovascular:  Negative for chest pain.   Gastrointestinal:  Negative for abdominal pain, constipation and diarrhea.   Genitourinary:  Positive for difficulty urinating. Negative for dysuria.   Musculoskeletal:  Positive for back pain.   Neurological:  Negative for weakness, numbness and headaches.   Psychiatric/Behavioral:  Negative for sleep disturbance and suicidal ideas. The patient is not nervous/anxious.      I have reviewed and confirmed the accuracy of the ROS as documented by the MA/LPN/RN NE Narvaez     Vitals:    03/14/24 1456   BP: 159/88   Pulse: 69   Resp: 18   Temp: 97.3 °F (36.3 °C)   SpO2: 98%   Weight: 59.3 kg (130 lb 12.8 oz)   Height: 157.5 cm (62\")   PainSc:   8   PainLoc: Back     Objective   Physical Exam  Constitutional:       Appearance: Normal appearance.   HENT:      Head: Normocephalic.   Cardiovascular:      Rate and Rhythm: Normal rate and regular rhythm.   Pulmonary:      Effort: Pulmonary effort is normal.      Breath sounds: Normal breath sounds.   Musculoskeletal:      Cervical back: Normal range of motion.      Lumbar back: Tenderness and bony tenderness present. No spasms. Decreased range of motion. Positive left straight leg raise test. Negative right straight leg raise test.      Comments: + lumbar facet loading/tenderness   Skin:     General: Skin is warm and dry.      Capillary Refill: Capillary refill takes less than 2 seconds.   Neurological:      General: No focal deficit present.      Mental Status: She is alert and oriented to person, place, and time.   Psychiatric:         Mood and Affect: Mood normal.         Behavior: Behavior normal.         Thought Content: Thought content normal.         Cognition and Memory: Cognition normal.       Assessment & Plan   Diagnoses and all orders for this visit:    1. Spinal " "stenosis of lumbar region with neurogenic claudication (Primary)    2. Chronic bilateral low back pain without sciatica  -     celecoxib (CeleBREX) 200 MG capsule; Take 1 capsule by mouth Daily.  Dispense: 30 capsule; Refill: 1    3. Sacroiliac joint pain    4. Lumbar facet arthropathy    5. Lumbar radiculopathy      Yolanda Lee Hatchett reports a pain score of 8.  Given her pain assessment as noted, treatment options were discussed and the following options were decided upon as a follow-up plan to address the patient's pain: continuation of current treatment plan for pain.    --- Bilateral L4-S1 RFA  -------  Education about Medial Branch Blockade and RF Therapy:    This medial branch blockade (MBB) suggested is intended for diagnostic purposes, with the intent of offering the patient Radiofrequency thermal rhizotomy (RF) if the MBB is diagnostically effective.  The diagnostic blockade is necessary to determine the likelihood that RF therapy could be efficacious in providing long term relief to the patient.    Medial branches are sensory nerve branches that connect to a facet joint and transmit sensations & pain signals from that joint.  Facet is a term for the type of joints found in the spine.  Medial branches are the nerves that go to a facet, and therefore are also sometimes called \"facet joint nerves\" (FJNs).      In a medial branch blockade procedure, xray fluoroscopy is used to verify the locations of the outside of the joint lines which are being targeted.  Under xray guidance, needles are placed to these areas.  Contrast dye is injected to confirm proper placement, with dye flowing over the joint area, and to ensure that the dye does not flow into unintended areas such as a vein.  When this is confirmed, local anesthetic is injected to block the medial branch at that joint level.      If MBBs are diagnostically successful in blocking pain, then the patient is most likely a great candidate for " Radiofrequency of those facet joint nerves.  In the RF procedure, needles are placed to the joint lines in the same fashion, and after testing, the needle tips are heated to thermally treat the nerves, blocking the nerves by in essence damaging the nerves with the heat treatment(non-pulsed).       Medically, a successful RF procedure should provide a patient with 50% pain relief or more for at least 6 months.  Clinical experience suggests that successful patients receive relief more in the range of 12 months on average.  We also discussed that a fortunate minority of patients receive therapeutic success from the MBB, and may not require RF ablation.  If a patient receives more than 8 weeks of relief from MBB, then occasional repeat MBB for therapeutic purposes is a very reasonable alternative therapy.  This course of therapy is consistent with our LCDs according to our CMS  in the area, and therefore other insurance providers should follow accordingly.  We will monitor our patients to screen for these therapeutic responders and will offer RF therapy only when necessary.      We discussed that MBB & RF are not without risks.  Guidelines regarding anticoagulant use & neuraxial procedures will be respected.  Patients that are ill or otherwise may be at risk for sepsis will not have their spines accessed by neuraxial injections of any type.  This patient will not be offered these therapies if there is an increased risk.   We discussed that there is a risk of postprocedural pain and also a risk of worsening of clinical picture with these procedures as with any neuraxial procedure.    -------  Discussed with the patient that sedation is optional for this procedure.  The sedation offered is called conscious sedation which is different from general anesthesia that is utilized in surgical procedures. The dosing of the sedation is determined by the physician and they will be monitored throughout the procedure.  With conscious sedation it is possible to remember parts or all of the procedure, this is normal. They will need to have a  with them as driving is prohibited following conscious sedation.      NPO instructions for conscious sedation:  --- Do not eat 6 hours prior to the procedure.   --- Do not drink any dairy or citrus 4 hours prior to the procedure.   --- Do not drink anything, including clear liquids, 2 hours prior to procedure.      If the NPO instructions are not followed then the procedure may be performed without sedation or the procedure will need to be rescheduled.    --- Continue Celebrex. Refill sent to pharmacy.  --- Follow-up for procedure    Thermal Radiofrequency Destruction  This initial thermal radiofrequency destruction (RFA) of cervical, thoracic, or lumbar paravertebral facet joint (medial branch) nerves is considered medically reasonable and necessary as this patient has met the criteria of having at least two (2) medically reasonable and necessary diagnostic MBBs, with each one providing a consistent minimum of 80% sustained relief of primary (index) pain (with the duration of relief being consistent with the agent used).     DEEPTI REPORT  As the clinician, I personally reviewed the DEEPTI from 3/14/24 while the patient was in the office today.    Dictated utilizing Dragon dictation.

## 2024-03-19 ENCOUNTER — TRANSCRIBE ORDERS (OUTPATIENT)
Dept: SURGERY | Facility: SURGERY CENTER | Age: 64
End: 2024-03-19
Payer: COMMERCIAL

## 2024-03-19 DIAGNOSIS — Z41.9 SURGERY, ELECTIVE: Primary | ICD-10-CM

## 2024-04-11 NOTE — DISCHARGE INSTRUCTIONS
[facility] - Post-procedure Instructions          --  While there are no absolute restrictions, it is recommended that you do not perform strenuous activity today. In the morning, you may resume your level of activity as before your block.    --  If you have a band-aid at your injection site, please remove it later today. Observe the area for any redness, swelling, pus-like drainage, or a temperature over 101°. If any of these symptoms occur, please call your doctor at [phone]. If after office hours, leave a message and the on-call provider will return your call.    --  Ice may be applied to your injection site. It is recommended you avoid direct heat (heating pad; hot tub) for 1-2 days.    --  Call [facility] at [phone] if you experience persistent headache, persistent bleeding from the injection site, or severe pain not relieved by heat or oral medication.    --  Do not make important decisions today.    --  Due to the effects of the block and/or the I.V. Sedation, DO NOT drive or operate hazardous machinery for 12 hours.  Local anesthetics may cause numbness after procedure and precautions must be taken with regards to operating equipment as well as with walking, even if ambulating with assistance of another person or with an assistive device.    --  Do not drink alcohol for 12 hours.    -- You may return to work tomorrow, or as directed by your referring doctor.    --  Occasionally you may notice a slight increase in your pain after the procedure. This should start to improve within the next 24-48 hours. Radiofrequency ablation procedure pain may last 3-4 weeks.    --  It may take as long as 3-4 days before you notice a gradual improvement in your pain and/or other symptoms.    -- You may continue to take your prescribed pain medication as needed.    --  Some normal possible side effects of steroid use could include fluid retention, increased blood sugar, dull headache,  "increased sweating, increased appetite, mood swings and flushing.    --  Diabetics are recommended to watch their blood glucose level closely for 24-48 hours after the injection.    --  Must stay in PACU for 20 min upon arrival and prove no leg weakness before being discharged.    --  IN THE EVENT OF A LIFE THREATENING EMERGENCY, (CHEST PAIN, BREATHING DIFFICULTIES, PARALYSIS…) YOU SHOULD GO TO YOUR NEAREST EMERGENCY ROOM.    --  You should be contacted by our office within 2-3 days to schedule follow up or next appointment date.  If not contacted within 7 days, please call the office at [phone]     Radiofrequency ablation (RFA):     - Radiofrequency ablation is a term used to describe cauterization or \"burning.\"   - In pain management, we can use this technique with a special needle to target and destroy areas that are causing your pain.   - In most cases, you must have TWO successful \"test injections\" before you are a candidate for RFA.    After your RFA:   - Because heat is used in this technique, it is common to have soreness after the procedure.  Sometimes \"neuritis\" occurs, which feels like tingling, prickly, or sunburn under the skin sensations.   - Ice packs are helpful in decreasing this soreness and preventing post-procedure \"neuritis\" pain.  Use an ice pack for 20 minutes at a time at least 3 times the day of and the day after your procedure.   - It is common to have arm/leg numbness or weakness the day of your procedure, but this should wear off by the following day.   - It may take up to 6 weeks to gain full benefit from this procedure.   "

## 2024-04-11 NOTE — H&P
[facility]   HISTORY AND PHYSICAL    Patient Name: Yolanda Lee Hatchett  : 1960  MRN: [id number]  Primary Care Physician:  Jovany Wiley MD  Date of admission: 4/15/2024    Subjective   Subjective     Chief Complaint: Low back pain    History of Present Illness  Chronic low back pain that is axial in nature.  She had relief from diagnostic lumbar medial branch blocks x 2.      Review of Systems   Constitutional:  Negative for chills and fever.   Respiratory:  Negative for cough and shortness of breath.    Musculoskeletal:  Positive for back pain.        Personal History     Past Medical History:   Diagnosis Date    Asthma     Bleeding of blood vessel 2011    BRAIN    Carpal tunnel syndrome on right     COPD (chronic obstructive pulmonary disease)     MODERATE    DDD (degenerative disc disease), cervical 2014    MODERATE C6-C7, SEEING DR. EWING    Drug therapy     lung ca    Emphysema of lung     Fibroids     UTERINE X 2    Hx of radiation therapy     lung ca    Lung cancer     MI (myocardial infarction) 2009    Mitral insufficiency     Non-small cell carcinoma of lung     Stabe IIA    Ovarian cyst     RIGHT    Pain of right upper extremity 2015    Pelvic pain     Positive colorectal cancer screening using Cologuard test     Right shoulder pain     SOB (shortness of breath)     Thyroid nodule     Vertigo 2015    Weight loss        Past Surgical History:   Procedure Laterality Date    APPENDECTOMY      CATARACT EXTRACTION, BILATERAL  2019    COLONOSCOPY N/A 1/10/2023    Procedure: COLONOSCOPY TO CECUM AND TERMINAL ILEUM WITH COLD SNARE POLYPECTOMIES;  Surgeon: Maribell Sam MD;  Location: [facility] ENDOSCOPY;  Service: Gastroenterology;  Laterality: N/A;  PRE- POSITIVE COLOGUARD  POST- COLON POLYPS, HEMORRHOIDS    EPIDURAL Left 2023    Procedure: LEFT L5 & S1 TRANSFORAMINAL LUMBAR EPIDURAL STEROID INJECTION  CPT: 98994,58435;  Surgeon: Jenny Bauman MD;  Location:  [facility];  Service: Pain Management;  Laterality: Left;    LAPAROSCOPIC TUBAL LIGATION      LUMBAR EPIDURAL INJECTION N/A 5/15/2023    Procedure: LUMBAR EPIDURAL 1ST VISIT L5-S1 87005;  Surgeon: Jenny Bauman MD;  Location: [facility];  Service: Pain Management;  Laterality: N/A;    LUNG LOBECTOMY Right 2012    upper lobectomy and lymphadenectomy    MEDIAL BRANCH BLOCK Bilateral 9/29/2023    Procedure: BILATERAL L4-S1 LUMBAR MEDIAL BRANCH BLOCK CPT: 40602, 59097;  Surgeon: Jenny Bauman MD;  Location: [facility];  Service: Pain Management;  Laterality: Bilateral;    MEDIAL BRANCH BLOCK Bilateral 3/6/2024    Procedure: BILATERAL L4-S1 LUMBAR MEDIAL BRANCH BLOCK CPT: 03100, 04219;  Surgeon: Jenny Bauman MD;  Location: [facility];  Service: Pain Management;  Laterality: Bilateral;       Family History: family history includes Brain cancer in her father; Cancer (age of onset: 47) in her mother. Otherwise pertinent FHx was reviewed and not pertinent to current issue.    Social History:  reports that she has been smoking cigarettes. She started smoking about 52 years ago. She has a 26.1 pack-year smoking history. She has been exposed to tobacco smoke. She has never used smokeless tobacco. She reports current alcohol use. She reports current drug use. Drug: Marijuana.    Home Medications:  Umeclidinium-Vilanterol, albuterol sulfate HFA, and celecoxib    Allergies:  Allergies   Allergen Reactions    Codeine Irritability       Objective    Objective     Vitals:        Physical Exam  Constitutional:       General: She is not in acute distress.  Pulmonary:      Effort: Pulmonary effort is normal. No respiratory distress.   Skin:     General: Skin is warm and dry.   Neurological:      Mental Status: She is alert.   Psychiatric:         Mood and Affect: Mood normal.         Thought Content: Thought content normal.         Result Review    Result Review:  I have personally reviewed the results from the  time of this admission to 4/15/2024 13:57 EDT and agree with these findings:  []  Laboratory list / accordion  []  Microbiology  []  Radiology  []  EKG/Telemetry   []  Cardiology/Vascular   []  Pathology  []  Old records  []  Other:  Most notable findings include: No new      Assessment & Plan   Assessment / Plan     Brief Patient Summary:  Yolanda Lee Hatchett is a 64 y.o. female who has chronic axial low back pain    Active Hospital Problems:  Active Hospital Problems    Diagnosis     Lumbar facet arthropathy     Low back pain      Plan: Lumbar radiofrequency ablation      DVT prophylaxis:  No DVT prophylaxis order currently exists.      Jenny Bauman MD

## 2024-04-15 ENCOUNTER — HOSPITAL ENCOUNTER (OUTPATIENT)
Dept: GENERAL RADIOLOGY | Facility: SURGERY CENTER | Age: 64
Setting detail: HOSPITAL OUTPATIENT SURGERY
End: 2024-04-15
Payer: COMMERCIAL

## 2024-04-15 ENCOUNTER — HOSPITAL ENCOUNTER (OUTPATIENT)
Facility: SURGERY CENTER | Age: 64
Setting detail: HOSPITAL OUTPATIENT SURGERY
Discharge: HOME | End: 2024-04-15
Attending: ANESTHESIOLOGY | Admitting: ANESTHESIOLOGY
Payer: COMMERCIAL

## 2024-04-15 VITALS
DIASTOLIC BLOOD PRESSURE: 98 MMHG | HEART RATE: 63 BPM | OXYGEN SATURATION: 97 % | HEIGHT: 62 IN | TEMPERATURE: 97.3 F | BODY MASS INDEX: 23.92 KG/M2 | SYSTOLIC BLOOD PRESSURE: 178 MMHG | WEIGHT: 130 LBS | RESPIRATION RATE: 16 BRPM

## 2024-04-15 DIAGNOSIS — Z41.9 SURGERY, ELECTIVE: ICD-10-CM

## 2024-04-15 DIAGNOSIS — M54.50 CHRONIC BILATERAL LOW BACK PAIN WITHOUT SCIATICA: ICD-10-CM

## 2024-04-15 DIAGNOSIS — M47.816 LUMBAR FACET ARTHROPATHY: ICD-10-CM

## 2024-04-15 DIAGNOSIS — G89.29 CHRONIC BILATERAL LOW BACK PAIN WITHOUT SCIATICA: ICD-10-CM

## 2024-04-15 PROCEDURE — 64636 DESTROY L/S FACET JNT ADDL: CPT | Performed by: ANESTHESIOLOGY

## 2024-04-15 PROCEDURE — 64635 DESTROY LUMB/SAC FACET JNT: CPT | Performed by: ANESTHESIOLOGY

## 2024-04-15 PROCEDURE — 76000 FLUOROSCOPY <1 HR PHYS/QHP: CPT

## 2024-04-15 PROCEDURE — 25010000002 FENTANYL CITRATE (PF) 50 MCG/ML SOLUTION: Performed by: ANESTHESIOLOGY

## 2024-04-15 PROCEDURE — 25010000002 BUPIVACAINE (PF) 0.25 % SOLUTION 10 ML VIAL: Performed by: ANESTHESIOLOGY

## 2024-04-15 PROCEDURE — 25010000002 MIDAZOLAM PER 1MG: Performed by: ANESTHESIOLOGY

## 2024-04-15 RX ORDER — SODIUM CHLORIDE 0.9 % (FLUSH) 0.9 %
10 SYRINGE (ML) INJECTION AS NEEDED
Status: DISCONTINUED | OUTPATIENT
Start: 2024-04-15 | End: 2024-04-15 | Stop reason: HOSPADM

## 2024-04-15 RX ORDER — MIDAZOLAM HYDROCHLORIDE 1 MG/ML
1 INJECTION INTRAMUSCULAR; INTRAVENOUS ONCE
Status: COMPLETED | OUTPATIENT
Start: 2024-04-15 | End: 2024-04-15

## 2024-04-15 RX ORDER — SODIUM CHLORIDE 0.9 % (FLUSH) 0.9 %
10 SYRINGE (ML) INJECTION EVERY 12 HOURS SCHEDULED
Status: DISCONTINUED | OUTPATIENT
Start: 2024-04-15 | End: 2024-04-15 | Stop reason: HOSPADM

## 2024-04-15 RX ORDER — FENTANYL CITRATE 50 UG/ML
50 INJECTION, SOLUTION INTRAMUSCULAR; INTRAVENOUS ONCE
Status: COMPLETED | OUTPATIENT
Start: 2024-04-15 | End: 2024-04-15

## 2024-04-15 RX ADMIN — MIDAZOLAM HYDROCHLORIDE 1 MG: 2 INJECTION, SOLUTION INTRAMUSCULAR; INTRAVENOUS at 14:59

## 2024-04-15 RX ADMIN — FENTANYL CITRATE 50 MCG: 0.05 INJECTION, SOLUTION INTRAMUSCULAR; INTRAVENOUS at 14:59

## 2024-04-15 NOTE — OP NOTE
Radiofrequency ablation of bilateral L4-S1  [facility]    PREOPERATIVE DIAGNOSIS:  Lumbar spondylosis without myelopathy   POSTOPERATIVE DIAGNOSIS:  Lumbar spondylosis without myelopathy     PROCEDURES PERFORMED:    Bilateral   lumbar radiofrequency ablation of the medial branches at the transverse processes of L4, L5, and the sacral alar groove to thermally treat these facet joints.  1.  14143 -50 Lumbar radiofrequency ablation 1st level.  2.  27509 -50 Lumbar radiofrequency ablation 2nd level.    INFORMED CONSENT:  In preprocedure discussion with the patient, the risks and complications were discussed prior to starting the procedure and informed consent was obtained.     SURGEON:   Jenny Bauman MD    INDICATIONS:  The patient presents with chronic lower back pain. The patient underwent 2 lumbar medial branch blocks with diagnostically positive relief. Given the patient’s significant pain relief, it is diagnostic that we have likely found the source of the patient’s pain; therefore, lumbar radiofrequency ablation has been indicated.     SEDATION:  Anxiolysis was used for this procedure which included Versed 1mg & Fentanyl 50mcg    TIME OF PROCEDURE:  The Interoperative procedure time, after administration of the IV sedative, was 8675-6067 minutes.    ANESTHETIC:  Lidocaine 1% for skin infiltration, 2% lidocaine and 0.25% bupivacaine for injection.    STEROID:  None.    DESCRIPTION OF PROCEDURE:  After obtaining informed consent an IV was  started in the preoperative area. The patient was taken to the operating room. The patient was placed in prone position with a pillow under the abdomen. All pressure points were padded. Heart rate, blood pressure, and pulse oximetry were monitored. The patient was  sedated. The lumbosacral area was prepped with ChloraPrep and draped in a sterile fashion.     The junction of the right S1 superior articular process and sacral ala was identified in a AP  fluoroscopic view. The skin and subcutaneous tissue inferior to the junction was anesthetized with 1% lidocaine. A 20-gauge 100mm RF Davis needle was then advanced percutaneously through the anesthetized skin tract under fluoroscopic guidance until the non-insulated portion of the needle lie at the junction.  The image was then obliqued towards the right side to maximize visualization of the junctions of the L4, L5 superior articular processes with the transverse processes.  Needle placement was performed as described above until the non-insulated portion of the needles lie at the targeted junctions.  All needle tips were confirmed to be posterior to the neural foramen in the lateral fluoroscopic view. Sensory stimulation was then performed with good stimulation of the back at 0.5V or less at each level. Motor stimulation was performed up to 1.5V at each level producing stimulation of the multifidus muscles of the back and no stimulation of the lower extremity at any level. Each level was then anesthetized with 2% lidocaine prior to treatment with pulsed radiofrequency thermocoagulation at 42 degrees Celsius. Each level was then treated with thermal radiofrequency thermocoagulation at 80 degrees Celsius for 60 seconds in two separate cycles.  Prior to the removal of each needle, a volume of 1 mL of injectate was administered at each site.  The total volume consisted of 1mL of 2% lidocaine and 1mL of 0.25% bupivacaine.    The same procedure was then performed on the contralateral side in the exact same fashion.      ESTIMATED BLOOD LOSS:  Minimal.    SPECIMENS:  None.    COMPLICATIONS:  None.    TOLERANCE AND DISCHARGE:  The patient tolerated the procedure well. The patient was transported to the recovery area without difficulties. The patient was discharged home under the care of family in stable and satisfactory condition.    PLAN:  1.  The patient was given our standard instruction sheet.  2.  The patient will  resume all medications per the medication reconciliation sheet.  3.  The patient will Return to clinic 4-6 wks.

## 2024-04-29 ENCOUNTER — TELEPHONE (OUTPATIENT)
Dept: FAMILY MEDICINE CLINIC | Facility: CLINIC | Age: 64
End: 2024-04-29
Payer: COMMERCIAL

## 2024-05-19 NOTE — PROGRESS NOTES
Subjective .    REASONS FOR FOLLOWUP:    1. Nonsmall cell lung carcinoma, stage IIA (down staged from presumed stage IIIA). The patient received neoadjuvant chemotherapy with carboplatin/Alimta x2. Followup scans showed considerable response. The patient is status post right upper lobectomy and lymphadenectomy (discharged 03/07/2012). Per Thoracic Conference, additional two cycles of adjuvant carboplatin/Alimta was advised and initiated on 04/11/2012. She has since remained in remission.     2.  Chest x-ray February 2004, July 20 2020-left hilar nodule 10 mm reduced to 6 mm    3.  Low-dose CT scan without evidence of recurrent disease 5/17/2021    4.  Patient seen 5/9/2022 with bilateral hip pain, abdominal swelling    5.  Patient assessed by CT chest and pelvis at [facility] 5/24/2022, pulmonary nodule, bilateral adrenal nodules, follow-up scans in 3 months planned, smoking cessation planned    6.  Patient reassessed CT chest abdomen pelvis 11/1/2022-no interval change with metastatic disease in chest abdomen pelvis.  Patient proceeding to smoking cessation.    7.  Patient seen 2/23/2023, primary care assessment with ultrasound of thyroid, MRI lumbar spine.  Patient status post endoscopy with polyps removed.    8.  Patient seen 9/7/2023 with pain management continuing, restart of additional anti-inflammatory.  No evidence of metastatic disease    9.  Insurance reinstated, rereferral back to pain management 1/15/2024    10.  Plans for annual branch block and RFA therapy 3/6/2024            History of Present Illness    The patient is a 64 y.o. female with the above-mentioned history, who returns the office today for 3-month follow-up.  She remains in observation in regards to her malignancy.  She did undergo surveillance CT scan July 2019 with a left lower lobe nodule, 1.1 cm noted.  She then underwent PET scan 7/24/2019 with no metabolic activity identified.   The patient reports today she overall  feels very well.  She does have some chronic shortness of breath on exertion though recovers quickly with rest.  She has been exercising and changed her diet, therefore has lost some weight.  She continues to have a chronic cough with occasional productive clear sputum.  She is attempting to stop smoking, though continues to smoke 1 pack/day.  She was recently prescribed Chantix by pulmonology.  She denies any new pain.  The patient is next seen January 2, 2020.  Unfortunately she is no longer covered by insurance though she remains, understandably, concerned about a previously noted left lower lobe pulmonary nodule.  A follow-up scan was scheduled though she is not able to cover that currently financially.      Plans were made for reassessment and the patient is next seen February 12, 2020 with repeat chest x-ray showing again a 1 cm lung nodule projecting just lateral to the left hilum between the level of the posterior left eighth and ninth ribs.  Fortunately she has not further symptomatic at this point additionally.  A follow-up chest x-ray was requested and obtained July 20, 2020 fortunately demonstrating a reduction in the left hilar nodule from 10 mm now to 6 mm.  She continues to have ongoing back pain indicating that insurance coverage has lapsed.        We elected to obtain a follow-up chest x-ray the patient is seen March 1, 2021.  Fortunately she is feeling well and repeat chest x-ray February 22, 2021 shows pulmonary hyperexpansion.  The previously noted lung nodules not noted left lower lobe or other suspicious lesion.  We have discussed follow-up low-dose CT scanning.    The patient underwent low-dose CT scan 5/17/2021 showing stable fairly dense pulmonary nodule left lower lobe that was thought to be granulomatous.  There were no other findings that might be suspicious for malignancy.  Patient, fortunately is feeling fair except for chronic back pain.  This is being assessed by her primary care  physician with additional films.    The patient is reassessed 11/7/2021.  Mammographic screening 10/11/2021 with a negative, chest x-ray was performed 11/6/2021 and is negative for new abnormalities.  Patient states that Dr. Wiley  MRI of the lumbar spine is reviewed result the patient's current low back pain    The patient is next seen, ever, 5/9/2022 indicating that she has been having increasing hip pain as well as abdominal swelling.  Her primary care had endeavor to have her undergo repeat scans that are not covered at [facility] and thus we will try to schedule as an outpatient at a [facility] facility.    Patient had her scans performed at [facility] CT abdomen pelvis 5/24/2022 demonstrating indeterminate 8 x 9 mm left lower lobe pulmonary nodule, indeterminate bilateral adrenal gland nodules measuring up to 1.4 cm.  There is no comparison studies done on these examinations.    The patient is seen 6/13/2022 discussed that she had previous scans 2015 showing a left adrenal adenoma but no abnormalities in the right adrenal gland.  Follow-up studies scheduled 3 months intervals [place].  Smoking cessation plan and pulmonary referral planned.    Patient seen by pulmonary medicine 6/17/2022-COPD with mild exacerbation noted, Chantix trial.    The patient is next assessed 11/9/2022 with CT chest abdomen pelvis reviewed showing no clear evidence of metastatic disease to the chest or pelvis, status post right upper lobectomy, 9 mm nodule superior segment left lower lobe without change, new onset metastatic disease in abdomen pelvis with 2 splenic cysts that are new, bilateral adrenal nodules without change.  The patient is seen formally 11/9/2022 fortunately feeling reasonably well though still having extremity pain that may be peripheral vascular disease and she is urged strongly to discontinue smoking altogether at this point.  She is proceeding through nicotine lozenges.  Fortunately her scans, as  above, do not demonstrate progressive malignancy.  She does describe a positive Cologuard exam however and has been urged to proceed to colonoscopy which is now being scheduled.    The patient is next assessed 2/23/2023 having issues with back pain leading to primary care to assess an MRI of the lumbar spine now pending as well as ultrasound of thyroid after further thyromegaly noted, normal TFTs.  Notably she proceeded through colonoscopy with 2 polyps removed 1/10/2023-tubular adenoma in ascending colon, hyperplastic polyp in the rectum.    Subsequent MRI testing 3/24/2023 demonstrated L2 with respect L3 diffuse annular disc bulge and posterior epidural fat contributing to moderate moderate narrowing of thecal sac, L3-L4 with mild to moderate bilateral facet overgrowth and diffuse posterior disc osteophyte complex.  Similar findings L4-5, L5 to/1 with moderate foraminal narrowing.  Patient felt to be candidate for epidural steroid injections and medial branch blockade now also anticipated.  There is, fortunately, no evidence of metastatic disease present though the patient is quite symptomatic per back pain.    The patient did undergo therapy through pain management to modest improvement, loss of insurance just prior to her last trial.  Her insurance has now been reinstated when she is seen 1/15/2024 and her back pain persists.  We have discussed repeat pain management assessment at this point.      Patient assessed 2/26/2024 with plans for annual branch block and RFA therapy 3/6/2024    Unfortunately the patient underwent further procedures with pain management on 4/15/2024, and she feels that her pain actually worsened following this procedure.  She states that she will not undergo any further procedures with pain management will continue on oral pain medication instead.  She feels that her pain is relatively controlled with this.  She continues with chronic cough, continues smoking.  She denies any new bone  pain.  She denies shortness of breath.  Aside from her pain, she has no new concerns today.    Past Medical History:   Diagnosis Date    Asthma     Bleeding of blood vessel 01/05/2011    BRAIN    Carpal tunnel syndrome on right     COPD (chronic obstructive pulmonary disease)     MODERATE    DDD (degenerative disc disease), cervical 09/22/2014    MODERATE C6-C7, SEEING DR. EWING    Drug therapy     lung ca    Emphysema of lung     Fibroids     UTERINE X 2    Hx of radiation therapy     lung ca    Lung cancer     MI (myocardial infarction) 06/2009    Mitral insufficiency     Non-small cell carcinoma of lung     Stabe IIA    Ovarian cyst     RIGHT    Pain of right upper extremity 11/18/2015    Pelvic pain     Positive colorectal cancer screening using Cologuard test     Right shoulder pain     SOB (shortness of breath)     Thyroid nodule     Vertigo 05/13/2015    Weight loss        ONCOLOGIC HISTORY:  (History from previous dates can be found in the separate document.)    Current Outpatient Medications on File Prior to Visit   Medication Sig Dispense Refill    albuterol sulfate HFA (Ventolin HFA) 108 (90 Base) MCG/ACT inhaler Inhale 2 puffs Every 4 (Four) Hours As Needed for Wheezing. 17 g 3    Anoro Ellipta 62.5-25 MCG/ACT aerosol powder  inhaler 1 puff As Needed.      celecoxib (CeleBREX) 200 MG capsule Take 1 capsule by mouth Daily. 30 capsule 1     No current facility-administered medications on file prior to visit.       ALLERGIES:     Allergies   Allergen Reactions    Codeine Irritability       Social History     Socioeconomic History    Marital status:     Years of education: College   Tobacco Use    Smoking status: Every Day     Current packs/day: 0.50     Average packs/day: 0.5 packs/day for 52.4 years (26.2 ttl pk-yrs)     Types: Cigarettes     Start date: 1972     Passive exposure: Current    Smokeless tobacco: Never    Tobacco comments:     11/28/22 - reports 3 cigs/day + 3 lozenges/day   Vaping  "Use    Vaping status: Never Used   Substance and Sexual Activity    Alcohol use: Yes     Comment: OCCASIONAL    Drug use: Yes     Types: Marijuana    Sexual activity: Not Currently     Partners: Male     Birth control/protection: Post-menopausal         Cancer-related family history includes Brain cancer in her father; Cancer (age of onset: 47) in her mother. There is no history of Breast cancer.      Review of Systems   Constitutional:  Negative for chills, fatigue and fever.   HENT:  Negative for mouth sores and sore throat.    Eyes:  Negative for visual disturbance.   Respiratory:  Negative for cough, chest tightness, shortness of breath (chronic unchanged) and wheezing.    Cardiovascular:  Negative for chest pain and leg swelling.   Gastrointestinal:  Negative for abdominal pain, constipation and vomiting.   Genitourinary:  Negative for dysuria and frequency.   Musculoskeletal:  Positive for back pain, joint swelling, myalgias and neck pain.   Neurological:  Positive for numbness. Negative for dizziness and weakness.   Hematological:  Does not bruise/bleed easily.   Psychiatric/Behavioral:  The patient is not nervous/anxious.    All other systems reviewed and are negative.  Review of systems 09/18/2019  unchanged from previous office visit except as updated.       Objective      Vitals:    05/20/24 1600 05/20/24 1634   BP: (!) 181/73 148/88  Comment: manual by NP   Pulse: 67    Resp: 18    Temp: 98 °F (36.7 °C)    TempSrc: Oral    SpO2: 98%    Weight: 57.8 kg (127 lb 8 oz)    Height: 157.5 cm (62.01\")    PainSc:   8    PainLoc: Back                5/20/2024     3:59 PM   Current Status   ECOG score 0       Physical Exam    GENERAL:  female alert and oriented.   SKIN: Warm, dry without rashes, purpura or petechiae.   HEAD: Normocephalic.   EYES: Pupils equal, round and reactive to light. EOMs intact. Conjunctivae normal.   EARS: Hearing intact.   NOSE: Septum midline. No excoriations or nasal " discharge.   MOUTH: Tongue is well-papillated; no stomatitis or ulcers. Lips normal.   THROAT: Oropharynx without lesions or exudates.   NECK: Supple with good range of motion; progressive thyromegaly noted, no JVD or bruits.   LYMPHATICS: No cervical, supraclavicular adenopathy.   CHEST: Lungs clear to auscultation, prolonged expiratory phase noted bilaterally.   CARDIAC: Regular rate and rhythm without murmurs, rubs or gallops. Bowel sounds present.  ABDOMEN: Soft, nontender with no organomegaly or masses.   EXTREMITIES: No clubbing, cyanosis or edema.   NEUROLOGICAL: No focal neurological deficits. .    RECENT LABS:  Results from last 7 days   Lab Units 05/20/24  1553   WBC 10*3/mm3 6.22   NEUTROS ABS 10*3/mm3 2.76   HEMOGLOBIN g/dL 13.7   HEMATOCRIT % 41.1   PLATELETS 10*3/mm3 276            SCANNED - IMAGING (05/24/2022)      Assessment plan;        1. Nonsmall cell lung carcinoma, stage IIA (down staged from presumed stage IIIA).  Staging MRI of the brain negative. The patient received neoadjuvant chemotherapy with carboplatin/Alimta x2. Followup scans showed considerable response. The patient is status post right upper lobectomy and lymphadenectomy (discharged 03/07/2012). Per Thoracic Conference, additional two cycles of adjuvant carboplatin/Alimta was advised and initiated on 04/11/2012. She has since remained in remission.    Low-dose screening CT of the chest July 2018 with a 1.1 left lower lobe nodule noted.  Follow-up PET scan the nodule was photopenic.  The patient has a follow-up review now in February 2020 after we have her apply for assistance with [facility].  A chest x-ray be requested in 5 weeks to see the physician in 6 weeks.  This proceeded with patient assessed February 12, 2020 with chest x-ray February 4  not significantly changed from previous.  At this point will reassess again at 6 months with MD, chest x-ray CBC and CMP.     This is reassessed September 30, 2020 with a left  hilar nodule having dropped from 10 mm to 6 mm.  We will continue reassessment every 6 months.  The patient's follow-up chest x-ray February 22, 2021 is negative for abnormalities though low-dose CT scan is felt reasonable and be scheduled within the next 3 months.  She will be seen formally after the scan is done.  She agrees with this plan and follow-up.  The patient's follow-up low-dose CT chest done in follow-up 5/17/2021 was negative for recurrent disease.  There remains stable fairly dense pulmonary nodule left lower lobe thought to be a granuloma.  The patient had a follow-up chest x-ray 11/6/2021 without new abnormalities.  We discussed a repeat low-dose CT scan but she has an MRI possibly scheduled next several months of the lumbar spine.    Paced rhythm at 5/9/2022 with progressive symptoms of abdominal discomfort and swelling, her physical exam is not particularly abnormal but she is also having a little hip pain and has chronic issues with shortness of breath and cough as well as smoking.    Patient underwent scans at [facility] with CT chest abdomen pelvis 5/24/2022 demonstrating indeterminate 8 x 9 mm left lower lobe pulmonary nodule, indeterminate bilateral adrenal gland nodules measuring up to 1.4 cm.  There is no comparison studies done on these examinations.    The patient is seen 6/13/2022 discussed that she had previous scans 2015 showing a left adrenal adenoma but no abnormalities in the right adrenal gland.  Follow-up studies scheduled 3 months intervals [place].  Smoking cessation plan and pulmonary referral planned.  Patient assessed by follow-up CT chest abdomen pelvis 10/12/2022 without evidence of recurrent disease.    Subsequent MRI testing 3/24/2023 demonstrated L2 with respect L3 diffuse annular disc bulge and posterior epidural fat contributing to moderate moderate narrowing of thecal sac, L3-L4 with mild to moderate bilateral facet overgrowth and diffuse posterior disc  osteophyte complex.  Similar findings L4-5, L5 to/1 with moderate foraminal narrowing.  Patient felt to be candidate for epidural steroid injections and medial branch blockade now also anticipated.  There is, fortunately, no evidence of metastatic disease present though the patient is quite symptomatic per back pain.    Patient is a 2/6/2024 follow-up anticipated 3 months.    She underwent further intervention with pain management 4/15/2024, unfortunately she felt her pain worsened after this procedure.  She does not plan to undergo any further procedures with pain management will continue on oral pain medication through pain management.    5/20/2024-continues with chronic cough, this is stable.  Denies any new bone pain, shortness of breath.    2.  COPD.  Currently without symptoms of exacerbation.  She does see pulmonology and a follow-up will be scheduled.    3.  Smoking cessation.  Patient currently continuing with nicotine lozenges     4. Chronic pain. The patient is seen and managed by pain management.  Recent increase in back pain noted by primary care with subsequent MRI testing 3/24/2023 demonstrated L2 with respect L3 diffuse annular disc bulge and posterior epidural fat contributing to moderate moderate narrowing of thecal sac, L3-L4 with mild to moderate bilateral facet overgrowth and diffuse posterior disc osteophyte complex.  Similar findings L4-5, L5 to/1 with moderate foraminal narrowing.  Patient felt to be candidate for epidural steroid injections and medial branch blockade now also anticipated.  There is, fortunately, no evidence of metastatic disease present though the patient is quite symptomatic per back pain.  The patient did undergo therapy through pain management to modest improvement, loss of insurance just prior to her last trial.  Her insurance has now been reinstated when she is seen 1/15/2024 and her back pain persists.  We have discussed repeat pain management assessment at this  point.  Plans for annual branch block and RFA therapy 3/6/2024    5.  Recent positive findings for Cologuard, colonoscopy as above with polyps removed including ascending colon tubular adenoma and hyperplastic polyp in the rectum.    6.  Thyroid ultrasound 3/24/2023 thyroid nodules-1 year follow-up indicated.    PLAN:  Return in 3 months for MD follow-up.  Continue follow-up with pain management.  Call/return sooner if needed.

## 2024-05-20 ENCOUNTER — LAB (OUTPATIENT)
Dept: LAB | Facility: HOSPITAL | Age: 64
End: 2024-05-20
Payer: COMMERCIAL

## 2024-05-20 ENCOUNTER — OFFICE VISIT (OUTPATIENT)
Dept: ONCOLOGY | Facility: CLINIC | Age: 64
End: 2024-05-20
Payer: COMMERCIAL

## 2024-05-20 VITALS
DIASTOLIC BLOOD PRESSURE: 88 MMHG | SYSTOLIC BLOOD PRESSURE: 148 MMHG | HEIGHT: 62 IN | TEMPERATURE: 98 F | RESPIRATION RATE: 18 BRPM | OXYGEN SATURATION: 98 % | BODY MASS INDEX: 23.46 KG/M2 | WEIGHT: 127.5 LBS | HEART RATE: 67 BPM

## 2024-05-20 DIAGNOSIS — C34.92 ADENOCARCINOMA OF LEFT LUNG: ICD-10-CM

## 2024-05-20 DIAGNOSIS — C34.92 ADENOCARCINOMA OF LEFT LUNG: Primary | ICD-10-CM

## 2024-05-20 LAB
ALBUMIN SERPL-MCNC: 4.1 G/DL (ref 3.5–5.2)
ALBUMIN/GLOB SERPL: 1.2 G/DL
ALP SERPL-CCNC: 78 U/L (ref 39–117)
ALT SERPL W P-5'-P-CCNC: 9 U/L (ref 1–33)
ANION GAP SERPL CALCULATED.3IONS-SCNC: 10.2 MMOL/L (ref 5–15)
AST SERPL-CCNC: 18 U/L (ref 1–32)
BASOPHILS # BLD AUTO: 0.05 10*3/MM3 (ref 0–0.2)
BASOPHILS NFR BLD AUTO: 0.8 % (ref 0–1.5)
BILIRUB SERPL-MCNC: 0.3 MG/DL (ref 0–1.2)
BUN SERPL-MCNC: 9 MG/DL (ref 8–23)
BUN/CREAT SERPL: 10.6 (ref 7–25)
CALCIUM SPEC-SCNC: 9.6 MG/DL (ref 8.6–10.5)
CHLORIDE SERPL-SCNC: 105 MMOL/L (ref 98–107)
CO2 SERPL-SCNC: 26.8 MMOL/L (ref 22–29)
CREAT SERPL-MCNC: 0.85 MG/DL (ref 0.57–1)
DEPRECATED RDW RBC AUTO: 40.4 FL (ref 37–54)
EGFRCR SERPLBLD CKD-EPI 2021: 77.1 ML/MIN/1.73
EOSINOPHIL # BLD AUTO: 0.2 10*3/MM3 (ref 0–0.4)
EOSINOPHIL NFR BLD AUTO: 3.2 % (ref 0.3–6.2)
ERYTHROCYTE [DISTWIDTH] IN BLOOD BY AUTOMATED COUNT: 13.4 % (ref 12.3–15.4)
GLOBULIN UR ELPH-MCNC: 3.4 GM/DL
GLUCOSE SERPL-MCNC: 96 MG/DL (ref 65–99)
HCT VFR BLD AUTO: 41.1 % (ref 34–46.6)
HGB BLD-MCNC: 13.7 G/DL (ref 12–15.9)
IMM GRANULOCYTES # BLD AUTO: 0.01 10*3/MM3 (ref 0–0.05)
IMM GRANULOCYTES NFR BLD AUTO: 0.2 % (ref 0–0.5)
LYMPHOCYTES # BLD AUTO: 2.73 10*3/MM3 (ref 0.7–3.1)
LYMPHOCYTES NFR BLD AUTO: 43.9 % (ref 19.6–45.3)
MCH RBC QN AUTO: 27.7 PG (ref 26.6–33)
MCHC RBC AUTO-ENTMCNC: 33.3 G/DL (ref 31.5–35.7)
MCV RBC AUTO: 83 FL (ref 79–97)
MONOCYTES # BLD AUTO: 0.47 10*3/MM3 (ref 0.1–0.9)
MONOCYTES NFR BLD AUTO: 7.6 % (ref 5–12)
NEUTROPHILS NFR BLD AUTO: 2.76 10*3/MM3 (ref 1.7–7)
NEUTROPHILS NFR BLD AUTO: 44.3 % (ref 42.7–76)
NRBC BLD AUTO-RTO: 0 /100 WBC (ref 0–0.2)
PLATELET # BLD AUTO: 276 10*3/MM3 (ref 140–450)
PMV BLD AUTO: 8.6 FL (ref 6–12)
POTASSIUM SERPL-SCNC: 3.2 MMOL/L (ref 3.5–5.2)
PROT SERPL-MCNC: 7.5 G/DL (ref 6–8.5)
RBC # BLD AUTO: 4.95 10*6/MM3 (ref 3.77–5.28)
SODIUM SERPL-SCNC: 142 MMOL/L (ref 136–145)
WBC NRBC COR # BLD AUTO: 6.22 10*3/MM3 (ref 3.4–10.8)

## 2024-05-20 PROCEDURE — 80053 COMPREHEN METABOLIC PANEL: CPT

## 2024-05-20 PROCEDURE — 99213 OFFICE O/P EST LOW 20 MIN: CPT | Performed by: NURSE PRACTITIONER

## 2024-05-20 PROCEDURE — 36415 COLL VENOUS BLD VENIPUNCTURE: CPT

## 2024-05-20 PROCEDURE — 85025 COMPLETE CBC W/AUTO DIFF WBC: CPT

## 2024-06-20 ENCOUNTER — OFFICE VISIT (OUTPATIENT)
Dept: PAIN MEDICINE | Facility: CLINIC | Age: 64
End: 2024-06-20
Payer: COMMERCIAL

## 2024-06-20 ENCOUNTER — PREP FOR SURGERY (OUTPATIENT)
Dept: SURGERY | Facility: SURGERY CENTER | Age: 64
End: 2024-06-20
Payer: COMMERCIAL

## 2024-06-20 VITALS
BODY MASS INDEX: 22.82 KG/M2 | DIASTOLIC BLOOD PRESSURE: 84 MMHG | HEIGHT: 62 IN | OXYGEN SATURATION: 100 % | HEART RATE: 74 BPM | WEIGHT: 124 LBS | SYSTOLIC BLOOD PRESSURE: 150 MMHG | TEMPERATURE: 96.4 F

## 2024-06-20 DIAGNOSIS — M54.16 LUMBAR RADICULOPATHY: ICD-10-CM

## 2024-06-20 DIAGNOSIS — M47.816 LUMBAR FACET ARTHROPATHY: ICD-10-CM

## 2024-06-20 DIAGNOSIS — G89.29 CHRONIC BILATERAL LOW BACK PAIN WITHOUT SCIATICA: ICD-10-CM

## 2024-06-20 DIAGNOSIS — M48.062 SPINAL STENOSIS OF LUMBAR REGION WITH NEUROGENIC CLAUDICATION: ICD-10-CM

## 2024-06-20 DIAGNOSIS — M54.50 CHRONIC BILATERAL LOW BACK PAIN WITHOUT SCIATICA: ICD-10-CM

## 2024-06-20 DIAGNOSIS — M48.062 SPINAL STENOSIS OF LUMBAR REGION WITH NEUROGENIC CLAUDICATION: Primary | ICD-10-CM

## 2024-06-20 DIAGNOSIS — M53.3 SACROILIAC JOINT PAIN: ICD-10-CM

## 2024-06-20 DIAGNOSIS — M54.2 NECK PAIN: Primary | ICD-10-CM

## 2024-06-20 PROCEDURE — 99214 OFFICE O/P EST MOD 30 MIN: CPT

## 2024-06-20 RX ORDER — DIAZEPAM 5 MG/1
10 TABLET ORAL ONCE
OUTPATIENT
Start: 2024-06-20

## 2024-06-20 RX ORDER — CELECOXIB 200 MG/1
200 CAPSULE ORAL DAILY
Qty: 30 CAPSULE | Refills: 1 | Status: SHIPPED | OUTPATIENT
Start: 2024-06-20

## 2024-06-20 NOTE — PROGRESS NOTES
CHIEF COMPLAINT  Back and back pain    Subjective   Yolanda Lee Hatchett is a 64 y.o. female  who presents to the office for follow-up of procedure.  She completed a Bilateral L4-S1 on 4/15/24 performed by Dr. Bauman for management of back pain. Patient reports no relief from the procedure, the pain has actually worsened.     Today pain is 7/10VAS in severity. Pain is located in her low back and radiates into left hip and down lateral/posterior thigh terminating below the knee. Describes this pain as a nearly continuous aching and burning. Pain is worsened by prolonged standing or sitting, walking long distances, and increased physical activity. New complaint of right sided neck pain that radiates into right shoulder and down right arm into hand. Rates pain 7/10VAS. Pain worsens with driving, looking up, and turning head to the right. Pain improved with rest/reposition, and heat/ice. She continues with Celebrex 200mg daily. Patient also self medicates with THC for pain relief.     Unable to tolerate Gabapentin - caused vertigo, minimal relief with Meloxicam     History of nonsmall cell lung carcinoma - currently in remission      Procedures:  4/15/24 - Bilateral L4-S1 RFA - no relief  3/6/24 - Bilateral L4-S1 MBB - 100% relief x 3 days  9/25/23 - Bilateral L4-S1 MBB - 80% relief x 3 days  5/15/23 - L5/S1 LESI - 100% relief x 3 weeks     Back Pain  This is a chronic problem. The current episode started more than 1 year ago. The problem occurs constantly. The problem has been worse since onset. The pain is present in the lumbar spine. The quality of the pain is described as aching and burning. The pain radiates to the left thigh (left lateral/posterior leg - pain radiates into left foot). The pain is at a severity of 7/10. The pain is moderate. The pain is The same all the time. The symptoms are aggravated by sitting, standing and position (walking long distances). Associated symptoms include weakness (bilateral leg  intermittently). Pertinent negatives include no abdominal pain, chest pain, dysuria, fever, headaches or numbness.   Neck Pain   This is a chronic problem. The current episode started more than 1 year ago. The problem occurs constantly. The pain is associated with an unknown factor. The pain is present in the right side and midline. The quality of the pain is described as aching. The pain is at a severity of 7/10. The symptoms are aggravated by twisting, bending and position (driving, looking up). Associated symptoms include weakness (bilateral leg intermittently). Pertinent negatives include no chest pain, fever, headaches or numbness. She has tried NSAIDs and heat for the symptoms. The treatment provided mild relief.      PEG Assessment   What number best describes your pain on average in the past week?7  What number best describes how, during the past week, pain has interfered with your enjoyment of life?0  What number best describes how, during the past week, pain has interfered with your general activity?  0    Review of Pertinent Medical Data ---  MRI LUMBAR SPINE WITH AND WITHOUT CONTRAST ON 03/24/2023     CLINICAL HISTORY: Patient has history of lung cancer and has back pain,  bilateral lower extremity numbness and weakness.     TECHNIQUE: Sagittal T1, proton density and fat-suppressed T2 and  postcontrast sagittal fat-suppressed T1-weighted images were obtained  lumbar spine. In addition thin cut axial T1-weighted images were  obtained angled through the interspaces from T11 to S1 axial T2 and  postcontrast axial T1-weighted images were obtained from T11 to S1.     This is correlated to prior post myelogram CT lumbar spine from [facility] on 02/12/2016.     FINDINGS: The distal thoracic cord and conus is normal in signal  intensity. The conus terminates at the level of the inferior body of L2  which is borderline but probably lower limits of normal.     At T12-L1 the disc space and facets  are normal with no canal or  foraminal narrowing.     At L1-2 the disc space and facets are normal with no canal or foraminal  narrowing.     At L2 there is mild bilateral facet overgrowth, mild disc space  narrowing and degenerative endplate change, 2 mm retrolisthesis of L2 on  L3 and diffuse annular disc bulge and there is mild-to-moderate  narrowing of the thecal sac and there is mild bilateral foraminal  narrowing.     At L3-4 there is mild-to-moderate bilateral facet overgrowth. There is  disc space narrowing and degenerative endplate changes, 3 mm  retrolisthesis of L3 on L4, diffuse annular spurring and bulging disc  material. Prominent posterior epidural fat and a combination of all of  the above findings contributes to severe generalized narrowing of the  thecal sac diminished spinal fluid bathing the cauda equina at this  level and there is spurring bulging disc material extending into the  neural foramina with mild-to-moderate bilateral foraminal narrowing.     At L4-5 there is mild-to-moderate bilateral facet overgrowth. There is  severe disc space narrowing and there are degenerative endplate changes  diffuse posterior disc osteophyte complex effaces the ventral aspect of  thecal sac and there is prominent posterior epidural fat and there is  severe narrowing of the thecal sac and diminished spinal fluid bathing  the cauda equina at this level. There is some slight narrowing of the  lateral recesses posterior spurs abut the ventral aspect traversing L5  nerve roots. There is spurring into the inferior aspect of the neural  foramina bilaterally with mild-to-moderate right and there is moderate  left bony foraminal narrowing.     At L5-S1 there is mild bilateral facet overgrowth and there is severe  disc space narrowing and there are degenerative endplate changes, 5 mm  retrolisthesis of L5 with respect to S1 with uncovering posterior  inferior aspect of L5-S1 disc space and disc material bulging along  the  posterior superior endplate of S1 eccentric right paracentrally where it  protrudes where there is some herniated disc material along the right  posterior superior endplate of S1. There is some narrowing of the thecal  sac. There is slight narrowing lateral recesses. There is spurring into  the foramina bilaterally and there is moderate bilateral bony foraminal  narrowing.     IMPRESSION:  1. The disc space and facets are normal with no canal or foraminal  narrowing at T12-L1 and L1-L2.  2. At L2-3 there is mild bilateral facet overgrowth, disc space  narrowing and degenerative endplate changes and a 3 mm retrolisthesis of  L2 with respect to L3 and diffuse annular disc bulge and posterior  epidural fat contribute to mild-to-moderate narrowing of the thecal sac  and there is mild bilateral foraminal narrowing.  3. At L3-4 there is mild-to-moderate bilateral facet overgrowth, disc  space narrowing and degenerative endplate change, 3 mm retrolisthesis of  L3 on L4 and there is a diffuse posterior disc osteophyte complex and  prominent posterior epidural fat, the combination of which contributes  to severe narrowing of the thecal sac with diminished spinal fluid  bathing the cauda equina at this level. There is spurring bulging disc  material extending into the neural foramina resulting in  mild-to-moderate bilateral foraminal narrowing.  4. At L4-5 there is mild-to-moderate bilateral facet overgrowth and  there is severe disc space narrowing and degenerative endplate changes  and 2-3 mm retrolisthesis of L4 on L5 and prominent diffuse posterior  disc osteophyte complex and prominent posterior epidural fat and there  is severe narrowing of the thecal sac, diminished spinal fluid bathing  the cauda equina at this level, spurring into the foramina and there is  mild-to-moderate right and moderate left foraminal narrowing.  5. At L5-S1 there is mild bilateral facet overgrowth, disc space  narrowing and degenerative  "endplate changes 5 mm retrolisthesis of L5  with respect to S1 with uncovering of the posterior inferior aspect of  L5-S1 disc space and disc material diffusely bulging along the posterior  superior endplate of S1. There is eccentric herniated disc material  along the right paracentral superior body and endplate of S1 measuring 8  x 5 mm abuts the anteromedial aspect traversing right S1 nerve root but  does not have mass effect on it or displace the right S1 nerve root.  There is spurring into the foramina and there is moderate bilateral  foraminal narrowing.     This report was finalized on 3/24/2023 1:53 PM by Dr. Cody Mckeon M.D.     The following portions of the patient's history were reviewed and updated as appropriate: allergies, current medications, past family history, past medical history, past social history, past surgical history, and problem list.    Review of Systems   Constitutional:  Negative for chills and fever.   Respiratory:  Negative for cough and shortness of breath.    Cardiovascular:  Negative for chest pain.   Gastrointestinal:  Negative for abdominal pain, constipation and diarrhea.   Genitourinary:  Negative for difficulty urinating and dysuria.   Musculoskeletal:  Positive for back pain and neck pain.   Neurological:  Positive for weakness (bilateral leg intermittently). Negative for dizziness, light-headedness, numbness and headaches.   Psychiatric/Behavioral:  Negative for agitation.      I have reviewed and confirmed the accuracy of the ROS as documented by the MA/LPN/RN NE Narvaez    Vitals:    06/20/24 1536   BP: 150/84   BP Location: Right arm   Patient Position: Sitting   Pulse: 74   Temp: 96.4 °F (35.8 °C)   TempSrc: Temporal   SpO2: 100%   Weight: 56.2 kg (124 lb)   Height: 157.5 cm (62.01\")   PainSc:   7   PainLoc: Back     Objective   Physical Exam  Constitutional:       Appearance: Normal appearance.   HENT:      Head: Normocephalic.   Cardiovascular:      Rate and " Rhythm: Normal rate and regular rhythm.   Pulmonary:      Effort: Pulmonary effort is normal.      Breath sounds: Normal breath sounds.   Musculoskeletal:      Cervical back: Normal range of motion. Tenderness and bony tenderness present.      Lumbar back: Tenderness and bony tenderness present. No spasms. Decreased range of motion. Positive left straight leg raise test. Negative right straight leg raise test.      Comments: + lumbar facet loading/tenderness   Skin:     General: Skin is warm and dry.      Capillary Refill: Capillary refill takes less than 2 seconds.   Neurological:      General: No focal deficit present.      Mental Status: She is alert and oriented to person, place, and time.   Psychiatric:         Mood and Affect: Mood normal.         Behavior: Behavior normal.         Thought Content: Thought content normal.         Cognition and Memory: Cognition normal.       Assessment & Plan   Diagnoses and all orders for this visit:    1. Neck pain (Primary)  -     XR Spine Cervical Complete 4 or 5 View; Future  -     Ambulatory Referral to Physical Therapy    2. Chronic bilateral low back pain without sciatica  -     celecoxib (CeleBREX) 200 MG capsule; Take 1 capsule by mouth Daily.  Dispense: 30 capsule; Refill: 1    3. Spinal stenosis of lumbar region with neurogenic claudication    4. Sacroiliac joint pain    5. Lumbar facet arthropathy    6. Lumbar radiculopathy      Yolanda Lee Hatchett reports a pain score of 7.  Given her pain assessment as noted, treatment options were discussed and the following options were decided upon as a follow-up plan to address the patient's pain: continuation of current treatment plan for pain and referral to Physical Therapy.    --- Left L5 & S1 TF LESI   ---  Indications for epidural injection:  Plan is to proceed with epidural at the appropriate level.  If the patient receives significant pain reduction and improvement in function and the plan will be to repeat the  epidural when the pain worsens.  If a second epidural provides at least 6 weeks of sustained improvement that includes both pain reduction and improvement in function then an epidural injection could be repeated once again at the same level.  This is a mutual decision between the clinician and the patient that includes discussions including risks and benefits in detail as well as alternative therapies.  Patient's questions were answered to their satisfaction and to their understanding.  ---   Discussed with the patient that sedation is optional for this procedure.  The sedation offered is called conscious sedation which is different from general anesthesia that is utilized in surgical procedures. The dosing of the sedation is determined by the physician and they will be monitored throughout the procedure. With conscious sedation it is possible to remember parts or all of the procedure, this is normal. They will need to have a  with them as driving is prohibited following conscious sedation.      NPO instructions for conscious sedation:  --- Do not eat 6 hours prior to the procedure.   --- Do not drink any dairy or citrus 4 hours prior to the procedure.   --- Do not drink anything, including clear liquids, 2 hours prior to procedure.      If the NPO instructions are not followed then the procedure may be performed without sedation or the procedure will need to be rescheduled.    --- Reviewed CMP from 5/20/24. Continue Celebrex. Refill sent to pharmacy.   --- Referral for PT due to worsening neck pain  --- X-ray cervical spine due to worsening pain  --- Follow-up for procedure     DEEPTI REPORT  As the clinician, I personally reviewed the DEEPTI from 6/20/24 while the patient was in the office today.    Dictated utilizing Dragon dictation.

## 2024-06-29 ENCOUNTER — HOSPITAL ENCOUNTER (OUTPATIENT)
Dept: GENERAL RADIOLOGY | Facility: HOSPITAL | Age: 64
Discharge: HOME | End: 2024-06-29
Payer: COMMERCIAL

## 2024-06-29 DIAGNOSIS — M54.2 NECK PAIN: ICD-10-CM

## 2024-06-29 PROCEDURE — 72050 X-RAY EXAM NECK SPINE 4/5VWS: CPT

## 2024-07-12 ENCOUNTER — TELEPHONE (OUTPATIENT)
Dept: FAMILY MEDICINE CLINIC | Facility: CLINIC | Age: 64
End: 2024-07-12
Payer: COMMERCIAL

## 2024-07-16 ENCOUNTER — TREATMENT (OUTPATIENT)
Dept: PHYSICAL THERAPY | Facility: CLINIC | Age: 64
End: 2024-07-16
Payer: COMMERCIAL

## 2024-07-16 DIAGNOSIS — Z78.9 IMPAIRED ACTIVITIES OF DAILY LIVING: ICD-10-CM

## 2024-07-16 DIAGNOSIS — M53.82 DECREASED RANGE OF MOTION OF INTERVERTEBRAL DISCS OF CERVICAL SPINE: ICD-10-CM

## 2024-07-16 DIAGNOSIS — R29.898 DECREASED STRENGTH OF UPPER EXTREMITY: ICD-10-CM

## 2024-07-16 DIAGNOSIS — M54.2 PAIN, NECK: Primary | ICD-10-CM

## 2024-07-16 NOTE — PROGRESS NOTES
"  Physical Therapy Initial Evaluation and Plan of Care                                               [address], [address]                                                           [place], [place]                                                         [phone]    Patient: Yolanda Lee Hatchett   : 1960  Diagnosis/ICD-10 Code:  Pain, neck [M54.2]  Referring practitioner: NE Narvaez  Date of Initial Visit: 2024  Today's Date: 2024  Patient seen for 1 sessions           Subjective Questionnaire: NDI:7      Subjective Evaluation    History of Present Illness  Mechanism of injury: Pt reports chronic neck pain beginning several years ago without injury or trauma. She describes her right sided neck pain as sharp and tingling. The pain sometimes radiates into her R UE to her hand as well. Tingling usually occurs in her R hand and will last for a few hours each day. Her right sided neck pain comes on several times per day and worsens with any movement of her head, mostly turning her head to the right. She states that her sleep is often disturbed and she has at least one headache per day.     She has difficulty performing even light household chores such as sweeping. She reports dizziness and nausea on occasion but states that she has also been diagnosed with vertigo.     OTC pain medication, pain creams, and hot showers help to alleviate her pain.    PMHx: COPD, lung cancer, emphysema, MI, and vertigo      Patient Occupation:  Quality of life: good    Pain  Current pain ratin  At worst pain ratin  Location: R neck into midline and occasionally into R UE  Quality: sharp, discomfort, needle-like and radiating  Relieving factors: heat and medications (varying pain creams)  Aggravating factors: lifting, movement and overhead activity  Progression: no change    Hand dominance: right    Diagnostic Tests  X-ray: abnormal (\" C2-3 neuroforaminal encroachment on " "the left, C2-3, C3-4 and C4-5 neuroforaminal encroachment on the right. \")    Treatments  Previous treatment: medication  Current treatment: medication  Patient Goals  Patient goals for therapy: decreased pain, increased motion and independence with ADLs/IADLs  Patient goal: participate in fun activities like cornhole           Objective        Special Questions  Patient is experiencing headaches.       Postural Observations  Seated posture: fair  Standing posture: fair      Palpation     Right Tenderness of the cervical paraspinals, middle trapezius, suboccipitals and upper trapezius.     Tenderness   Cervical Spine   Tenderness in the spinous process and right scapula.     Right Shoulder  Tenderness in the clavicle.     Neurological Testing     Sensation   Cervical/Thoracic   Left   Intact: light touch    Right   Intact: light touch    Active Range of Motion   Cervical/Thoracic Spine   Cervical    Flexion: 35 degrees   Extension: 20 degrees with pain  Left lateral flexion: 25 degrees with pain  Right lateral flexion: 20 degrees with pain  Left rotation: 55 degrees with pain  Right rotation: 65 degrees with pain  Left Shoulder   Normal active range of motion  Flexion: 170 degrees     Right Shoulder   Normal active range of motion  Flexion: 170 degrees     Additional Active Range of Motion Details  Most pain with rotation to the (R)  Discomfort with end-range flexion bilaterally    Strength/Myotome Testing   Cervical Spine   Neck extension: 4+  Neck flexion: 4+    Left   Neck lateral flexion (C3): 4+    Right   Neck lateral flexion (C3): 4+    Left Shoulder     Planes of Motion   Flexion: 4+   Extension: 4+   Abduction: 4+     Right Shoulder     Planes of Motion   Flexion: 4-   Extension: 4   Abduction: 4     Left Elbow   Flexion: 5  Extension: 5    Right Elbow   Flexion: 5  Extension: 5          Assessment & Plan       Assessment  Impairments: abnormal or restricted ROM, activity intolerance, impaired physical " strength, lacks appropriate home exercise program and pain with function   Functional limitations: lifting, sleeping, pulling, pushing, uncomfortable because of pain, moving in bed, standing and unable to perform repetitive tasks   Assessment details: Rebeca is a 63 y/o female reporting to OP PT for initial evaluation regarding chronic neck pain that has been present for many years without known injury or trauma. She describes her right sided neck pain as sharp, burning, and tingling that comes and goes throughout her day. The pain comes on with any movement of her head, mostly with looking to the right. The pain often radiates through her RUE to her hand and she experiences intermittent tingling in her R hand as well. Rebeca also has at least one headache per day. X-ray imaging taken this year shows degenerative changes at levels C2-C5. Rebeca has great difficulty with performing household chores such as sweeping and mopping, driving, pushing or pulling at work, and her sleep is greatly disturbed. Upon evaluation, she presents with significant deficits in cervical range of motion and has pain with BUE range of motion. Rebeca has decreased strength in her R UE with pain reported during MMT. She is also TTP along her R paraspinals, upper trapezius, and suboccipitals. Skilled OP PT is deemed reasonable and necessary in order to address these deficits, limitations, and impairments and assist with return to prior level of function.   Prognosis: good    Goals  Plan Goals: Short Term: 4 weeks  1. Pt will be independent and compliant with initial HEP  2. Pt will report 50% improvement in reported neck pain  3. Pt will demonstrate improved cervical AROM in all planes of motion to WNL for indication of improved ability to perform all ADLs  4. Pt will perform BUE AROM to WNL without reports of pain for indication of improved mobility and ability to perform ADLs  5. Pt will tolerate initiation of STM to cervical  musculature    Long Term: 8 weeks  1. Pt will be independent with progressed HEP  2. Pt will report 0-3/10 pain in her neck  3. Pt will report improved sleep through the night with less disturbance from neck pain  4. Pt will demonstrate symmetrical BUE strength in all planes for indication of symmetrical ability to perform all ADLs independently    Plan  Therapy options: will be seen for skilled therapy services  Planned modality interventions: electrical stimulation/Russian stimulation, thermotherapy (hydrocollator packs) and cryotherapy  Planned therapy interventions: functional ROM exercises, home exercise program, therapeutic activities, stretching, strengthening, neuromuscular re-education and manual therapy  Frequency: 2x week  Duration in weeks: 8  Treatment plan discussed with: patient        Manual Therapy:    0     mins  27516;  Therapeutic Exercise:    10     mins  19394;     Neuromuscular Katelyn:    0    mins  18102;    Therapeutic Activity:     10     mins  23352;     Gait Trainin     mins  48241;     Ultrasound:     0     mins  33200;    Electrical Stimulation:    0     mins  73689 ( );  Dry Needling     0     mins self-pay    Timed Treatment:   20   mins   Total Treatment:     39   mins    PT SIGNATURE: Chad Mcclellan PT, DPT  [place] License #: [id number]   Chad Mcclellan PT, 24, 3:58 PM EDT  DATE TREATMENT INITIATED: 2024    Initial Certification  Certification Period: 10/14/2024  I certify that the therapy services are furnished while this patient is under my care.  The services outlined above are required by this patient, and will be reviewed every 90 days.     PHYSICIAN: Samantha Dubose APRCHAPARRITA      DATE:     Please sign and return via fax to [phone].. Thank you, [facility].

## 2024-07-31 ENCOUNTER — TELEPHONE (OUTPATIENT)
Dept: PHYSICAL THERAPY | Facility: CLINIC | Age: 64
End: 2024-07-31

## 2024-07-31 NOTE — TELEPHONE ENCOUNTER
Caller: Hatchett, Yolanda Lee    Relationship: Self         What was the call regarding: DOES NOT WANT TO GET OUT IN THE STORM

## 2024-08-06 ENCOUNTER — TREATMENT (OUTPATIENT)
Dept: PHYSICAL THERAPY | Facility: CLINIC | Age: 64
End: 2024-08-06
Payer: COMMERCIAL

## 2024-08-06 DIAGNOSIS — M54.2 PAIN, NECK: Primary | ICD-10-CM

## 2024-08-06 DIAGNOSIS — M53.82 DECREASED RANGE OF MOTION OF INTERVERTEBRAL DISCS OF CERVICAL SPINE: ICD-10-CM

## 2024-08-06 DIAGNOSIS — R29.898 DECREASED STRENGTH OF UPPER EXTREMITY: ICD-10-CM

## 2024-08-06 DIAGNOSIS — Z78.9 IMPAIRED ACTIVITIES OF DAILY LIVING: ICD-10-CM

## 2024-08-06 NOTE — PROGRESS NOTES
Physical Therapy Daily Treatment Note               [address]                                                                                                                                             [place] [address]    Patient: Yolanda Lee Hatchett   : 1960  Referring practitioner: NE Narvaez  Date of Initial Visit: Type: THERAPY  Noted: 2024  Today's Date: 2024  Patient seen for 2 sessions       Visit Diagnoses:    ICD-10-CM ICD-9-CM   1. Pain, neck  M54.2 723.1   2. Decreased range of motion of intervertebral discs of cervical spine  M53.82 724.9   3. Decreased strength of upper extremity  R29.898 729.89   4. Impaired activities of daily living  Z78.9 V49.89       Subjective Evaluation    History of Present Illness    Subjective comment: Pt reports that she has a current pain level of 5/10 in (R) side of neck.  Describes it as burning , pulling.       Objective   See Exercise, Manual, and Modality Logs for complete treatment.   Added scapular retractions, and open books.    Assessment & Plan       Assessment  Assessment details: Pt completed treatment with no c/o increased pain in neck.  She tolerated the addition of scapular retractions and open books .  Pt's HEP was updated and given to her. Plan to progress per POC.          Timed:         Manual Therapy:    0     mins  66527;     Therapeutic Exercise:    13     mins  87380;     Neuromuscular Katelyn:    0    mins  10937;    Therapeutic Activity:     10     mins  81495;     Gait Trainin     mins  04901;     Ultrasound:     0     mins  93320;    E Stim                            00    mins   [id number]( [id number])  Work Bravo/Cond      0    mins   90647        Timed Treatment:   23   mins   Total Treatment:     23   mins    Arie Davis PTA  [place] License: [id number]

## 2024-08-08 ENCOUNTER — TREATMENT (OUTPATIENT)
Dept: PHYSICAL THERAPY | Facility: CLINIC | Age: 64
End: 2024-08-08
Payer: COMMERCIAL

## 2024-08-08 DIAGNOSIS — M53.82 DECREASED RANGE OF MOTION OF INTERVERTEBRAL DISCS OF CERVICAL SPINE: ICD-10-CM

## 2024-08-08 DIAGNOSIS — R29.898 DECREASED STRENGTH OF UPPER EXTREMITY: ICD-10-CM

## 2024-08-08 DIAGNOSIS — M54.2 PAIN, NECK: Primary | ICD-10-CM

## 2024-08-08 DIAGNOSIS — Z78.9 IMPAIRED ACTIVITIES OF DAILY LIVING: ICD-10-CM

## 2024-08-08 NOTE — PROGRESS NOTES
Physical Therapy Daily Progress Note                                            [address] [facility], [address]                                                           [place], [place]                                                         [phone]    Patient: Yolanda Lee Hatchett   : 1960  Diagnosis/ICD-10 Code:  Pain, neck [M54.2]  Referring practitioner: NE Narvaez  Date of Initial Visit: Type: THERAPY  Noted: 2024  Today's Date: 2024  Patient seen for 3 sessions           Subjective Evaluation    History of Present Illness    Subjective comment: Rebeca reports little to no change in her neck pain       Objective   See Exercise, Manual, and Modality Logs for complete treatment.       Assessment & Plan       Assessment  Assessment details: Rebeca continues to experience a lot of pain in her neck with all stretching and range of motion activities. The pain remains mostly on the right, but with stretching, she feels it radiating medially now. Chin tucks and bilateral shoulder external rotation strengthening exercises were added today to assist with improving stabilization of the cervical and thoracic spine. Soft tissue massage was initiated today due to persisting complaints of tightness. Rebeca reported slight relief of symptoms during and after the massage, so this will be repeated at her next session.         Progress per Plan of Care           Manual Therapy:    10     mins  20155;  Therapeutic Exercise:    15     mins  68064;     Neuromuscular Katelyn:    10    mins  50120;    Therapeutic Activity:     0     mins  69218;     Gait Trainin     mins  67266;     Ultrasound:     0     mins  70106;    Electrical Stimulation:    0     mins  93372 ( );  Dry Needling     0     mins self-pay    Timed Treatment:   35   mins   Total Treatment:     35   mins    Chad Mcclellan PT, DPT  Physical Therapist  [place] License #: [id number]  Chad Mcclellan PT, 24, 3:13 PM  EDT

## 2024-08-13 ENCOUNTER — TREATMENT (OUTPATIENT)
Dept: PHYSICAL THERAPY | Facility: CLINIC | Age: 64
End: 2024-08-13
Payer: COMMERCIAL

## 2024-08-13 DIAGNOSIS — Z78.9 IMPAIRED ACTIVITIES OF DAILY LIVING: ICD-10-CM

## 2024-08-13 DIAGNOSIS — M54.2 PAIN, NECK: Primary | ICD-10-CM

## 2024-08-13 DIAGNOSIS — R29.898 DECREASED STRENGTH OF UPPER EXTREMITY: ICD-10-CM

## 2024-08-13 DIAGNOSIS — M53.82 DECREASED RANGE OF MOTION OF INTERVERTEBRAL DISCS OF CERVICAL SPINE: ICD-10-CM

## 2024-08-13 PROCEDURE — 97110 THERAPEUTIC EXERCISES: CPT

## 2024-08-13 PROCEDURE — 97112 NEUROMUSCULAR REEDUCATION: CPT

## 2024-08-13 PROCEDURE — 97140 MANUAL THERAPY 1/> REGIONS: CPT

## 2024-08-13 NOTE — PROGRESS NOTES
Physical Therapy Daily Progress Note                                            [address] [facility], [address]                                                           [place], [place]                                                         [phone]    Patient: Yolanda Lee Hatchett   : 1960  Diagnosis/ICD-10 Code:  Pain, neck [M54.2]  Referring practitioner: NE Narvaez  Date of Initial Visit: Type: THERAPY  Noted: 2024  Today's Date: 2024  Patient seen for 4 sessions           Subjective Evaluation    History of Present Illness    Subjective comment: Rebeca denies improvement in her neck pain. It is always worse after work. She reports good relief after added STM last session       Objective     See Exercise, Manual, and Modality Logs for complete treatment.     Assessment & Plan       Assessment  Assessment details: Rebeca continues to have significant reports of neck pain. Her cervical lateral flexion and rotation range of motion remains limited and is very painful at her current end-range. Today we again focused on stretching and cervical and scapular strengthening. Rebeca benefits from STM to her cervical musculature s/p treatment. We will re-assess next session and make plan for moving forward.         Progress per Plan of Care           Manual Therapy:    10     mins  89489;  Therapeutic Exercise:    13     mins  70364;     Neuromuscular Katelyn:    10    mins  73957;    Therapeutic Activity:     0     mins  23450;     Gait Trainin     mins  72183;     Ultrasound:     0     mins  00504;    Electrical Stimulation:    0     mins  26112 ( );  Dry Needling     0     mins self-pay    Timed Treatment:   33   mins   Total Treatment:     33   mins    Chad Mcclellan PT, DPT  Physical Therapist  [place] License #: [id number]  Chad Mcclellan PT, 24, 3:20 PM EDT

## 2024-08-15 ENCOUNTER — TREATMENT (OUTPATIENT)
Dept: PHYSICAL THERAPY | Facility: CLINIC | Age: 64
End: 2024-08-15
Payer: COMMERCIAL

## 2024-08-15 DIAGNOSIS — M54.2 PAIN, NECK: Primary | ICD-10-CM

## 2024-08-15 DIAGNOSIS — Z78.9 IMPAIRED ACTIVITIES OF DAILY LIVING: ICD-10-CM

## 2024-08-15 DIAGNOSIS — R29.898 DECREASED STRENGTH OF UPPER EXTREMITY: ICD-10-CM

## 2024-08-15 DIAGNOSIS — M53.82 DECREASED RANGE OF MOTION OF INTERVERTEBRAL DISCS OF CERVICAL SPINE: ICD-10-CM

## 2024-08-15 PROCEDURE — 97140 MANUAL THERAPY 1/> REGIONS: CPT

## 2024-08-15 PROCEDURE — 97110 THERAPEUTIC EXERCISES: CPT

## 2024-08-15 NOTE — PROGRESS NOTES
Re-Assessment   [address] [facility], [address]  [place], [place]  [phone]      Patient: Yolanda Lee Hatchett   : 1960  Diagnosis/ICD-10 Code:  Pain, neck [M54.2]  Referring practitioner: NE Narvaez  Date of Initial Visit: Type: THERAPY  Noted: 2024  Today's Date: 8/15/2024  Patient seen for 5 sessions      Subjective:   Yolanda Hatchett reports: continued, significant pain following work every day.      Improvements: pt reports slight improvement in her cervical mobility but her pain is the same    Subjective Questionnaire: NDI:9 (previous 7)  Clinical Progress: unchanged  Home Program Compliance: Yes  Treatment has included: therapeutic exercise, neuromuscular re-education, and manual therapy  Pain  Current pain ratin  At worst pain ratin      Objective          Active Range of Motion   Left Shoulder   Flexion: 180 degrees   External rotation BTH: Active external rotation behind the head: to occiput.     Right Shoulder   Flexion: 180 degrees   External rotation BTH: Active external rotation behind the head: to occiput.         Active Range of Motion   Cervical/Thoracic Spine   Cervical     Flexion: 50 degrees   Extension: 20 degrees with pain  Left lateral flexion: 30 degrees with pain  Right lateral flexion: 25 degrees with pain  Left rotation: 54 degrees with pain  Right rotation: 70 degrees with pain    Strength/Myotome Testing   Cervical Spine   Neck extension: 4+  Neck flexion: 4+     Left   Neck lateral flexion (C3): 4+     Right   Neck lateral flexion (C3): 4+         Assessment & Plan       Assessment  Assessment details: Rebeca has made limited progress in regards to reports of neck pain. Her NDI score increased from 7 to 9 points, indicating worsened perceived functional disability. Her reported current pain level was also slightly worse today. Rebeca reports compliance with her HEP 2-3 times per week outside of formal PT sessions. Her cervical flexion, bilateral  lateral flexion, rotation to the right, BUE shoulder flexion, and external rotation behind the head range of motion were improved today. STM to cervical musculature including suboccipitals and upper trapezius muscles was initiated and performed at her last 2 sessions which she reported good relief of symptoms with. Rebeca feels comfortable with her current HEP and would like to schedule a follow-up with pain management before continuing with formal sessions. We will await MD orders.       Progress toward previous goals: Partially Met    Goals  Plan Goals: Short Term: 4 weeks  1. Pt will be independent and compliant with initial HEP- MET  2. Pt will report 50% improvement in reported neck pain- Not met  3. Pt will demonstrate improved cervical AROM in all planes of motion to WNL for indication of improved ability to perform all ADLs- Progressing  4. Pt will perform BUE AROM to WNL without reports of pain for indication of improved mobility and ability to perform ADLs- MET though pt reports significant tightness  5. Pt will tolerate initiation of STM to cervical musculature- MET     Long Term: 8 weeks  1. Pt will be independent with progressed HEP- ongoing  2. Pt will report 0-3/10 pain in her neck- Not met  3. Pt will report improved sleep through the night with less disturbance from neck pain- MET  4. Pt will demonstrate symmetrical BUE strength in all planes for indication of symmetrical ability to perform all ADLs independently      Recommendations: Continue with recommendations return to referring provider  Timeframe: 1 month  Prognosis to achieve goals: fair    PT Signature: Chad Mcclellan PT, DPT  [place] License #: [id number]  Chad Mcclellan PT, 08/15/24, 3:15 PM EDT      Based upon review of the patient's progress and continued therapy plan, it is my medical opinion that Yolanda Hatchett should continue physical therapy treatment at [facility] PH[facility]  [address] [place] [address]  [facility] [address]  Psychiatric [id number]  [phone].    Signature: __________________________________  Samantha Dubose APRN    Manual Therapy:    10     mins  37513;  Therapeutic Exercise:    27     mins  49412;     Neuromuscular Katelyn:      0  mins  51842;    Therapeutic Activity:     0     mins  98466;     Gait Trainin     mins  47095;     Ultrasound:     0     mins  58318;    Electrical Stimulation:    0     mins  14422 ( );  Dry Needling     0     mins self-pay    Timed Treatment:   37   mins   Total Treatment:     37   mins

## 2024-08-21 DIAGNOSIS — M54.50 CHRONIC BILATERAL LOW BACK PAIN WITHOUT SCIATICA: ICD-10-CM

## 2024-08-21 DIAGNOSIS — G89.29 CHRONIC BILATERAL LOW BACK PAIN WITHOUT SCIATICA: ICD-10-CM

## 2024-08-22 RX ORDER — CELECOXIB 200 MG/1
200 CAPSULE ORAL DAILY
Qty: 30 CAPSULE | Refills: 0 | Status: SHIPPED | OUTPATIENT
Start: 2024-08-22

## 2024-08-27 DIAGNOSIS — C34.92 ADENOCARCINOMA OF LEFT LUNG: Primary | ICD-10-CM

## 2024-08-28 ENCOUNTER — LAB (OUTPATIENT)
Dept: LAB | Facility: HOSPITAL | Age: 64
End: 2024-08-28
Payer: COMMERCIAL

## 2024-08-28 ENCOUNTER — OFFICE VISIT (OUTPATIENT)
Dept: ONCOLOGY | Facility: CLINIC | Age: 64
End: 2024-08-28
Payer: COMMERCIAL

## 2024-08-28 VITALS
TEMPERATURE: 98.5 F | HEIGHT: 62 IN | OXYGEN SATURATION: 97 % | BODY MASS INDEX: 23.26 KG/M2 | SYSTOLIC BLOOD PRESSURE: 154 MMHG | HEART RATE: 50 BPM | RESPIRATION RATE: 16 BRPM | DIASTOLIC BLOOD PRESSURE: 86 MMHG | WEIGHT: 126.4 LBS

## 2024-08-28 DIAGNOSIS — C34.92 ADENOCARCINOMA OF LEFT LUNG: ICD-10-CM

## 2024-08-28 DIAGNOSIS — C34.11 MALIGNANT NEOPLASM OF UPPER LOBE OF RIGHT LUNG: Primary | ICD-10-CM

## 2024-08-28 LAB
ALBUMIN SERPL-MCNC: 3.9 G/DL (ref 3.5–5.2)
ALBUMIN/GLOB SERPL: 1.3 G/DL
ALP SERPL-CCNC: 78 U/L (ref 39–117)
ALT SERPL W P-5'-P-CCNC: 9 U/L (ref 1–33)
ANION GAP SERPL CALCULATED.3IONS-SCNC: 8.5 MMOL/L (ref 5–15)
AST SERPL-CCNC: 19 U/L (ref 1–32)
BASOPHILS # BLD AUTO: 0.04 10*3/MM3 (ref 0–0.2)
BASOPHILS NFR BLD AUTO: 0.5 % (ref 0–1.5)
BILIRUB SERPL-MCNC: 0.4 MG/DL (ref 0–1.2)
BUN SERPL-MCNC: 10 MG/DL (ref 8–23)
BUN/CREAT SERPL: 11.6 (ref 7–25)
CALCIUM SPEC-SCNC: 9 MG/DL (ref 8.6–10.5)
CHLORIDE SERPL-SCNC: 109 MMOL/L (ref 98–107)
CO2 SERPL-SCNC: 26.5 MMOL/L (ref 22–29)
CREAT SERPL-MCNC: 0.86 MG/DL (ref 0.57–1)
DEPRECATED RDW RBC AUTO: 41.4 FL (ref 37–54)
EGFRCR SERPLBLD CKD-EPI 2021: 76 ML/MIN/1.73
EOSINOPHIL # BLD AUTO: 0.23 10*3/MM3 (ref 0–0.4)
EOSINOPHIL NFR BLD AUTO: 3.2 % (ref 0.3–6.2)
ERYTHROCYTE [DISTWIDTH] IN BLOOD BY AUTOMATED COUNT: 13.7 % (ref 12.3–15.4)
GLOBULIN UR ELPH-MCNC: 3 GM/DL
GLUCOSE SERPL-MCNC: 118 MG/DL (ref 65–99)
HCT VFR BLD AUTO: 40.7 % (ref 34–46.6)
HGB BLD-MCNC: 13.5 G/DL (ref 12–15.9)
IMM GRANULOCYTES # BLD AUTO: 0.02 10*3/MM3 (ref 0–0.05)
IMM GRANULOCYTES NFR BLD AUTO: 0.3 % (ref 0–0.5)
LYMPHOCYTES # BLD AUTO: 3.32 10*3/MM3 (ref 0.7–3.1)
LYMPHOCYTES NFR BLD AUTO: 45.5 % (ref 19.6–45.3)
MCH RBC QN AUTO: 27.6 PG (ref 26.6–33)
MCHC RBC AUTO-ENTMCNC: 33.2 G/DL (ref 31.5–35.7)
MCV RBC AUTO: 83.1 FL (ref 79–97)
MONOCYTES # BLD AUTO: 0.36 10*3/MM3 (ref 0.1–0.9)
MONOCYTES NFR BLD AUTO: 4.9 % (ref 5–12)
NEUTROPHILS NFR BLD AUTO: 3.32 10*3/MM3 (ref 1.7–7)
NEUTROPHILS NFR BLD AUTO: 45.6 % (ref 42.7–76)
NRBC BLD AUTO-RTO: 0 /100 WBC (ref 0–0.2)
PLATELET # BLD AUTO: 263 10*3/MM3 (ref 140–450)
PMV BLD AUTO: 8.7 FL (ref 6–12)
POTASSIUM SERPL-SCNC: 3.5 MMOL/L (ref 3.5–5.2)
PROT SERPL-MCNC: 6.9 G/DL (ref 6–8.5)
RBC # BLD AUTO: 4.9 10*6/MM3 (ref 3.77–5.28)
SODIUM SERPL-SCNC: 144 MMOL/L (ref 136–145)
WBC NRBC COR # BLD AUTO: 7.29 10*3/MM3 (ref 3.4–10.8)

## 2024-08-28 PROCEDURE — 85025 COMPLETE CBC W/AUTO DIFF WBC: CPT

## 2024-08-28 PROCEDURE — 99213 OFFICE O/P EST LOW 20 MIN: CPT | Performed by: INTERNAL MEDICINE

## 2024-08-28 PROCEDURE — 36415 COLL VENOUS BLD VENIPUNCTURE: CPT

## 2024-08-28 PROCEDURE — 80053 COMPREHEN METABOLIC PANEL: CPT

## 2024-08-28 NOTE — PROGRESS NOTES
Subjective .    REASONS FOR FOLLOWUP:    1. Nonsmall cell lung carcinoma, stage IIA (down staged from presumed stage IIIA). The patient received neoadjuvant chemotherapy with carboplatin/Alimta x2. Followup scans showed considerable response. The patient is status post right upper lobectomy and lymphadenectomy (discharged 03/07/2012). Per Thoracic Conference, additional two cycles of adjuvant carboplatin/Alimta was advised and initiated on 04/11/2012. She has since remained in remission.     2.  Chest x-ray February 2004, July 20 2020-left hilar nodule 10 mm reduced to 6 mm    3.  Low-dose CT scan without evidence of recurrent disease 5/17/2021    4.  Patient seen 5/9/2022 with bilateral hip pain, abdominal swelling    5.  Patient assessed by CT chest and pelvis at [facility] 5/24/2022, pulmonary nodule, bilateral adrenal nodules, follow-up scans in 3 months planned, smoking cessation planned    6.  Patient reassessed CT chest abdomen pelvis 11/1/2022-no interval change with metastatic disease in chest abdomen pelvis.  Patient proceeding to smoking cessation.    7.  Patient seen 2/23/2023, primary care assessment with ultrasound of thyroid, MRI lumbar spine.  Patient status post endoscopy with polyps removed.    8.  Patient seen 9/7/2023 with pain management continuing, restart of additional anti-inflammatory.  No evidence of metastatic disease    9.  Insurance reinstated, rereferral back to pain management 1/15/2024    10.  Plans for annual branch block and RFA therapy 3/6/2024    11.  Patient seen 8/28/2024, stable, follow-up low-dose chest CT planned            History of Present Illness    The patient is a 64 y.o. female with the above-mentioned history, who returns the office today for 3-month follow-up.  She remains in observation in regards to her malignancy.  She did undergo surveillance CT scan July 2019 with a left lower lobe nodule, 1.1 cm noted.  She then underwent PET scan 7/24/2019 with no  metabolic activity identified.   The patient reports today she overall feels very well.  She does have some chronic shortness of breath on exertion though recovers quickly with rest.  She has been exercising and changed her diet, therefore has lost some weight.  She continues to have a chronic cough with occasional productive clear sputum.  She is attempting to stop smoking, though continues to smoke 1 pack/day.  She was recently prescribed Chantix by pulmonology.  She denies any new pain.  The patient is next seen January 2, 2020.  Unfortunately she is no longer covered by insurance though she remains, understandably, concerned about a previously noted left lower lobe pulmonary nodule.  A follow-up scan was scheduled though she is not able to cover that currently financially.      Plans were made for reassessment and the patient is next seen February 12, 2020 with repeat chest x-ray showing again a 1 cm lung nodule projecting just lateral to the left hilum between the level of the posterior left eighth and ninth ribs.  Fortunately she has not further symptomatic at this point additionally.  A follow-up chest x-ray was requested and obtained July 20, 2020 fortunately demonstrating a reduction in the left hilar nodule from 10 mm now to 6 mm.  She continues to have ongoing back pain indicating that insurance coverage has lapsed.        We elected to obtain a follow-up chest x-ray the patient is seen March 1, 2021.  Fortunately she is feeling well and repeat chest x-ray February 22, 2021 shows pulmonary hyperexpansion.  The previously noted lung nodules not noted left lower lobe or other suspicious lesion.  We have discussed follow-up low-dose CT scanning.    The patient underwent low-dose CT scan 5/17/2021 showing stable fairly dense pulmonary nodule left lower lobe that was thought to be granulomatous.  There were no other findings that might be suspicious for malignancy.  Patient, fortunately is feeling fair except  for chronic back pain.  This is being assessed by her primary care physician with additional films.    The patient is reassessed 11/7/2021.  Mammographic screening 10/11/2021 with a negative, chest x-ray was performed 11/6/2021 and is negative for new abnormalities.  Patient states that Dr. Wiley  MRI of the lumbar spine is reviewed result the patient's current low back pain    The patient is next seen, ever, 5/9/2022 indicating that she has been having increasing hip pain as well as abdominal swelling.  Her primary care had endeavor to have her undergo repeat scans that are not covered at [facility] and thus we will try to schedule as an outpatient at a [facility] facility.    Patient had her scans performed at [facility] CT abdomen pelvis 5/24/2022 demonstrating indeterminate 8 x 9 mm left lower lobe pulmonary nodule, indeterminate bilateral adrenal gland nodules measuring up to 1.4 cm.  There is no comparison studies done on these examinations.    The patient is seen 6/13/2022 discussed that she had previous scans 2015 showing a left adrenal adenoma but no abnormalities in the right adrenal gland.  Follow-up studies scheduled 3 months intervals [place].  Smoking cessation plan and pulmonary referral planned.    Patient seen by pulmonary medicine 6/17/2022-COPD with mild exacerbation noted, Chantix trial.    The patient is next assessed 11/9/2022 with CT chest abdomen pelvis reviewed showing no clear evidence of metastatic disease to the chest or pelvis, status post right upper lobectomy, 9 mm nodule superior segment left lower lobe without change, new onset metastatic disease in abdomen pelvis with 2 splenic cysts that are new, bilateral adrenal nodules without change.  The patient is seen formally 11/9/2022 fortunately feeling reasonably well though still having extremity pain that may be peripheral vascular disease and she is urged strongly to discontinue smoking altogether at this point.  She is  proceeding through nicotine lozenges.  Fortunately her scans, as above, do not demonstrate progressive malignancy.  She does describe a positive Cologuard exam however and has been urged to proceed to colonoscopy which is now being scheduled.    The patient is next assessed 2/23/2023 having issues with back pain leading to primary care to assess an MRI of the lumbar spine now pending as well as ultrasound of thyroid after further thyromegaly noted, normal TFTs.  Notably she proceeded through colonoscopy with 2 polyps removed 1/10/2023-tubular adenoma in ascending colon, hyperplastic polyp in the rectum.    Subsequent MRI testing 3/24/2023 demonstrated L2 with respect L3 diffuse annular disc bulge and posterior epidural fat contributing to moderate moderate narrowing of thecal sac, L3-L4 with mild to moderate bilateral facet overgrowth and diffuse posterior disc osteophyte complex.  Similar findings L4-5, L5 to/1 with moderate foraminal narrowing.  Patient felt to be candidate for epidural steroid injections and medial branch blockade now also anticipated.  There is, fortunately, no evidence of metastatic disease present though the patient is quite symptomatic per back pain.    The patient did undergo therapy through pain management to modest improvement, loss of insurance just prior to her last trial.  Her insurance has now been reinstated when she is seen 1/15/2024 and her back pain persists.  We have discussed repeat pain management assessment at this point.      Patient assessed 2/26/2024 with plans for annual branch block and RFA therapy 3/6/2024    Unfortunately the patient underwent further procedures with pain management on 4/15/2024, and she feels that her pain actually worsened following this procedure.  She states that she will not undergo any further procedures with pain management will continue on oral pain medication instead.  She feels that her pain is relatively controlled with this.  She continues  with chronic cough, continues smoking.  She denies any new bone pain.  She denies shortness of breath.  Aside from her pain, she has no new concerns today.    The patient did proceed to RFA therapy and branch block unfortunately without substantial improvement in symptoms.  As she is seen back 8/28/2024 she also discusses that her significant other has been diagnosed with apparent non-small cell lung cancer and is struggling to try to control the disease.    Past Medical History:   Diagnosis Date    Asthma     Bleeding of blood vessel 01/05/2011    BRAIN    Carpal tunnel syndrome on right     COPD (chronic obstructive pulmonary disease)     MODERATE    DDD (degenerative disc disease), cervical 09/22/2014    MODERATE C6-C7, SEEING DR. EWING    Drug therapy     lung ca    Emphysema of lung     Fibroids     UTERINE X 2    Hx of radiation therapy     lung ca    Lung cancer     MI (myocardial infarction) 06/2009    Mitral insufficiency     Non-small cell carcinoma of lung     Stabe IIA    Ovarian cyst     RIGHT    Pain of right upper extremity 11/18/2015    Pelvic pain     Positive colorectal cancer screening using Cologuard test     Right shoulder pain     SOB (shortness of breath)     Thyroid nodule     Vertigo 05/13/2015    Weight loss        ONCOLOGIC HISTORY:  (History from previous dates can be found in the separate document.)    Current Outpatient Medications on File Prior to Visit   Medication Sig Dispense Refill    albuterol sulfate HFA (Ventolin HFA) 108 (90 Base) MCG/ACT inhaler Inhale 2 puffs Every 4 (Four) Hours As Needed for Wheezing. 17 g 3    Anoro Ellipta 62.5-25 MCG/ACT aerosol powder  inhaler 1 puff As Needed.      celecoxib (CeleBREX) 200 MG capsule Take 1 capsule by mouth once daily 30 capsule 0     No current facility-administered medications on file prior to visit.       ALLERGIES:     Allergies   Allergen Reactions    Codeine Irritability       Social History     Socioeconomic History    Marital  "status:     Years of education: College   Tobacco Use    Smoking status: Every Day     Current packs/day: 0.50     Average packs/day: 0.5 packs/day for 52.7 years (26.3 ttl pk-yrs)     Types: Cigarettes     Start date: 1972     Passive exposure: Current    Smokeless tobacco: Never    Tobacco comments:     11/28/22 - reports 3 cigs/day + 3 lozenges/day   Vaping Use    Vaping status: Never Used   Substance and Sexual Activity    Alcohol use: Yes     Comment: OCCASIONAL    Drug use: Yes     Types: Marijuana    Sexual activity: Not Currently     Partners: Male     Birth control/protection: Post-menopausal         Cancer-related family history includes Brain cancer in her father; Cancer (age of onset: 47) in her mother. There is no history of Breast cancer.      Review of Systems   Constitutional:  Negative for chills, fatigue and fever.   HENT:  Negative for mouth sores and sore throat.    Eyes:  Negative for visual disturbance.   Respiratory:  Negative for cough, chest tightness, shortness of breath (chronic unchanged) and wheezing.    Cardiovascular:  Negative for chest pain and leg swelling.   Gastrointestinal:  Negative for abdominal pain, constipation and vomiting.   Genitourinary:  Negative for dysuria and frequency.   Musculoskeletal:  Positive for back pain, joint swelling, myalgias and neck pain.   Neurological:  Positive for numbness. Negative for dizziness and weakness.   Hematological:  Does not bruise/bleed easily.   Psychiatric/Behavioral:  The patient is not nervous/anxious.    All other systems reviewed and are negative.  Review of systems 09/18/2019  unchanged from previous office visit except as updated.       Objective      Vitals:    08/28/24 1602   BP: 154/86   Pulse: 50   Resp: 16   Temp: 98.5 °F (36.9 °C)   TempSrc: Oral   SpO2: 97%   Weight: 57.3 kg (126 lb 6.4 oz)   Height: 157.5 cm (62.01\")   PainSc: 0-No pain                 8/28/2024     4:06 PM   Current Status   ECOG score 0 "       Physical Exam    GENERAL:  female alert and oriented.   SKIN: Warm, dry without rashes, purpura or petechiae.   HEAD: Normocephalic.   EYES: Pupils equal, round and reactive to light. EOMs intact. Conjunctivae normal.   EARS: Hearing intact.   NOSE: Septum midline. No excoriations or nasal discharge.   MOUTH: Tongue is well-papillated; no stomatitis or ulcers. Lips normal.   THROAT: Oropharynx without lesions or exudates.   NECK: Supple with good range of motion; progressive thyromegaly noted, no JVD or bruits.   LYMPHATICS: No cervical, supraclavicular adenopathy.   CHEST: Lungs clear to auscultation, prolonged expiratory phase noted bilaterally.   CARDIAC: Regular rate and rhythm without murmurs, rubs or gallops. Bowel sounds present.  ABDOMEN: Soft, nontender with no organomegaly or masses.   EXTREMITIES: No clubbing, cyanosis or edema.   NEUROLOGICAL: No focal neurological deficits. .    RECENT LABS:  Results from last 7 days   Lab Units 08/28/24  1546   WBC 10*3/mm3 7.29   NEUTROS ABS 10*3/mm3 3.32   HEMOGLOBIN g/dL 13.5   HEMATOCRIT % 40.7   PLATELETS 10*3/mm3 263              SCANNED - IMAGING (05/24/2022)      Assessment plan;        1. Nonsmall cell lung carcinoma, stage IIA (down staged from presumed stage IIIA).  Staging MRI of the brain negative. The patient received neoadjuvant chemotherapy with carboplatin/Alimta x2. Followup scans showed considerable response. The patient is status post right upper lobectomy and lymphadenectomy (discharged 03/07/2012). Per Thoracic Conference, additional two cycles of adjuvant carboplatin/Alimta was advised and initiated on 04/11/2012. She has since remained in remission.    Low-dose screening CT of the chest July 2018 with a 1.1 left lower lobe nodule noted.  Follow-up PET scan the nodule was photopenic.  The patient has a follow-up review now in February 2020 after we have her apply for assistance with [employer].  A chest x-ray be  requested in 5 weeks to see the physician in 6 weeks.  This proceeded with patient assessed February 12, 2020 with chest x-ray February 4  not significantly changed from previous.  At this point will reassess again at 6 months with MD, chest x-ray CBC and CMP.     This is reassessed September 30, 2020 with a left hilar nodule having dropped from 10 mm to 6 mm.  We will continue reassessment every 6 months.  The patient's follow-up chest x-ray February 22, 2021 is negative for abnormalities though low-dose CT scan is felt reasonable and be scheduled within the next 3 months.  She will be seen formally after the scan is done.  She agrees with this plan and follow-up.  The patient's follow-up low-dose CT chest done in follow-up 5/17/2021 was negative for recurrent disease.  There remains stable fairly dense pulmonary nodule left lower lobe thought to be a granuloma.  The patient had a follow-up chest x-ray 11/6/2021 without new abnormalities.  We discussed a repeat low-dose CT scan but she has an MRI possibly scheduled next several months of the lumbar spine.    Paced rhythm at 5/9/2022 with progressive symptoms of abdominal discomfort and swelling, her physical exam is not particularly abnormal but she is also having a little hip pain and has chronic issues with shortness of breath and cough as well as smoking.    Patient underwent scans at [facility] with CT chest abdomen pelvis 5/24/2022 demonstrating indeterminate 8 x 9 mm left lower lobe pulmonary nodule, indeterminate bilateral adrenal gland nodules measuring up to 1.4 cm.  There is no comparison studies done on these examinations.    The patient is seen 6/13/2022 discussed that she had previous scans 2015 showing a left adrenal adenoma but no abnormalities in the right adrenal gland.  Follow-up studies scheduled 3 months intervals [place].  Smoking cessation plan and pulmonary referral planned.  Patient assessed by follow-up CT chest abdomen pelvis  10/12/2022 without evidence of recurrent disease.    Subsequent MRI testing 3/24/2023 demonstrated L2 with respect L3 diffuse annular disc bulge and posterior epidural fat contributing to moderate moderate narrowing of thecal sac, L3-L4 with mild to moderate bilateral facet overgrowth and diffuse posterior disc osteophyte complex.  Similar findings L4-5, L5 to/1 with moderate foraminal narrowing.  Patient felt to be candidate for epidural steroid injections and medial branch blockade now also anticipated.  There is, fortunately, no evidence of metastatic disease present though the patient is quite symptomatic per back pain.    Patient is a 2/6/2024 follow-up anticipated 3 months.    She underwent further intervention with pain management 4/15/2024, unfortunately she felt her pain worsened after this procedure.  She does not plan to undergo any further procedures with pain management will continue on oral pain medication through pain management.    5/20/2024-continues with chronic cough, this is stable.  Denies any new bone pain, shortness of breath.    Patient seen 8/28/2024 with plans for low-dose chest CT to be scheduled in 6 months.    2.  COPD.  Currently without symptoms of exacerbation.  She does see pulmonology and a follow-up will be scheduled.    3.  Smoking cessation.  Patient currently continuing with nicotine lozenges     4. Chronic pain. The patient is seen and managed by pain management.  Recent increase in back pain noted by primary care with subsequent MRI testing 3/24/2023 demonstrated L2 with respect L3 diffuse annular disc bulge and posterior epidural fat contributing to moderate moderate narrowing of thecal sac, L3-L4 with mild to moderate bilateral facet overgrowth and diffuse posterior disc osteophyte complex.  Similar findings L4-5, L5 to/1 with moderate foraminal narrowing.  Patient felt to be candidate for epidural steroid injections and medial branch blockade now also anticipated.  There  is, fortunately, no evidence of metastatic disease present though the patient is quite symptomatic per back pain.  The patient did undergo therapy through pain management to modest improvement, loss of insurance just prior to her last trial.  Her insurance has now been reinstated when she is seen 1/15/2024 and her back pain persists.  We have discussed repeat pain management assessment at this point.  Plans for branch block and RFA therapy 3/6/2024.  This proceeded with only modest improvement.    5.  Recent positive findings for Cologuard, colonoscopy as above with polyps removed including ascending colon tubular adenoma and hyperplastic polyp in the rectum.    6.  Thyroid ultrasound 3/24/2023 thyroid nodules-1 year follow-up indicated.    PLAN:  23 weeks low-dose chest CT  24 weeks MD

## 2024-09-16 DIAGNOSIS — M54.50 CHRONIC BILATERAL LOW BACK PAIN WITHOUT SCIATICA: ICD-10-CM

## 2024-09-16 DIAGNOSIS — G89.29 CHRONIC BILATERAL LOW BACK PAIN WITHOUT SCIATICA: ICD-10-CM

## 2024-09-17 RX ORDER — CELECOXIB 200 MG/1
200 CAPSULE ORAL DAILY
Qty: 30 CAPSULE | Refills: 1 | Status: SHIPPED | OUTPATIENT
Start: 2024-09-17

## 2024-10-04 ENCOUNTER — DOCUMENTATION (OUTPATIENT)
Dept: PHYSICAL THERAPY | Facility: CLINIC | Age: 64
End: 2024-10-04
Payer: COMMERCIAL

## 2025-02-05 ENCOUNTER — TELEPHONE (OUTPATIENT)
Dept: ONCOLOGY | Facility: CLINIC | Age: 65
End: 2025-02-05
Payer: COMMERCIAL

## 2025-02-05 NOTE — TELEPHONE ENCOUNTER
Caller: Hatchett, Yolanda Lee    Relationship to patient: Self    Best call back number: [phone]     Chief complaint: PATIENT TO RESCHEDULE 2/12/25 APPTS    Type of visit: LAB AND FU1    Requested date: AFTER SCANS ON 2/20/25

## 2025-03-09 ENCOUNTER — HOSPITAL ENCOUNTER (OUTPATIENT)
Facility: HOSPITAL | Age: 65
Discharge: HOME | End: 2025-03-09
Admitting: INTERNAL MEDICINE
Payer: COMMERCIAL

## 2025-03-09 DIAGNOSIS — C34.11 MALIGNANT NEOPLASM OF UPPER LOBE OF RIGHT LUNG: ICD-10-CM

## 2025-03-09 PROCEDURE — 71271 CT THORAX LUNG CANCER SCR C-: CPT

## 2025-03-14 NOTE — PROGRESS NOTES
Subjective .    REASONS FOR FOLLOWUP:    1. Nonsmall cell lung carcinoma, stage IIA (down staged from presumed stage IIIA). The patient received neoadjuvant chemotherapy with carboplatin/Alimta x2. Followup scans showed considerable response. The patient is status post right upper lobectomy and lymphadenectomy (discharged 03/07/2012). Per Thoracic Conference, additional two cycles of adjuvant carboplatin/Alimta was advised and initiated on 04/11/2012. She has since remained in remission.     2.  Chest x-ray February 2004, July 20 2020-left hilar nodule 10 mm reduced to 6 mm    3.  Low-dose CT scan without evidence of recurrent disease 5/17/2021    4.  Patient seen 5/9/2022 with bilateral hip pain, abdominal swelling    5.  Patient assessed by CT chest and pelvis at [facility] 5/24/2022, pulmonary nodule, bilateral adrenal nodules, follow-up scans in 3 months planned, smoking cessation planned    6.  Patient reassessed CT chest abdomen pelvis 11/1/2022-no interval change with metastatic disease in chest abdomen pelvis.  Patient proceeding to smoking cessation.    7.  Patient seen 2/23/2023, primary care assessment with ultrasound of thyroid, MRI lumbar spine.  Patient status post endoscopy with polyps removed.    8.  Patient seen 9/7/2023 with pain management continuing, restart of additional anti-inflammatory.  No evidence of metastatic disease    9.  Insurance reinstated, rereferral back to pain management 1/15/2024    10.  Plans for annual branch block and RFA therapy 3/6/2024    11.  Patient seen 8/28/2024, stable, follow-up low-dose chest CT planned    12.  Patient seen 3/17/2025-low-dose chest CT with negative findings.            History of Present Illness    The patient is a 65 y.o. female with the above-mentioned history, who returns the office today for 3-month follow-up.  She remains in observation in regards to her malignancy.  She did undergo surveillance CT scan July 2019 with a left lower  lobe nodule, 1.1 cm noted.  She then underwent PET scan 7/24/2019 with no metabolic activity identified.   The patient reports today she overall feels very well.  She does have some chronic shortness of breath on exertion though recovers quickly with rest.  She has been exercising and changed her diet, therefore has lost some weight.  She continues to have a chronic cough with occasional productive clear sputum.  She is attempting to stop smoking, though continues to smoke 1 pack/day.  She was recently prescribed Chantix by pulmonology.  She denies any new pain.  The patient is next seen January 2, 2020.  Unfortunately she is no longer covered by insurance though she remains, understandably, concerned about a previously noted left lower lobe pulmonary nodule.  A follow-up scan was scheduled though she is not able to cover that currently financially.      Plans were made for reassessment and the patient is next seen February 12, 2020 with repeat chest x-ray showing again a 1 cm lung nodule projecting just lateral to the left hilum between the level of the posterior left eighth and ninth ribs.  Fortunately she has not further symptomatic at this point additionally.  A follow-up chest x-ray was requested and obtained July 20, 2020 fortunately demonstrating a reduction in the left hilar nodule from 10 mm now to 6 mm.  She continues to have ongoing back pain indicating that insurance coverage has lapsed.        We elected to obtain a follow-up chest x-ray the patient is seen March 1, 2021.  Fortunately she is feeling well and repeat chest x-ray February 22, 2021 shows pulmonary hyperexpansion.  The previously noted lung nodules not noted left lower lobe or other suspicious lesion.  We have discussed follow-up low-dose CT scanning.    The patient underwent low-dose CT scan 5/17/2021 showing stable fairly dense pulmonary nodule left lower lobe that was thought to be granulomatous.  There were no other findings that might  be suspicious for malignancy.  Patient, fortunately is feeling fair except for chronic back pain.  This is being assessed by her primary care physician with additional films.    The patient is reassessed 11/7/2021.  Mammographic screening 10/11/2021 with a negative, chest x-ray was performed 11/6/2021 and is negative for new abnormalities.  Patient states that Dr. Wiley  MRI of the lumbar spine is reviewed result the patient's current low back pain    The patient is next seen, ever, 5/9/2022 indicating that she has been having increasing hip pain as well as abdominal swelling.  Her primary care had endeavor to have her undergo repeat scans that are not covered at [facility] and thus we will try to schedule as an outpatient at a [facility] facility.    Patient had her scans performed at [facility] CT abdomen pelvis 5/24/2022 demonstrating indeterminate 8 x 9 mm left lower lobe pulmonary nodule, indeterminate bilateral adrenal gland nodules measuring up to 1.4 cm.  There is no comparison studies done on these examinations.    The patient is seen 6/13/2022 discussed that she had previous scans 2015 showing a left adrenal adenoma but no abnormalities in the right adrenal gland.  Follow-up studies scheduled 3 months intervals [place].  Smoking cessation plan and pulmonary referral planned.    Patient seen by pulmonary medicine 6/17/2022-COPD with mild exacerbation noted, Chantix trial.    The patient is next assessed 11/9/2022 with CT chest abdomen pelvis reviewed showing no clear evidence of metastatic disease to the chest or pelvis, status post right upper lobectomy, 9 mm nodule superior segment left lower lobe without change, new onset metastatic disease in abdomen pelvis with 2 splenic cysts that are new, bilateral adrenal nodules without change.  The patient is seen formally 11/9/2022 fortunately feeling reasonably well though still having extremity pain that may be peripheral vascular disease and she  is urged strongly to discontinue smoking altogether at this point.  She is proceeding through nicotine lozenges.  Fortunately her scans, as above, do not demonstrate progressive malignancy.  She does describe a positive Cologuard exam however and has been urged to proceed to colonoscopy which is now being scheduled.    The patient is next assessed 2/23/2023 having issues with back pain leading to primary care to assess an MRI of the lumbar spine now pending as well as ultrasound of thyroid after further thyromegaly noted, normal TFTs.  Notably she proceeded through colonoscopy with 2 polyps removed 1/10/2023-tubular adenoma in ascending colon, hyperplastic polyp in the rectum.    Subsequent MRI testing 3/24/2023 demonstrated L2 with respect L3 diffuse annular disc bulge and posterior epidural fat contributing to moderate moderate narrowing of thecal sac, L3-L4 with mild to moderate bilateral facet overgrowth and diffuse posterior disc osteophyte complex.  Similar findings L4-5, L5 to/1 with moderate foraminal narrowing.  Patient felt to be candidate for epidural steroid injections and medial branch blockade now also anticipated.  There is, fortunately, no evidence of metastatic disease present though the patient is quite symptomatic per back pain.    The patient did undergo therapy through pain management to modest improvement, loss of insurance just prior to her last trial.  Her insurance has now been reinstated when she is seen 1/15/2024 and her back pain persists.  We have discussed repeat pain management assessment at this point.      Patient assessed 2/26/2024 with plans for annual branch block and RFA therapy 3/6/2024    Unfortunately the patient underwent further procedures with pain management on 4/15/2024, and she feels that her pain actually worsened following this procedure.  She states that she will not undergo any further procedures with pain management will continue on oral pain medication instead.   She feels that her pain is relatively controlled with this.  She continues with chronic cough, continues smoking.  She denies any new bone pain.  She denies shortness of breath.  Aside from her pain, she has no new concerns today.    The patient did proceed to RFA therapy and branch block unfortunately without substantial improvement in symptoms.  As she is seen back 2024 she also discusses that her significant other has been diagnosed with apparent non-small cell lung cancer and is struggling to try to control the disease.    A follow-up CT yearly was requested and performed 3/9/2025.  She is seen with her daughter in office 3/17/2025.  Her , fortunately, recently  that she is trying to come to  with this.  The scan, fortunately, does not demonstrate any recurrent disease with a stable benign partially calcified granuloma in the apical segment left lower lobe and scattered sub-4 mm pulmonary nodules in both lungs.    Past Medical History:   Diagnosis Date    Asthma     Bleeding of blood vessel 2011    BRAIN    Carpal tunnel syndrome on right     COPD (chronic obstructive pulmonary disease)     MODERATE    DDD (degenerative disc disease), cervical 2014    MODERATE C6-C7, SEEING DR. EWING    Drug therapy     lung ca    Emphysema of lung     Fibroids     UTERINE X 2    Hx of radiation therapy     lung ca    Lung cancer     MI (myocardial infarction) 2009    Mitral insufficiency     Non-small cell carcinoma of lung     Stabe IIA    Ovarian cyst     RIGHT    Pain of right upper extremity 2015    Pelvic pain     Positive colorectal cancer screening using Cologuard test     Right shoulder pain     SOB (shortness of breath)     Thyroid nodule     Vertigo 2015    Weight loss        ONCOLOGIC HISTORY:  (History from previous dates can be found in the separate document.)    Current Outpatient Medications on File Prior to Visit   Medication Sig Dispense Refill    albuterol  sulfate HFA (Ventolin HFA) 108 (90 Base) MCG/ACT inhaler Inhale 2 puffs Every 4 (Four) Hours As Needed for Wheezing. 17 g 3    Anoro Ellipta 62.5-25 MCG/ACT aerosol powder  inhaler 1 puff As Needed.      celecoxib (CeleBREX) 200 MG capsule Take 1 capsule by mouth once daily 30 capsule 1     No current facility-administered medications on file prior to visit.       ALLERGIES:     Allergies   Allergen Reactions    Codeine Irritability       Social History     Socioeconomic History    Marital status:     Years of education: College   Tobacco Use    Smoking status: Every Day     Current packs/day: 0.50     Average packs/day: 0.5 packs/day for 53.2 years (26.6 ttl pk-yrs)     Types: Cigarettes     Start date: 1972     Passive exposure: Current    Smokeless tobacco: Never    Tobacco comments:     11/28/22 - reports 3 cigs/day + 3 lozenges/day   Vaping Use    Vaping status: Never Used   Substance and Sexual Activity    Alcohol use: Yes     Comment: OCCASIONAL    Drug use: Yes     Types: Marijuana    Sexual activity: Not Currently     Partners: Male     Birth control/protection: Post-menopausal         Cancer-related family history includes Brain cancer in her father; Cancer (age of onset: 47) in her mother. There is no history of Breast cancer.      Review of Systems   Constitutional:  Negative for chills, fatigue and fever.   HENT:  Negative for mouth sores and sore throat.    Eyes:  Negative for visual disturbance.   Respiratory:  Negative for cough, chest tightness, shortness of breath (chronic unchanged) and wheezing.    Cardiovascular:  Negative for chest pain and leg swelling.   Gastrointestinal:  Negative for abdominal pain, constipation and vomiting.   Genitourinary:  Negative for dysuria and frequency.   Musculoskeletal:  Positive for back pain, joint swelling, myalgias and neck pain.   Neurological:  Positive for numbness. Negative for dizziness and weakness.   Hematological:  Does not bruise/bleed easily.  "  Psychiatric/Behavioral:  The patient is not nervous/anxious.    All other systems reviewed and are negative.  Review of systems 09/18/2019  unchanged from previous office visit except as updated.       Objective      Vitals:    03/17/25 1258   BP: 123/72   Pulse: 66   Resp: 16   Temp: 98.3 °F (36.8 °C)   TempSrc: Oral   SpO2: 98%   Weight: 50.6 kg (111 lb 8 oz)   Height: 157.5 cm (62.01\")   PainSc: 7    PainLoc: Back                   3/17/2025    12:58 PM   Current Status   ECOG score 0       Physical Exam    GENERAL:  female alert and oriented.   SKIN: Warm, dry without rashes, purpura or petechiae.   HEAD: Normocephalic.   EYES: Pupils equal, round and reactive to light. EOMs intact. Conjunctivae normal.   EARS: Hearing intact.   NOSE: Septum midline. No excoriations or nasal discharge.   MOUTH: Tongue is well-papillated; no stomatitis or ulcers. Lips normal.   THROAT: Oropharynx without lesions or exudates.   NECK: Supple with good range of motion; progressive thyromegaly noted, no JVD or bruits.   LYMPHATICS: No cervical, supraclavicular adenopathy.   CHEST: Lungs clear to auscultation, prolonged expiratory phase noted bilaterally.   CARDIAC: Regular rate and rhythm without murmurs, rubs or gallops. Bowel sounds present.  ABDOMEN: Soft, nontender with no organomegaly or masses.   EXTREMITIES: No clubbing, cyanosis or edema.   NEUROLOGICAL: No focal neurological deficits. .    RECENT LABS:  Results from last 7 days   Lab Units 03/17/25  1258   WBC 10*3/mm3 5.12   NEUTROS ABS 10*3/mm3 2.45   HEMOGLOBIN g/dL 13.1   HEMATOCRIT % 39.7   PLATELETS 10*3/mm3 279                SCANNED - IMAGING (05/24/2022)      Assessment plan;        1. Nonsmall cell lung carcinoma, stage IIA (down staged from presumed stage IIIA).  Staging MRI of the brain negative. The patient received neoadjuvant chemotherapy with carboplatin/Alimta x2. Followup scans showed considerable response. The patient is status post right " upper lobectomy and lymphadenectomy (discharged 03/07/2012). Per Thoracic Conference, additional two cycles of adjuvant carboplatin/Alimta was advised and initiated on 04/11/2012. She has since remained in remission.    Low-dose screening CT of the chest July 2018 with a 1.1 left lower lobe nodule noted.  Follow-up PET scan the nodule was photopenic.  The patient has a follow-up review now in February 2020 after we have her apply for assistance with [facility].  A chest x-ray be requested in 5 weeks to see the physician in 6 weeks.  This proceeded with patient assessed February 12, 2020 with chest x-ray February 4  not significantly changed from previous.  At this point will reassess again at 6 months with MD, chest x-ray CBC and CMP.     This is reassessed September 30, 2020 with a left hilar nodule having dropped from 10 mm to 6 mm.  We will continue reassessment every 6 months.  The patient's follow-up chest x-ray February 22, 2021 is negative for abnormalities though low-dose CT scan is felt reasonable and be scheduled within the next 3 months.  She will be seen formally after the scan is done.  She agrees with this plan and follow-up.  The patient's follow-up low-dose CT chest done in follow-up 5/17/2021 was negative for recurrent disease.  There remains stable fairly dense pulmonary nodule left lower lobe thought to be a granuloma.  The patient had a follow-up chest x-ray 11/6/2021 without new abnormalities.  We discussed a repeat low-dose CT scan but she has an MRI possibly scheduled next several months of the lumbar spine.    Paced rhythm at 5/9/2022 with progressive symptoms of abdominal discomfort and swelling, her physical exam is not particularly abnormal but she is also having a little hip pain and has chronic issues with shortness of breath and cough as well as smoking.    Patient underwent scans at [facility] with CT chest abdomen pelvis 5/24/2022 demonstrating indeterminate 8 x 9  mm left lower lobe pulmonary nodule, indeterminate bilateral adrenal gland nodules measuring up to 1.4 cm.  There is no comparison studies done on these examinations.    The patient is seen 2022 discussed that she had previous scans  showing a left adrenal adenoma but no abnormalities in the right adrenal gland.  Follow-up studies scheduled 3 months intervals [place].  Smoking cessation plan and pulmonary referral planned.  Patient assessed by follow-up CT chest abdomen pelvis 10/12/2022 without evidence of recurrent disease.    Subsequent MRI testing 3/24/2023 demonstrated L2 with respect L3 diffuse annular disc bulge and posterior epidural fat contributing to moderate moderate narrowing of thecal sac, L3-L4 with mild to moderate bilateral facet overgrowth and diffuse posterior disc osteophyte complex.  Similar findings L4-5, L5 to/1 with moderate foraminal narrowing.  Patient felt to be candidate for epidural steroid injections and medial branch blockade now also anticipated.  There is, fortunately, no evidence of metastatic disease present though the patient is quite symptomatic per back pain.    Patient is a 2024 follow-up anticipated 3 months.    She underwent further intervention with pain management 4/15/2024, unfortunately she felt her pain worsened after this procedure.  She does not plan to undergo any further procedures with pain management will continue on oral pain medication through pain management.    2024-continues with chronic cough, this is stable.  Denies any new bone pain, shortness of breath.    Patient seen 2024 with plans for low-dose chest CT to be scheduled in 6 months.    A follow-up CT yearly was requested and performed 3/9/2025.  She is seen with her daughter in office 3/17/2025.  Her , fortunately, recently  that she is trying to come to  with this.  The scan, fortunately, does not demonstrate any recurrent disease with a stable benign partially  calcified granuloma in the apical segment left lower lobe and scattered sub-4 mm pulmonary nodules in both lungs.    2.  COPD.  Currently without symptoms of exacerbation.  She does see pulmonology and a follow-up will be scheduled.    3.  Smoking cessation.  Patient currently continuing with nicotine lozenges     4. Chronic pain. The patient is seen and managed by pain management.  Recent increase in back pain noted by primary care with subsequent MRI testing 3/24/2023 demonstrated L2 with respect L3 diffuse annular disc bulge and posterior epidural fat contributing to moderate moderate narrowing of thecal sac, L3-L4 with mild to moderate bilateral facet overgrowth and diffuse posterior disc osteophyte complex.  Similar findings L4-5, L5 to/1 with moderate foraminal narrowing.  Patient felt to be candidate for epidural steroid injections and medial branch blockade now also anticipated.  There is, fortunately, no evidence of metastatic disease present though the patient is quite symptomatic per back pain.  The patient did undergo therapy through pain management to modest improvement, loss of insurance just prior to her last trial.  Her insurance has now been reinstated when she is seen 1/15/2024 and her back pain persists.  We have discussed repeat pain management assessment at this point.  Plans for branch block and RFA therapy 3/6/2024.  This proceeded with only modest improvement.    5.  Recent positive findings for Cologuard, colonoscopy as above with polyps removed including ascending colon tubular adenoma and hyperplastic polyp in the rectum.    6.  Thyroid ultrasound 3/24/2023 thyroid nodules-1 year follow-up indicated.    PLAN:  51 weeks low-dose chest CT  52 weeks MD

## 2025-03-17 ENCOUNTER — OFFICE VISIT (OUTPATIENT)
Dept: ONCOLOGY | Facility: CLINIC | Age: 65
End: 2025-03-17
Payer: COMMERCIAL

## 2025-03-17 ENCOUNTER — LAB (OUTPATIENT)
Dept: LAB | Facility: HOSPITAL | Age: 65
End: 2025-03-17
Payer: COMMERCIAL

## 2025-03-17 VITALS
HEART RATE: 66 BPM | SYSTOLIC BLOOD PRESSURE: 123 MMHG | WEIGHT: 111.5 LBS | OXYGEN SATURATION: 98 % | DIASTOLIC BLOOD PRESSURE: 72 MMHG | TEMPERATURE: 98.3 F | HEIGHT: 62 IN | BODY MASS INDEX: 20.52 KG/M2 | RESPIRATION RATE: 16 BRPM

## 2025-03-17 DIAGNOSIS — C34.92 ADENOCARCINOMA OF LEFT LUNG: ICD-10-CM

## 2025-03-17 DIAGNOSIS — C34.11 MALIGNANT NEOPLASM OF UPPER LOBE OF RIGHT LUNG: Primary | ICD-10-CM

## 2025-03-17 DIAGNOSIS — C34.11 MALIGNANT NEOPLASM OF UPPER LOBE OF RIGHT LUNG: ICD-10-CM

## 2025-03-17 LAB
ALBUMIN SERPL-MCNC: 3.9 G/DL (ref 3.5–5.2)
ALBUMIN/GLOB SERPL: 1.2 G/DL
ALP SERPL-CCNC: 60 U/L (ref 39–117)
ALT SERPL W P-5'-P-CCNC: 7 U/L (ref 1–33)
ANION GAP SERPL CALCULATED.3IONS-SCNC: 10.1 MMOL/L (ref 5–15)
AST SERPL-CCNC: 11 U/L (ref 1–32)
BASOPHILS # BLD AUTO: 0.03 10*3/MM3 (ref 0–0.2)
BASOPHILS NFR BLD AUTO: 0.6 % (ref 0–1.5)
BILIRUB SERPL-MCNC: 0.5 MG/DL (ref 0–1.2)
BUN SERPL-MCNC: 6 MG/DL (ref 8–23)
BUN/CREAT SERPL: 6.7 (ref 7–25)
CALCIUM SPEC-SCNC: 9.4 MG/DL (ref 8.6–10.5)
CHLORIDE SERPL-SCNC: 108 MMOL/L (ref 98–107)
CO2 SERPL-SCNC: 25.9 MMOL/L (ref 22–29)
CREAT SERPL-MCNC: 0.9 MG/DL (ref 0.57–1)
DEPRECATED RDW RBC AUTO: 44.8 FL (ref 37–54)
EGFRCR SERPLBLD CKD-EPI 2021: 71.5 ML/MIN/1.73
EOSINOPHIL # BLD AUTO: 0.09 10*3/MM3 (ref 0–0.4)
EOSINOPHIL NFR BLD AUTO: 1.8 % (ref 0.3–6.2)
ERYTHROCYTE [DISTWIDTH] IN BLOOD BY AUTOMATED COUNT: 14.8 % (ref 12.3–15.4)
GLOBULIN UR ELPH-MCNC: 3.2 GM/DL
GLUCOSE SERPL-MCNC: 98 MG/DL (ref 65–99)
HCT VFR BLD AUTO: 39.7 % (ref 34–46.6)
HGB BLD-MCNC: 13.1 G/DL (ref 12–15.9)
IMM GRANULOCYTES # BLD AUTO: 0.01 10*3/MM3 (ref 0–0.05)
IMM GRANULOCYTES NFR BLD AUTO: 0.2 % (ref 0–0.5)
LYMPHOCYTES # BLD AUTO: 2.15 10*3/MM3 (ref 0.7–3.1)
LYMPHOCYTES NFR BLD AUTO: 42 % (ref 19.6–45.3)
MCH RBC QN AUTO: 27.3 PG (ref 26.6–33)
MCHC RBC AUTO-ENTMCNC: 33 G/DL (ref 31.5–35.7)
MCV RBC AUTO: 82.7 FL (ref 79–97)
MONOCYTES # BLD AUTO: 0.39 10*3/MM3 (ref 0.1–0.9)
MONOCYTES NFR BLD AUTO: 7.6 % (ref 5–12)
NEUTROPHILS NFR BLD AUTO: 2.45 10*3/MM3 (ref 1.7–7)
NEUTROPHILS NFR BLD AUTO: 47.8 % (ref 42.7–76)
NRBC BLD AUTO-RTO: 0 /100 WBC (ref 0–0.2)
PLATELET # BLD AUTO: 279 10*3/MM3 (ref 140–450)
PMV BLD AUTO: 8.6 FL (ref 6–12)
POTASSIUM SERPL-SCNC: 3.5 MMOL/L (ref 3.5–5.2)
PROT SERPL-MCNC: 7.1 G/DL (ref 6–8.5)
RBC # BLD AUTO: 4.8 10*6/MM3 (ref 3.77–5.28)
SODIUM SERPL-SCNC: 144 MMOL/L (ref 136–145)
WBC NRBC COR # BLD AUTO: 5.12 10*3/MM3 (ref 3.4–10.8)

## 2025-03-17 PROCEDURE — 80053 COMPREHEN METABOLIC PANEL: CPT

## 2025-03-17 PROCEDURE — 36415 COLL VENOUS BLD VENIPUNCTURE: CPT

## 2025-03-17 PROCEDURE — 99213 OFFICE O/P EST LOW 20 MIN: CPT | Performed by: INTERNAL MEDICINE

## 2025-03-17 PROCEDURE — 85025 COMPLETE CBC W/AUTO DIFF WBC: CPT

## 2025-07-16 ENCOUNTER — OFFICE VISIT (OUTPATIENT)
Dept: FAMILY MEDICINE CLINIC | Facility: CLINIC | Age: 65
End: 2025-07-16
Payer: COMMERCIAL

## 2025-07-16 ENCOUNTER — TELEPHONE (OUTPATIENT)
Dept: FAMILY MEDICINE CLINIC | Facility: CLINIC | Age: 65
End: 2025-07-16
Payer: COMMERCIAL

## 2025-07-16 VITALS
HEART RATE: 77 BPM | TEMPERATURE: 98.4 F | BODY MASS INDEX: 19.14 KG/M2 | WEIGHT: 104 LBS | OXYGEN SATURATION: 97 % | DIASTOLIC BLOOD PRESSURE: 80 MMHG | HEIGHT: 62 IN | SYSTOLIC BLOOD PRESSURE: 140 MMHG

## 2025-07-16 DIAGNOSIS — R10.30 LOWER ABDOMINAL PAIN: ICD-10-CM

## 2025-07-16 DIAGNOSIS — R10.33 ACUTE PERIUMBILICAL PAIN: Primary | ICD-10-CM

## 2025-07-16 DIAGNOSIS — R22.2 LOCALIZED SWELLING OF ABDOMINAL WALL: ICD-10-CM

## 2025-07-16 LAB
BILIRUB BLD-MCNC: NEGATIVE MG/DL
CLARITY, POC: CLEAR
COLOR UR: YELLOW
EXPIRATION DATE: NORMAL
GLUCOSE UR STRIP-MCNC: NEGATIVE MG/DL
KETONES UR QL: NEGATIVE
LEUKOCYTE EST, POC: NEGATIVE
Lab: NORMAL
NITRITE UR-MCNC: NEGATIVE MG/ML
PH UR: 6 [PH] (ref 5–8)
PROT UR STRIP-MCNC: NEGATIVE MG/DL
RBC # UR STRIP: NEGATIVE /UL
SP GR UR: 1.01 (ref 1–1.03)
UROBILINOGEN UR QL: NORMAL

## 2025-07-16 PROCEDURE — 99214 OFFICE O/P EST MOD 30 MIN: CPT | Performed by: NURSE PRACTITIONER

## 2025-07-16 PROCEDURE — 81003 URINALYSIS AUTO W/O SCOPE: CPT | Performed by: NURSE PRACTITIONER

## 2025-07-16 NOTE — PROGRESS NOTES
"Chief Complaint  Cyst (Right side on waist; establish care), Fatigue, and Abdominal Pain    Subjective        HPI     KIRSTEN presents to [facility]IMARY [facility] for Lump on right side abdomen:    History of Present Illness  She reports a lump on the right side of her abdomen, which she first noticed approximately 3 weeks ago. This lump is located just below a previous incision from a lung surgery where her right lung was removed. About 1.5 weeks ago, she began experiencing central abdominal pain, which she describes as cramping and rates it as a 6 or 7 out of 10 in severity. The pain is intermittent and started around the same time she noticed the lump. She reports no fever, chills, nausea, vomiting, fatigue, or unusual weight changes. She has lost significant weight since her 's death on 03/1/2025, dropping from 122 pounds to 104 pounds. Despite this, she maintains a regular diet of two meals a day and snacks in between. She reports no bloating, changes in bowel habits, blood in stool, black tarry stools, red stools, or rectal pain. Her urination is normal with clear urine. She has not noticed any bulge in her abdomen prior to this episode.  She denies having any other concerns.    Social History:  Marital Status:   Diet: She eats breakfast, lunch at work, and cooks dinner at home. She also consumes junk food in between meals.    PAST SURGICAL HISTORY:  Right upper lobe of lung removal          Objective   Vital Signs:   Vitals:    07/16/25 1512   BP: 140/80   BP Location: Left arm   Patient Position: Sitting   Cuff Size: Small Adult   Pulse: 77   Temp: 98.4 °F (36.9 °C)   TempSrc: Oral   SpO2: 97%   Weight: 47.2 kg (104 lb)   Height: 157.5 cm (62.01\")       BMI is within normal parameters. No other follow-up for BMI required.        Physical Exam  Vitals and nursing note reviewed.   Constitutional:       General: She is not in acute distress.     Appearance: Normal appearance. She " is not ill-appearing, toxic-appearing or diaphoretic.   HENT:      Head: Normocephalic and atraumatic.   Cardiovascular:      Rate and Rhythm: Normal rate and regular rhythm.      Heart sounds: Normal heart sounds. No murmur heard.  Pulmonary:      Effort: Pulmonary effort is normal. No respiratory distress.      Breath sounds: Normal breath sounds. No wheezing.   Abdominal:      General: Abdomen is flat. Bowel sounds are normal. There is no distension.      Palpations: Abdomen is soft. There is no mass.      Tenderness: There is abdominal tenderness in the right lower quadrant, periumbilical area, suprapubic area and left lower quadrant. There is guarding. There is no right CVA tenderness, left CVA tenderness or rebound.       Musculoskeletal:      Right lower leg: No edema.      Left lower leg: No edema.   Neurological:      Mental Status: She is alert.   Psychiatric:         Mood and Affect: Mood normal.          Result Review :     The following data was reviewed by: NE Gomez on 07/16/2025:    POCT urinalysis dipstick, automated (07/16/2025 16:12)     CBC & Differential (03/17/2025 12:58)  Comprehensive Metabolic Panel (03/17/2025 12:58)     Assessment and Plan    Assessment & Plan  Acute periumbilical pain  Patient c/o periumbilical pain.  Labs  Order:  STAT CT Abd/Pelvis - today got patient scheduled for STAT CT on 7/17/25 at 10:45 at [place], but patient declined.  Patient rescheduled for 7/31/25 at 12:45 at [place].  Discussed red flags and when to seek emergency medical treatment (#911) and patient understood and agreed.  Follow-up in 1 week to re-assess abdominal pain.    Orders:    Comprehensive Metabolic Panel    CBC & Differential    Lipase    Amylase    CT Abdomen Pelvis With Contrast; Future    Lower abdominal pain  Labs  Order:  POCT U/A - WNL  Order:  STAT CT Abd/Pelvis  Discussed red flags and when to seek emergency medical treatment (#911) and patient understood and  "agreed.  Follow-up in 1 week to re-assess abdominal pain.    Orders:    Comprehensive Metabolic Panel    CBC & Differential    Lipase    Amylase    CT Abdomen Pelvis With Contrast; Future    POCT urinalysis dipstick, automated    Localized swelling of abdominal wall  Drink water, avoid ETOH and caffeine.  Decrease prepackaged food, fatty foods and foods high in sugar.  Avoid \"junk food\".  Labs  Order:  STAT CT Abd/Pelvis - today, got patient scheduled for STAT CT on 7/17/25 at 10:45 at [place], but patient declined.  Patient rescheduled for 7/31/25 at 12:45 at [place].  Discussed red flags and when to seek emergency medical treatment (#911) and patient understood and agreed.  Follow-up in 1 week to re-assess abdominal pain.              Follow Up   Return in about 1 week (around 7/23/2025) for Next scheduled follow up - Periumbilical pain.  Patient was given instructions and counseling regarding her condition or for health maintenance advice. Please see specific information pulled into the AVS if appropriate.   Patient or patient representative verbalized consent for the use of Ambient Listening during the visit with  NE Gomez for chart documentation. 7/16/25  15:37 EDT.  "

## 2025-07-16 NOTE — TELEPHONE ENCOUNTER
Attempted to get schedule w [facility] for stat CT 7/17/25 at 10:45 pt declined appt due to work schedule so the following  have been scheudled for 7/31/25 at 12:45     also got her scheduled to see dr padilla In one week as well , nicole was given [facility] and appt information

## 2025-07-17 LAB
ALBUMIN SERPL-MCNC: 4.3 G/DL (ref 3.5–5.2)
ALBUMIN/GLOB SERPL: 1.2 G/DL
ALP SERPL-CCNC: 66 U/L (ref 39–117)
ALT SERPL-CCNC: 15 U/L (ref 1–33)
AMYLASE SERPL-CCNC: 130 U/L (ref 28–100)
AST SERPL-CCNC: 21 U/L (ref 1–32)
BASOPHILS # BLD AUTO: 0.04 10*3/MM3 (ref 0–0.2)
BASOPHILS NFR BLD AUTO: 0.6 % (ref 0–1.5)
BILIRUB SERPL-MCNC: 0.4 MG/DL (ref 0–1.2)
BUN SERPL-MCNC: 14 MG/DL (ref 8–23)
BUN/CREAT SERPL: 16.1 (ref 7–25)
CALCIUM SERPL-MCNC: 10 MG/DL (ref 8.6–10.5)
CHLORIDE SERPL-SCNC: 105 MMOL/L (ref 98–107)
CO2 SERPL-SCNC: 28.3 MMOL/L (ref 22–29)
CREAT SERPL-MCNC: 0.87 MG/DL (ref 0.57–1)
EGFRCR SERPLBLD CKD-EPI 2021: 74.5 ML/MIN/1.73
EOSINOPHIL # BLD AUTO: 0.15 10*3/MM3 (ref 0–0.4)
EOSINOPHIL NFR BLD AUTO: 2.2 % (ref 0.3–6.2)
ERYTHROCYTE [DISTWIDTH] IN BLOOD BY AUTOMATED COUNT: 12.6 % (ref 12.3–15.4)
GLOBULIN SER CALC-MCNC: 3.5 GM/DL
GLUCOSE SERPL-MCNC: 89 MG/DL (ref 65–99)
HCT VFR BLD AUTO: 42.3 % (ref 34–46.6)
HGB BLD-MCNC: 13.5 G/DL (ref 12–15.9)
IMM GRANULOCYTES # BLD AUTO: 0.01 10*3/MM3 (ref 0–0.05)
IMM GRANULOCYTES NFR BLD AUTO: 0.1 % (ref 0–0.5)
LIPASE SERPL-CCNC: 24 U/L (ref 13–60)
LYMPHOCYTES # BLD AUTO: 3.15 10*3/MM3 (ref 0.7–3.1)
LYMPHOCYTES NFR BLD AUTO: 46.7 % (ref 19.6–45.3)
MCH RBC QN AUTO: 28 PG (ref 26.6–33)
MCHC RBC AUTO-ENTMCNC: 31.9 G/DL (ref 31.5–35.7)
MCV RBC AUTO: 87.8 FL (ref 79–97)
MONOCYTES # BLD AUTO: 0.46 10*3/MM3 (ref 0.1–0.9)
MONOCYTES NFR BLD AUTO: 6.8 % (ref 5–12)
NEUTROPHILS # BLD AUTO: 2.94 10*3/MM3 (ref 1.7–7)
NEUTROPHILS NFR BLD AUTO: 43.6 % (ref 42.7–76)
NRBC BLD AUTO-RTO: 0 /100 WBC (ref 0–0.2)
PLATELET # BLD AUTO: 314 10*3/MM3 (ref 140–450)
POTASSIUM SERPL-SCNC: 4.4 MMOL/L (ref 3.5–5.2)
PROT SERPL-MCNC: 7.8 G/DL (ref 6–8.5)
RBC # BLD AUTO: 4.82 10*6/MM3 (ref 3.77–5.28)
SODIUM SERPL-SCNC: 143 MMOL/L (ref 136–145)
WBC # BLD AUTO: 6.75 10*3/MM3 (ref 3.4–10.8)

## 2025-07-18 ENCOUNTER — HOSPITAL ENCOUNTER (EMERGENCY)
Facility: HOSPITAL | Age: 65
Discharge: HOME | End: 2025-07-18
Attending: EMERGENCY MEDICINE
Payer: COMMERCIAL

## 2025-07-18 ENCOUNTER — APPOINTMENT (OUTPATIENT)
Dept: CT IMAGING | Facility: HOSPITAL | Age: 65
End: 2025-07-18
Payer: COMMERCIAL

## 2025-07-18 VITALS
BODY MASS INDEX: 19.14 KG/M2 | HEIGHT: 62 IN | TEMPERATURE: 98.4 F | RESPIRATION RATE: 18 BRPM | OXYGEN SATURATION: 98 % | SYSTOLIC BLOOD PRESSURE: 156 MMHG | DIASTOLIC BLOOD PRESSURE: 85 MMHG | HEART RATE: 65 BPM | WEIGHT: 104 LBS

## 2025-07-18 DIAGNOSIS — N28.1 RENAL CYST, RIGHT: Primary | ICD-10-CM

## 2025-07-18 LAB
ALBUMIN SERPL-MCNC: 3.9 G/DL (ref 3.5–5.2)
ALBUMIN/GLOB SERPL: 1.3 G/DL
ALP SERPL-CCNC: 58 U/L (ref 39–117)
ALT SERPL W P-5'-P-CCNC: 10 U/L (ref 1–33)
ANION GAP SERPL CALCULATED.3IONS-SCNC: 10 MMOL/L (ref 5–15)
AST SERPL-CCNC: 17 U/L (ref 1–32)
BACTERIA UR QL AUTO: NORMAL /HPF
BASOPHILS # BLD AUTO: 0.03 10*3/MM3 (ref 0–0.2)
BASOPHILS NFR BLD AUTO: 0.5 % (ref 0–1.5)
BILIRUB SERPL-MCNC: 0.4 MG/DL (ref 0–1.2)
BILIRUB UR QL STRIP: NEGATIVE
BUN SERPL-MCNC: 10 MG/DL (ref 8–23)
BUN/CREAT SERPL: 11.5 (ref 7–25)
CALCIUM SPEC-SCNC: 9.2 MG/DL (ref 8.6–10.5)
CHLORIDE SERPL-SCNC: 107 MMOL/L (ref 98–107)
CLARITY UR: CLEAR
CO2 SERPL-SCNC: 25 MMOL/L (ref 22–29)
COLOR UR: YELLOW
CREAT SERPL-MCNC: 0.87 MG/DL (ref 0.57–1)
DEPRECATED RDW RBC AUTO: 37.7 FL (ref 37–54)
EGFRCR SERPLBLD CKD-EPI 2021: 74.5 ML/MIN/1.73
EOSINOPHIL # BLD AUTO: 0.2 10*3/MM3 (ref 0–0.4)
EOSINOPHIL NFR BLD AUTO: 3.6 % (ref 0.3–6.2)
ERYTHROCYTE [DISTWIDTH] IN BLOOD BY AUTOMATED COUNT: 12.4 % (ref 12.3–15.4)
GLOBULIN UR ELPH-MCNC: 3.1 GM/DL
GLUCOSE SERPL-MCNC: 83 MG/DL (ref 65–99)
GLUCOSE UR STRIP-MCNC: NEGATIVE MG/DL
HCT VFR BLD AUTO: 36.7 % (ref 34–46.6)
HGB BLD-MCNC: 12.2 G/DL (ref 12–15.9)
HGB UR QL STRIP.AUTO: NEGATIVE
HYALINE CASTS UR QL AUTO: NORMAL /LPF
IMM GRANULOCYTES # BLD AUTO: 0 10*3/MM3 (ref 0–0.05)
IMM GRANULOCYTES NFR BLD AUTO: 0 % (ref 0–0.5)
KETONES UR QL STRIP: NEGATIVE
LEUKOCYTE ESTERASE UR QL STRIP.AUTO: ABNORMAL
LIPASE SERPL-CCNC: 22 U/L (ref 13–60)
LYMPHOCYTES # BLD AUTO: 2.92 10*3/MM3 (ref 0.7–3.1)
LYMPHOCYTES NFR BLD AUTO: 52.1 % (ref 19.6–45.3)
MAGNESIUM SERPL-MCNC: 2 MG/DL (ref 1.6–2.4)
MCH RBC QN AUTO: 28.2 PG (ref 26.6–33)
MCHC RBC AUTO-ENTMCNC: 33.2 G/DL (ref 31.5–35.7)
MCV RBC AUTO: 85 FL (ref 79–97)
MONOCYTES # BLD AUTO: 0.39 10*3/MM3 (ref 0.1–0.9)
MONOCYTES NFR BLD AUTO: 7 % (ref 5–12)
NEUTROPHILS NFR BLD AUTO: 2.06 10*3/MM3 (ref 1.7–7)
NEUTROPHILS NFR BLD AUTO: 36.8 % (ref 42.7–76)
NITRITE UR QL STRIP: NEGATIVE
NRBC BLD AUTO-RTO: 0 /100 WBC (ref 0–0.2)
PH UR STRIP.AUTO: 6 [PH] (ref 5–8)
PLATELET # BLD AUTO: 296 10*3/MM3 (ref 140–450)
PMV BLD AUTO: 8.7 FL (ref 6–12)
POTASSIUM SERPL-SCNC: 3.5 MMOL/L (ref 3.5–5.2)
PROT SERPL-MCNC: 7 G/DL (ref 6–8.5)
PROT UR QL STRIP: NEGATIVE
RBC # BLD AUTO: 4.32 10*6/MM3 (ref 3.77–5.28)
RBC # UR STRIP: NORMAL /HPF
REF LAB TEST METHOD: NORMAL
SODIUM SERPL-SCNC: 142 MMOL/L (ref 136–145)
SP GR UR STRIP: 1.01 (ref 1–1.03)
SQUAMOUS #/AREA URNS HPF: NORMAL /HPF
UROBILINOGEN UR QL STRIP: ABNORMAL
WBC # UR STRIP: NORMAL /HPF
WBC NRBC COR # BLD AUTO: 5.6 10*3/MM3 (ref 3.4–10.8)

## 2025-07-18 PROCEDURE — 80053 COMPREHEN METABOLIC PANEL: CPT | Performed by: EMERGENCY MEDICINE

## 2025-07-18 PROCEDURE — 81001 URINALYSIS AUTO W/SCOPE: CPT | Performed by: EMERGENCY MEDICINE

## 2025-07-18 PROCEDURE — 85025 COMPLETE CBC W/AUTO DIFF WBC: CPT | Performed by: EMERGENCY MEDICINE

## 2025-07-18 PROCEDURE — 74177 CT ABD & PELVIS W/CONTRAST: CPT

## 2025-07-18 PROCEDURE — 83690 ASSAY OF LIPASE: CPT | Performed by: EMERGENCY MEDICINE

## 2025-07-18 PROCEDURE — 83735 ASSAY OF MAGNESIUM: CPT | Performed by: EMERGENCY MEDICINE

## 2025-07-18 PROCEDURE — 99285 EMERGENCY DEPT VISIT HI MDM: CPT

## 2025-07-18 PROCEDURE — 25810000003 SODIUM CHLORIDE 0.9 % SOLUTION: Performed by: EMERGENCY MEDICINE

## 2025-07-18 PROCEDURE — 25510000001 IOPAMIDOL 61 % SOLUTION: Performed by: EMERGENCY MEDICINE

## 2025-07-18 RX ORDER — IOPAMIDOL 612 MG/ML
85 INJECTION, SOLUTION INTRAVASCULAR
Status: COMPLETED | OUTPATIENT
Start: 2025-07-18 | End: 2025-07-18

## 2025-07-18 RX ADMIN — SODIUM CHLORIDE 1000 ML: 9 INJECTION, SOLUTION INTRAVENOUS at 17:00

## 2025-07-18 RX ADMIN — IOPAMIDOL 85 ML: 612 INJECTION, SOLUTION INTRAVENOUS at 19:21

## 2025-07-18 NOTE — ED PROVIDER NOTES
" EMERGENCY DEPARTMENT ENCOUNTER    Room Number:  27/27  PCP: Jovany Wiley MD  Historian: Patient      HPI:  Chief Complaint: Abnormal lab  A complete HPI/ROS/PMH/PSH/SH/FH are unobtainable due to: None    Context: Yolanda Lee Hatchett is a 65 y.o. female who presents to the ED via private vehicle c/o acute \"knot\" in her right flank over the last 3 weeks that is somewhat discomforting.  Has not cause any fevers, chills, nausea, vomit, diarrhea, dysuria, hematuria.  Eating and drinking normally, still has good appetite.  Was told that she had elevated amylase level and was told to come to the ER for CT scan.      MEDICAL RECORD REVIEW    External (non-ED) record review: Chart review in epic shows an elevated amylase level of 130 on labs obtained on July 16, 2025              PAST MEDICAL HISTORY  Active Ambulatory Problems     Diagnosis Date Noted    Lung cancer, upper lobe 05/09/2016    COPD (chronic obstructive pulmonary disease) 06/26/2019    Pulmonary nodule, left 07/11/2019    Long-term current use of drug therapy for attention deficit hyperactivity disorder (ADHD) 05/26/2017    Abnormal ECG 03/26/2021    Adenocarcinoma of lung 03/26/2021    Airway hyperreactivity 03/26/2021    Arthritis 03/26/2021    Chest pain 03/26/2021    Cough 03/26/2021    Degeneration of lumbosacral intervertebral disc 03/26/2021    Herniation of intervertebral disc 02/01/2017    Low back pain 03/26/2021    Neck pain 03/26/2021    Tobacco use disorder, moderate, dependence 11/09/2022    Abdominal distension 12/28/2022    Positive colorectal cancer screening using Cologuard test 12/28/2022    Spinal stenosis of lumbar region with neurogenic claudication 04/24/2023    Lumbar radiculopathy 04/24/2023    Lumbar facet arthropathy 09/08/2023     Resolved Ambulatory Problems     Diagnosis Date Noted    No Resolved Ambulatory Problems     Past Medical History:   Diagnosis Date    Asthma     Bleeding of blood vessel 01/05/2011    Carpal " tunnel syndrome on right     DDD (degenerative disc disease), cervical 09/22/2014    Drug therapy     Emphysema of lung     Fibroids     Hx of radiation therapy     Lung cancer     MI (myocardial infarction) 06/2009    Mitral insufficiency     Non-small cell carcinoma of lung     Ovarian cyst     Pain of right upper extremity 11/18/2015    Pelvic pain     Right shoulder pain     SOB (shortness of breath)     Thyroid nodule     Vertigo 05/13/2015    Weight loss          PAST SURGICAL HISTORY  Past Surgical History:   Procedure Laterality Date    APPENDECTOMY      CATARACT EXTRACTION, BILATERAL  03/2019    COLONOSCOPY N/A 1/10/2023    Procedure: COLONOSCOPY TO CECUM AND TERMINAL ILEUM WITH COLD SNARE POLYPECTOMIES;  Surgeon: Maribell Sam MD;  Location: [facility] ENDOSCOPY;  Service: Gastroenterology;  Laterality: N/A;  PRE- POSITIVE COLOGUARD  POST- COLON POLYPS, HEMORRHOIDS    EPIDURAL Left 7/7/2023    Procedure: LEFT L5 & S1 TRANSFORAMINAL LUMBAR EPIDURAL STEROID INJECTION  CPT: 90292,34456;  Surgeon: Jenny Bauman MD;  Location: [facility];  Service: Pain Management;  Laterality: Left;    LAPAROSCOPIC TUBAL LIGATION      LUMBAR EPIDURAL INJECTION N/A 5/15/2023    Procedure: LUMBAR EPIDURAL 1ST VISIT L5-S1 51552;  Surgeon: Jenny Bauman MD;  Location: [facility];  Service: Pain Management;  Laterality: N/A;    LUNG LOBECTOMY Right 2012    upper lobectomy and lymphadenectomy    MEDIAL BRANCH BLOCK Bilateral 9/29/2023    Procedure: BILATERAL L4-S1 LUMBAR MEDIAL BRANCH BLOCK CPT: 96740, 46974;  Surgeon: Jenny Bauman MD;  Location: [facility];  Service: Pain Management;  Laterality: Bilateral;    MEDIAL BRANCH BLOCK Bilateral 3/6/2024    Procedure: BILATERAL L4-S1 LUMBAR MEDIAL BRANCH BLOCK CPT: 54843, 73718;  Surgeon: Jenny Bauman MD;  Location: [facility];  Service: Pain Management;  Laterality: Bilateral;    RADIOFREQUENCY ABLATION Bilateral 4/15/2024    Procedure: BILATERAL L4-S1  RADIOFREQUENCY ABLATION LUMBAR CPT: 01378, 54766;  Surgeon: Jenny Bauman MD;  Location: [facility];  Service: Pain Management;  Laterality: Bilateral;         FAMILY HISTORY  Family History   Problem Relation Age of Onset    Cancer Mother 47        Brain/lungs    Brain cancer Father     Breast cancer Neg Hx     Malig Hyperthermia Neg Hx          SOCIAL HISTORY  Social History     Socioeconomic History    Marital status:     Years of education: College   Tobacco Use    Smoking status: Every Day     Current packs/day: 0.50     Average packs/day: 0.5 packs/day for 53.5 years (26.8 ttl pk-yrs)     Types: Cigarettes     Start date: 1972     Passive exposure: Current    Smokeless tobacco: Never    Tobacco comments:     11/28/22 - reports 3 cigs/day + 3 lozenges/day   Vaping Use    Vaping status: Never Used   Substance and Sexual Activity    Alcohol use: Yes     Comment: OCCASIONAL    Drug use: Yes     Types: Marijuana    Sexual activity: Not Currently     Partners: Male     Birth control/protection: Post-menopausal         ALLERGIES  Codeine        REVIEW OF SYSTEMS  Review of Systems     All systems reviewed and negative except for those discussed in HPI.       PHYSICAL EXAM    I have reviewed the triage vital signs and nursing notes.    ED Triage Vitals   Temp Heart Rate Resp BP SpO2   07/18/25 1558 07/18/25 1558 07/18/25 1558 07/18/25 1601 07/18/25 1558   98.2 °F (36.8 °C) 104 16 157/91 98 %      Temp src Heart Rate Source Patient Position BP Location FiO2 (%)   07/18/25 1558 -- 07/18/25 1601 07/18/25 1601 --   Oral  Sitting Right arm        Physical Exam  General: No acute distress, nontoxic  HEENT: Mucous membranes moist, atraumatic, EOMI  Neck: Full ROM  Pulm: Symmetric chest rise, nonlabored, lungs CTAB  Cardiovascular: Regular rate and rhythm, intact distal pulses  GI: Soft, nontender, palpable liver edge on the right superior lateral abdomen without other palpable soft tissue mass, nondistended, no  rebound, no guarding, bowel sounds present  MSK: Full ROM, no deformity  Skin: Warm, dry  Neuro: Awake, alert, oriented x 4, GCS 15, moving all extremities, no focal deficits  Psych: Calm, cooperative        LAB RESULTS  Recent Results (from the past 24 hours)   Urinalysis With Microscopic If Indicated (No Culture) - Urine, Clean Catch    Collection Time: 07/18/25  4:55 PM    Specimen: Urine, Clean Catch   Result Value Ref Range    Color, UA Yellow Yellow, Straw    Appearance, UA Clear Clear    pH, UA 6.0 5.0 - 8.0    Specific Gravity, UA 1.009 1.005 - 1.030    Glucose, UA Negative Negative    Ketones, UA Negative Negative    Bilirubin, UA Negative Negative    Blood, UA Negative Negative    Protein, UA Negative Negative    Leuk Esterase, UA Trace (A) Negative    Nitrite, UA Negative Negative    Urobilinogen, UA 1.0 E.U./dL 0.2 - 1.0 E.U./dL   Urinalysis, Microscopic Only - Urine, Clean Catch    Collection Time: 07/18/25  4:55 PM    Specimen: Urine, Clean Catch   Result Value Ref Range    RBC, UA 0-2 None Seen, 0-2 /HPF    WBC, UA 0-2 None Seen, 0-2 /HPF    Bacteria, UA None Seen None Seen /HPF    Squamous Epithelial Cells, UA 0-2 None Seen, 0-2 /HPF    Hyaline Casts, UA None Seen None Seen /LPF    Methodology Automated Microscopy    Comprehensive Metabolic Panel    Collection Time: 07/18/25  5:00 PM    Specimen: Blood   Result Value Ref Range    Glucose 83 65 - 99 mg/dL    BUN 10.0 8.0 - 23.0 mg/dL    Creatinine 0.87 0.57 - 1.00 mg/dL    Sodium 142 136 - 145 mmol/L    Potassium 3.5 3.5 - 5.2 mmol/L    Chloride 107 98 - 107 mmol/L    CO2 25.0 22.0 - 29.0 mmol/L    Calcium 9.2 8.6 - 10.5 mg/dL    Total Protein 7.0 6.0 - 8.5 g/dL    Albumin 3.9 3.5 - 5.2 g/dL    ALT (SGPT) 10 1 - 33 U/L    AST (SGOT) 17 1 - 32 U/L    Alkaline Phosphatase 58 39 - 117 U/L    Total Bilirubin 0.4 0.0 - 1.2 mg/dL    Globulin 3.1 gm/dL    A/G Ratio 1.3 g/dL    BUN/Creatinine Ratio 11.5 7.0 - 25.0    Anion Gap 10.0 5.0 - 15.0 mmol/L    eGFR  74.5 >60.0 mL/min/1.73   Lipase    Collection Time: 07/18/25  5:00 PM    Specimen: Blood   Result Value Ref Range    Lipase 22 13 - 60 U/L   Magnesium    Collection Time: 07/18/25  5:00 PM    Specimen: Blood   Result Value Ref Range    Magnesium 2.0 1.6 - 2.4 mg/dL   CBC Auto Differential    Collection Time: 07/18/25  5:00 PM    Specimen: Blood   Result Value Ref Range    WBC 5.60 3.40 - 10.80 10*3/mm3    RBC 4.32 3.77 - 5.28 10*6/mm3    Hemoglobin 12.2 12.0 - 15.9 g/dL    Hematocrit 36.7 34.0 - 46.6 %    MCV 85.0 79.0 - 97.0 fL    MCH 28.2 26.6 - 33.0 pg    MCHC 33.2 31.5 - 35.7 g/dL    RDW 12.4 12.3 - 15.4 %    RDW-SD 37.7 37.0 - 54.0 fl    MPV 8.7 6.0 - 12.0 fL    Platelets 296 140 - 450 10*3/mm3    Neutrophil % 36.8 (L) 42.7 - 76.0 %    Lymphocyte % 52.1 (H) 19.6 - 45.3 %    Monocyte % 7.0 5.0 - 12.0 %    Eosinophil % 3.6 0.3 - 6.2 %    Basophil % 0.5 0.0 - 1.5 %    Immature Grans % 0.0 0.0 - 0.5 %    Neutrophils, Absolute 2.06 1.70 - 7.00 10*3/mm3    Lymphocytes, Absolute 2.92 0.70 - 3.10 10*3/mm3    Monocytes, Absolute 0.39 0.10 - 0.90 10*3/mm3    Eosinophils, Absolute 0.20 0.00 - 0.40 10*3/mm3    Basophils, Absolute 0.03 0.00 - 0.20 10*3/mm3    Immature Grans, Absolute 0.00 0.00 - 0.05 10*3/mm3    nRBC 0.0 0.0 - 0.2 /100 WBC       Ordered the above labs and independently interpreted results. My findings will be discussed in the medical decision making section below        RADIOLOGY  CT Abdomen Pelvis With Contrast  Result Date: 7/18/2025  CT ABDOMEN AND PELVIS WITH IV CONTRAST  HISTORY: Right-sided abdominal fullness, elevated amylase  TECHNIQUE: Radiation dose reduction techniques were utilized, including automated exposure control and exposure modulation based on body size. 3 mm images were obtained through the abdomen and pelvis with IV contrast.  COMPARISON: CT abdomen 11/1/2022      FINDINGS AND IMPRESSION: No free intraperitoneal air is seen. THE DISTAL STOMACH THICKENED APPEARANCE WITH MUCOSAL  HYPERENHANCEMENT NOT WELL EVALUATED WITH CT; HOWEVER, GASTRITIS COULD APPEAR SIMILAR IN THE APPROPRIATE CONTEXT. CORRELATION WITH PATIENT HISTORY IS RECOMMENDED WITH FOLLOW-UP ENDOSCOPY IF CLINICALLY INDICATED.  There are air-fluid levels within the small bowel which is otherwise nondistended. No abdominal pelvic adenopathy by size criteria. Small amount of free fluid is present in the pelvis, nonspecific. The appendix is surgically absent. Gallbladder is decompressed and cannot be evaluated. No suspicious lytic or blastic osseous lesions. Subcentimeter hepatic lesions are too small to characterize. Pancreas is unremarkable postcontrast CT appearance. Hypodense lesion arising off the inferior aspect of the right kidney demonstrates density less than 15 Hanse lesions, likely benign Bosniak 2019 criteria. Left adrenal nodule measuring 1.7 cm is grossly unchanged since 11/1/2022.  There are hypodense lesions within the spleen which have increased in size since 2022. Lateral lesion measures up to 2.9 cm (previously 1.4 cm). Spleen is otherwise not enlarged. While findings are clearly underlying, given the size increase slow-growing neoplasm cannot be excluded. Further evaluation with nonemergent abdominal MRI is recommended.  No hydronephrosis..  This report was finalized on 7/18/2025 9:51 PM by Dr. Malik Gay M.D on Workstation: [employer]        Ordered the above noted radiological studies.  Independently interpreted by me and my independent review of findings can be found in the ED Course.  See dictation for official radiology interpretation.      PROCEDURES    Procedures        MEDICATIONS GIVEN IN ER    Medications   sodium chloride 0.9 % bolus 1,000 mL (0 mL Intravenous Stopped 7/18/25 1810)   iopamidol (ISOVUE-300) 61 % injection 85 mL (85 mL Intravenous Given 7/18/25 1921)         PROGRESS, DATA ANALYSIS, CONSULTS, AND MEDICAL DECISION MAKING    Please note that this section constitutes my independent  interpretation of clinical data including lab results, radiology, EKG's.  This constitutes my independent professional opinion regarding differential diagnosis and management of this patient.  It may include any factors such as history from outside sources, review of external records, social determinants of health, management of medications, response to those treatments, and discussions with other providers.    Differential Diagnosis and Plan: Will recheck labs from a pancreas standpoint, will get a CT scan, reevaluate she is hemodynamically well-controlled, benign exam, unclear etiology of the elevated amylase though consideration for underlying cirrhosis, pancreatitis, choledocholithiasis, mass, among others.  Plan for IV fluids and reevaluate with results.    Additional sources:  - Discussed/ obtained information from independent historians:       - (Social Determinants of Health): None     - Shared decision making:  Patient fully updated on and in agreement with the course and plan moving forward    ED Course as of 07/18/25 2223 Fri Jul 18, 2025   1720 WBC: 5.60 [DC]   1720 Hemoglobin: 12.2 [DC]   1720 Platelets: 296 [DC]   1729 Bacteria, UA: None Seen [DC]   1729 WBC, UA: 0-2 [DC]   1729 RBC, UA: 0-2 [DC]   1729 Nitrite, UA: Negative [DC]   1729 Leukocytes, UA(!): Trace [DC]   1729 Blood, UA: Negative [DC]   1729 Ketones, UA: Negative [DC]   1750 Glucose: 83 [DC]   1750 BUN: 10.0 [DC]   1750 Creatinine: 0.87 [DC]   1750 Sodium: 142 [DC]   1750 Potassium: 3.5 [DC]   1750 ALT (SGPT): 10 [DC]   1750 AST (SGOT): 17 [DC]   1750 Alkaline Phosphatase: 58 [DC]   1751 Total Bilirubin: 0.4 [DC]   1751 Lipase: 22 [DC]   1933 CT Abdomen Pelvis With Contrast  Per my independent interpretation of the CT abdomen pelvis, decompressed gallbladder, unclear mass versus cyst in the right perirenal and perihepatic region of unclear origin and etiology, will defer to final radiology report [DC]   2156 CT Abdomen Pelvis With  Contrast  Radiology report reviewed, possible gastritis, hypodense lesion of the inferior aspect of the right kidney, likely benign, no hydronephrosis, chronic spleen lesions with outpatient MRI recommended [DC]   2222 Patient updated on the reassuring workup today, safe for discharge with outpatient follow-up as discussed.  ED return for worsening symptoms as needed.  MRI of the spleen recommendation from radiology has been relayed to the patient.  [DC]      ED Course User Index  [DC] Rylan Beltran MD       Hospitalization Considered?:     Orders Placed During This Visit:  Orders Placed This Encounter   Procedures    CT Abdomen Pelvis With Contrast    Comprehensive Metabolic Panel    Lipase    Urinalysis With Microscopic If Indicated (No Culture) - Urine, Clean Catch    Magnesium    CBC Auto Differential    Urinalysis, Microscopic Only - Urine, Clean Catch    CBC & Differential       Additional orders considered but not placed:      Independent interpretation of labs, radiology studies, and discussions with consultants: See ED Course        AS OF 22:23 EDT VITALS:    BP - 156/85  HR - 65  TEMP - 98.4 °F (36.9 °C) (Oral)  02 SATS - 98%          DIAGNOSIS  Final diagnoses:   Renal cyst, right         DISPOSITION  ED Disposition       ED Disposition   Discharge    Condition   Stable    Comment   --                Please note that portions of this document were completed with a voice recognition program.    Note Disclaimer: At [facility], we believe that sharing information builds trust and better relationships. You are receiving this note because you recently visited [facility]. It is possible you will see health information before a provider has talked with you about it. This kind of information can be easy to misunderstand. To help you fully understand what it means for your health, we urge you to discuss this note with your provider.                       Rylan Beltran MD  07/18/25 2226

## 2025-07-19 NOTE — DISCHARGE INSTRUCTIONS
Your PCP needs to schedule an outpatient abdominal MRI to further evaluate your spleen as discussed.  Continue current medications, stay well-hydrated, close outpatient PCP follow-up otherwise, ED return for worsening symptoms as needed.

## 2025-07-25 ENCOUNTER — OFFICE VISIT (OUTPATIENT)
Dept: FAMILY MEDICINE CLINIC | Facility: CLINIC | Age: 65
End: 2025-07-25
Payer: COMMERCIAL

## 2025-07-25 VITALS
OXYGEN SATURATION: 96 % | BODY MASS INDEX: 19.14 KG/M2 | DIASTOLIC BLOOD PRESSURE: 78 MMHG | SYSTOLIC BLOOD PRESSURE: 138 MMHG | WEIGHT: 104 LBS | HEIGHT: 62 IN | HEART RATE: 59 BPM

## 2025-07-25 DIAGNOSIS — R10.33 ACUTE PERIUMBILICAL PAIN: ICD-10-CM

## 2025-07-25 DIAGNOSIS — M43.6 TORTICOLLIS: Primary | ICD-10-CM

## 2025-07-25 PROCEDURE — 99213 OFFICE O/P EST LOW 20 MIN: CPT | Performed by: INTERNAL MEDICINE

## 2025-07-25 RX ORDER — CYCLOBENZAPRINE HCL 10 MG
10 TABLET ORAL
Qty: 10 TABLET | Refills: 0 | Status: SHIPPED | OUTPATIENT
Start: 2025-07-25 | End: 2025-08-04

## 2025-07-25 NOTE — PROGRESS NOTES
07/25/2025    Assessment & Plan   This pleasant 65-year-old presents at this time for follow-up from the emergency room she was seen initially about 2 weeks ago in the office and was sent to the emergency room with periumbilical pain.  In the emergency room she relates the pain resolved and his CT scan did not show any abnormalities.  She relates that since then she has done well she has not had any more pain or discomfort.  Note is made that she has a significant history of COPD but continues to smoke.  We discussed with her the importance of this discontinuation.    Patient is blood pressure was 138/78 left arm sitting position standard cuff.  Her weight is 104 pounds essentially unchanged unchanged from her last visit.    She complains of pain in the right trapezial area.  In reviewing the chart we see this has been ongoing problem infection been seen by pain management for this in the past but she relates that it continues.  She relates that it is low-grade mild and occurs usually in the morning.  She relates that at times when she turns her head in certain directions stairs she feels a catch and pain.  Evaluation shows no evidence of any spasm at this time.  Abdominal exam also is unremarkable at this point.      Diagnoses and all orders for this visit:    1. Acute periumbilical pain (Primary)    2. Torticollis      BMI is within normal parameters. No other follow-up for BMI required.    Plan:  1.)  Flexeril 10 mg 1 tab p.o. nightly x 5 days  2.)  Scheduled for physical examination.       CC: Acute periumbilical pain (Follow up ER 7/18/25.)  .        HPI  History of Present Illness     Subjective   Yolanda Lee Hatchett is a 65 y.o. female.      The following portions of the patient's history were reviewed and updated as appropriate: allergies, current medications, past family history, past medical history, past social history, past surgical history, and problem list.    Problem List  Patient Active Problem List    Diagnosis    Lung cancer, upper lobe    COPD (chronic obstructive pulmonary disease)    Pulmonary nodule, left    Long-term current use of drug therapy for attention deficit hyperactivity disorder (ADHD)    Abnormal ECG    Adenocarcinoma of lung    Airway hyperreactivity    Arthritis    Chest pain    Cough    Degeneration of lumbosacral intervertebral disc    Herniation of intervertebral disc    Low back pain    Neck pain    Tobacco use disorder, moderate, dependence    Abdominal distension    Positive colorectal cancer screening using Cologuard test    Spinal stenosis of lumbar region with neurogenic claudication    Lumbar radiculopathy    Lumbar facet arthropathy       Past Medical History  Past Medical History:   Diagnosis Date    Asthma     Bleeding of blood vessel 01/05/2011    BRAIN    Carpal tunnel syndrome on right     COPD (chronic obstructive pulmonary disease)     MODERATE    DDD (degenerative disc disease), cervical 09/22/2014    MODERATE C6-C7, SEEING DR. EWING    Drug therapy     lung ca    Emphysema of lung     Fibroids     UTERINE X 2    Hx of radiation therapy     lung ca    Lung cancer     MI (myocardial infarction) 06/2009    Mitral insufficiency     Non-small cell carcinoma of lung     Stabe IIA    Ovarian cyst     RIGHT    Pain of right upper extremity 11/18/2015    Pelvic pain     Positive colorectal cancer screening using Cologuard test     Right shoulder pain     SOB (shortness of breath)     Thyroid nodule     Vertigo 05/13/2015    Weight loss        Surgical History  Past Surgical History:   Procedure Laterality Date    APPENDECTOMY      CATARACT EXTRACTION, BILATERAL  03/2019    COLONOSCOPY N/A 1/10/2023    Procedure: COLONOSCOPY TO CECUM AND TERMINAL ILEUM WITH COLD SNARE POLYPECTOMIES;  Surgeon: Maribell Sam MD;  Location: [facility];  Service: Gastroenterology;  Laterality: N/A;  PRE- POSITIVE COLOGUARD  POST- COLON POLYPS, HEMORRHOIDS    EPIDURAL Left 7/7/2023     Procedure: LEFT L5 & S1 TRANSFORAMINAL LUMBAR EPIDURAL STEROID INJECTION  CPT: 18466,89146;  Surgeon: Jenny Bauman MD;  Location: [facility];  Service: Pain Management;  Laterality: Left;    LAPAROSCOPIC TUBAL LIGATION      LUMBAR EPIDURAL INJECTION N/A 5/15/2023    Procedure: LUMBAR EPIDURAL 1ST VISIT L5-S1 08114;  Surgeon: Jenny Bauman MD;  Location: [facility];  Service: Pain Management;  Laterality: N/A;    LUNG LOBECTOMY Right 2012    upper lobectomy and lymphadenectomy    MEDIAL BRANCH BLOCK Bilateral 9/29/2023    Procedure: BILATERAL L4-S1 LUMBAR MEDIAL BRANCH BLOCK CPT: 85552, 88006;  Surgeon: Jenny Bauman MD;  Location: [facility];  Service: Pain Management;  Laterality: Bilateral;    MEDIAL BRANCH BLOCK Bilateral 3/6/2024    Procedure: BILATERAL L4-S1 LUMBAR MEDIAL BRANCH BLOCK CPT: 60130, 40974;  Surgeon: Jenny Bauman MD;  Location: [facility];  Service: Pain Management;  Laterality: Bilateral;    RADIOFREQUENCY ABLATION Bilateral 4/15/2024    Procedure: BILATERAL L4-S1 RADIOFREQUENCY ABLATION LUMBAR CPT: 37932, 71523;  Surgeon: Jenny Bauman MD;  Location: [facility];  Service: Pain Management;  Laterality: Bilateral;       Family History  Family History   Problem Relation Age of Onset    Cancer Mother 47        Brain/lungs    Brain cancer Father     Breast cancer Neg Hx     Malig Hyperthermia Neg Hx        Social History  Social History    Tobacco Use      Smoking status: Every Day        Packs/day: 0.50        Years: 0.5 packs/day for 53.6 years (26.8 ttl pk-yrs)        Types: Cigarettes        Start date: 1972        Passive exposure: Current      Smokeless tobacco: Never      Tobacco comments: 11/28/22 - reports 3 cigs/day + 3 lozenges/day       Is the Patient a current tobacco user? No    Allergies  Allergies   Allergen Reactions    Codeine Irritability       Current Medications    Current Outpatient Medications:     albuterol sulfate HFA (Ventolin HFA) 108 (90  Base) MCG/ACT inhaler, Inhale 2 puffs Every 4 (Four) Hours As Needed for Wheezing., Disp: 17 g, Rfl: 3     Review of System  Review of Systems   HENT: Negative.     Respiratory: Negative.     Cardiovascular: Negative.    Gastrointestinal: Negative.      I have reviewed and confirmed the accuracy of the ROS as documented by the MA/LPN/RN Jovany Wiley MD    Vitals:    07/25/25 1455   BP: 138/78   Pulse: 59   SpO2: 96%     Body mass index is 19.02 kg/m².    Objective     Physical Exam  Physical Exam  Cardiovascular:      Rate and Rhythm: Normal rate.   Pulmonary:      Effort: Pulmonary effort is normal.      Breath sounds: Normal breath sounds.   Musculoskeletal:      Cervical back: Normal range of motion.   Skin:     General: Skin is warm.   Neurological:      General: No focal deficit present.             Jovany Wiley MD  07/25/2025

## 2025-08-15 ENCOUNTER — OFFICE VISIT (OUTPATIENT)
Dept: FAMILY MEDICINE CLINIC | Facility: CLINIC | Age: 65
End: 2025-08-15
Payer: COMMERCIAL

## 2025-08-15 VITALS
DIASTOLIC BLOOD PRESSURE: 70 MMHG | SYSTOLIC BLOOD PRESSURE: 120 MMHG | BODY MASS INDEX: 18.77 KG/M2 | HEIGHT: 62 IN | WEIGHT: 102 LBS

## 2025-08-15 DIAGNOSIS — D38.1 NEOPLASM OF UNCERTAIN BEHAVIOR OF RIGHT UPPER LOBE OF LUNG: ICD-10-CM

## 2025-08-15 DIAGNOSIS — Z00.00 ENCOUNTER FOR PHYSICAL EXAMINATION: Primary | ICD-10-CM

## 2025-08-15 DIAGNOSIS — F17.200 SMOKER: ICD-10-CM

## 2025-08-15 DIAGNOSIS — R63.4 WEIGHT LOSS: ICD-10-CM

## 2025-08-15 PROCEDURE — 99396 PREV VISIT EST AGE 40-64: CPT | Performed by: INTERNAL MEDICINE

## (undated) DEVICE — NDL EPID TUOHY 18G 31/2IN

## (undated) DEVICE — Device: Brand: PORTEX

## (undated) DEVICE — CANN O2 ETCO2 FITS ALL CONN CO2 SMPL A/ 7IN DISP LF

## (undated) DEVICE — LN SMPL CO2 SHTRM SD STREAM W/M LUER

## (undated) DEVICE — KT ORCA ORCAPOD DISP STRL

## (undated) DEVICE — GLV SURG TRIUMPH PF LTX 7 STRL

## (undated) DEVICE — SENSR O2 OXIMAX FNGR A/ 18IN NONSTR

## (undated) DEVICE — Device

## (undated) DEVICE — TOWEL,OR,DSP,ST,BLUE,STD,4/PK,20PK/CS: Brand: MEDLINE

## (undated) DEVICE — PAD GRND CATHAY W/CABL DISP

## (undated) DEVICE — GLV SURG TRIUMPH PF LTX 7.5 STRL

## (undated) DEVICE — EPIDURAL TRAY: Brand: MEDLINE INDUSTRIES, INC.

## (undated) DEVICE — SNAR POLYP CAPTIVATOR RND STFF 2.4 240CM 10MM 1P/U

## (undated) DEVICE — NDL SPINE 22G 31/2IN BLK

## (undated) DEVICE — ADAPT CLN BIOGUARD AIR/H2O DISP

## (undated) DEVICE — THE SINGLE USE ETRAP – POLYP TRAP IS USED FOR SUCTION RETRIEVAL OF ENDOSCOPICALLY REMOVED POLYPS.: Brand: ETRAP

## (undated) DEVICE — TUBING, SUCTION, 1/4" X 10', STRAIGHT: Brand: MEDLINE